# Patient Record
Sex: FEMALE | Race: WHITE | NOT HISPANIC OR LATINO | Employment: OTHER | URBAN - METROPOLITAN AREA
[De-identification: names, ages, dates, MRNs, and addresses within clinical notes are randomized per-mention and may not be internally consistent; named-entity substitution may affect disease eponyms.]

---

## 2023-03-15 ENCOUNTER — TELEPHONE (OUTPATIENT)
Dept: PRIMARY CARE | Facility: CLINIC | Age: 68
End: 2023-03-15
Payer: MEDICARE

## 2023-03-15 DIAGNOSIS — E11.9 DIABETES MELLITUS TYPE 2 IN NONOBESE (MULTI): Primary | ICD-10-CM

## 2023-03-15 DIAGNOSIS — E11.65 CONTROLLED TYPE 2 DIABETES MELLITUS WITH HYPERGLYCEMIA, WITH LONG-TERM CURRENT USE OF INSULIN (MULTI): Primary | ICD-10-CM

## 2023-03-15 DIAGNOSIS — Z79.4 CONTROLLED TYPE 2 DIABETES MELLITUS WITH HYPERGLYCEMIA, WITH LONG-TERM CURRENT USE OF INSULIN (MULTI): Primary | ICD-10-CM

## 2023-03-15 RX ORDER — DULAGLUTIDE 4.5 MG/.5ML
4.5 INJECTION, SOLUTION SUBCUTANEOUS
COMMUNITY
Start: 2021-10-14 | End: 2023-03-15 | Stop reason: SDUPTHER

## 2023-03-15 RX ORDER — DULAGLUTIDE 4.5 MG/.5ML
4.5 INJECTION, SOLUTION SUBCUTANEOUS
Qty: 4 PEN | Refills: 10 | Status: SHIPPED | OUTPATIENT
Start: 2023-03-15 | End: 2023-09-05 | Stop reason: SDUPTHER

## 2023-03-15 RX ORDER — DAPAGLIFLOZIN 10 MG/1
10 TABLET, FILM COATED ORAL
Qty: 90 TABLET | Refills: 0 | Status: SHIPPED | OUTPATIENT
Start: 2023-03-15 | End: 2023-06-21

## 2023-03-15 RX ORDER — DAPAGLIFLOZIN 10 MG/1
10 TABLET, FILM COATED ORAL
COMMUNITY
End: 2023-03-15 | Stop reason: SDUPTHER

## 2023-03-15 NOTE — TELEPHONE ENCOUNTER
Pt returned Dawns call she found Trulicity 4.5 at [facility] and asked if you could send the rx there.

## 2023-05-02 ENCOUNTER — TELEPHONE (OUTPATIENT)
Dept: PRIMARY CARE | Facility: CLINIC | Age: 68
End: 2023-05-02
Payer: MEDICARE

## 2023-05-02 NOTE — TELEPHONE ENCOUNTER
I called the pt to remind her of her upcoming 5/9/23 Medicare wellness appoint and to request she bring her living will and DPOAH to have scanned into her chart. Reached her vm. MELISAOM.

## 2023-05-09 ENCOUNTER — OFFICE VISIT (OUTPATIENT)
Dept: PRIMARY CARE | Facility: CLINIC | Age: 68
End: 2023-05-09
Payer: MEDICARE

## 2023-05-09 ENCOUNTER — TELEPHONE (OUTPATIENT)
Dept: PRIMARY CARE | Facility: CLINIC | Age: 68
End: 2023-05-09

## 2023-05-09 VITALS
RESPIRATION RATE: 18 BRPM | HEIGHT: 66 IN | BODY MASS INDEX: 27.32 KG/M2 | WEIGHT: 170 LBS | DIASTOLIC BLOOD PRESSURE: 70 MMHG | HEART RATE: 64 BPM | SYSTOLIC BLOOD PRESSURE: 110 MMHG

## 2023-05-09 DIAGNOSIS — Z00.00 ENCOUNTER FOR SUBSEQUENT ANNUAL WELLNESS VISIT (AWV) IN MEDICARE PATIENT: ICD-10-CM

## 2023-05-09 DIAGNOSIS — N95.2 ATROPHIC VAGINITIS: ICD-10-CM

## 2023-05-09 DIAGNOSIS — E78.5 HYPERLIPIDEMIA DUE TO TYPE 2 DIABETES MELLITUS (MULTI): ICD-10-CM

## 2023-05-09 DIAGNOSIS — Z79.2 PROPHYLACTIC ANTIBIOTIC: ICD-10-CM

## 2023-05-09 DIAGNOSIS — Z00.01 ANNUAL VISIT FOR GENERAL ADULT MEDICAL EXAMINATION WITH ABNORMAL FINDINGS: ICD-10-CM

## 2023-05-09 DIAGNOSIS — M25.512 CHRONIC LEFT SHOULDER PAIN: ICD-10-CM

## 2023-05-09 DIAGNOSIS — N95.2 POSTMENOPAUSAL ATROPHIC VAGINITIS: ICD-10-CM

## 2023-05-09 DIAGNOSIS — E11.9 TYPE 2 DIABETES MELLITUS TREATED WITH INSULIN (MULTI): Primary | ICD-10-CM

## 2023-05-09 DIAGNOSIS — N85.2 ENLARGED UTERUS: ICD-10-CM

## 2023-05-09 DIAGNOSIS — Z87.42 HISTORY OF ABNORMAL CERVICAL PAP SMEAR: ICD-10-CM

## 2023-05-09 DIAGNOSIS — G89.29 CHRONIC LEFT SHOULDER PAIN: ICD-10-CM

## 2023-05-09 DIAGNOSIS — E11.69 HYPERLIPIDEMIA DUE TO TYPE 2 DIABETES MELLITUS (MULTI): ICD-10-CM

## 2023-05-09 DIAGNOSIS — Z12.4 PAP SMEAR FOR CERVICAL CANCER SCREENING: ICD-10-CM

## 2023-05-09 DIAGNOSIS — I10 HYPERTENSION, UNSPECIFIED TYPE: ICD-10-CM

## 2023-05-09 DIAGNOSIS — E04.9 GOITER: ICD-10-CM

## 2023-05-09 DIAGNOSIS — Z79.4 TYPE 2 DIABETES MELLITUS TREATED WITH INSULIN (MULTI): Primary | ICD-10-CM

## 2023-05-09 DIAGNOSIS — D25.0 SUBMUCOUS LEIOMYOMA OF UTERUS: ICD-10-CM

## 2023-05-09 DIAGNOSIS — Z96.653 HISTORY OF BILATERAL KNEE REPLACEMENT: ICD-10-CM

## 2023-05-09 DIAGNOSIS — Z78.9 FULL CODE STATUS: ICD-10-CM

## 2023-05-09 DIAGNOSIS — E11.9 ENCOUNTER FOR DIABETIC FOOT EXAM (MULTI): ICD-10-CM

## 2023-05-09 DIAGNOSIS — Z13.89 ENCOUNTER FOR SCREENING FOR OTHER DISORDER: ICD-10-CM

## 2023-05-09 PROBLEM — H02.59 LAXITY OF EYELID: Status: RESOLVED | Noted: 2023-05-09 | Resolved: 2023-05-09

## 2023-05-09 PROBLEM — H02.535 EYELID RETRACTION LEFT LOWER EYELID: Status: RESOLVED | Noted: 2023-05-09 | Resolved: 2023-05-09

## 2023-05-09 PROBLEM — J30.9 ALLERGIC RHINITIS: Status: ACTIVE | Noted: 2023-05-09

## 2023-05-09 PROBLEM — E04.1 SOLITARY THYROID NODULE: Status: ACTIVE | Noted: 2023-05-09

## 2023-05-09 PROBLEM — E55.9 VITAMIN D DEFICIENCY: Status: ACTIVE | Noted: 2023-05-09

## 2023-05-09 PROBLEM — H26.9 CATARACT: Status: ACTIVE | Noted: 2023-05-09

## 2023-05-09 PROBLEM — H05.20 PROPTOSIS: Status: ACTIVE | Noted: 2023-05-09

## 2023-05-09 PROBLEM — R31.29 OTHER MICROSCOPIC HEMATURIA: Status: ACTIVE | Noted: 2023-05-09

## 2023-05-09 PROBLEM — R45.89 SELF-ESTEEM DISTURBANCE: Status: ACTIVE | Noted: 2023-05-09

## 2023-05-09 PROBLEM — D25.9 FIBROID UTERUS: Status: ACTIVE | Noted: 2023-05-09

## 2023-05-09 PROBLEM — T38.3X5A METFORMIN ADVERSE REACTION: Status: RESOLVED | Noted: 2023-05-09 | Resolved: 2023-05-09

## 2023-05-09 PROBLEM — R31.29 OTHER MICROSCOPIC HEMATURIA: Status: RESOLVED | Noted: 2023-05-09 | Resolved: 2023-05-09

## 2023-05-09 PROBLEM — M17.11 ARTHRITIS OF KNEE, RIGHT: Status: RESOLVED | Noted: 2023-05-09 | Resolved: 2023-05-09

## 2023-05-09 PROBLEM — H52.13 AXIAL MYOPIA OF BOTH EYES: Status: ACTIVE | Noted: 2023-05-09

## 2023-05-09 PROBLEM — R30.0 DYSURIA: Status: RESOLVED | Noted: 2023-05-09 | Resolved: 2023-05-09

## 2023-05-09 PROBLEM — N36.2 URETHRAL CARUNCLE: Status: ACTIVE | Noted: 2023-05-09

## 2023-05-09 PROBLEM — R63.4 ABNORMAL LOSS OF WEIGHT: Status: RESOLVED | Noted: 2023-05-09 | Resolved: 2023-05-09

## 2023-05-09 PROBLEM — F41.9 ANXIETY: Status: ACTIVE | Noted: 2023-05-09

## 2023-05-09 PROBLEM — K44.9 HIATAL HERNIA: Status: ACTIVE | Noted: 2023-05-09

## 2023-05-09 PROBLEM — F43.29 STRESS AND ADJUSTMENT REACTION: Status: RESOLVED | Noted: 2023-05-09 | Resolved: 2023-05-09

## 2023-05-09 PROBLEM — I49.49 ECTOPIC BEAT: Status: RESOLVED | Noted: 2023-05-09 | Resolved: 2023-05-09

## 2023-05-09 PROBLEM — I27.20 PULMONARY HYPERTENSION (MULTI): Status: RESOLVED | Noted: 2023-05-09 | Resolved: 2023-05-09

## 2023-05-09 PROBLEM — R31.9 HEMATURIA: Status: RESOLVED | Noted: 2023-05-09 | Resolved: 2023-05-09

## 2023-05-09 PROBLEM — E53.8 VITAMIN B12 DEFICIENCY (NON ANEMIC): Status: ACTIVE | Noted: 2023-05-09

## 2023-05-09 PROBLEM — E87.6 HYPOKALEMIA: Status: ACTIVE | Noted: 2023-05-09

## 2023-05-09 PROBLEM — N36.8 MASS OF URETHRA: Status: RESOLVED | Noted: 2023-05-09 | Resolved: 2023-05-09

## 2023-05-09 PROBLEM — Z96.659 S/P KNEE REPLACEMENT: Status: ACTIVE | Noted: 2017-06-29

## 2023-05-09 PROBLEM — H02.532 EYELID RETRACTION RIGHT LOWER EYELID: Status: RESOLVED | Noted: 2023-05-09 | Resolved: 2023-05-09

## 2023-05-09 PROBLEM — N36.2 URETHRAL CARUNCLE: Status: RESOLVED | Noted: 2023-05-09 | Resolved: 2023-05-09

## 2023-05-09 PROBLEM — F42.8 OBSESSIVE THINKING: Status: ACTIVE | Noted: 2023-05-09

## 2023-05-09 PROBLEM — R01.1 HEART MURMUR: Status: ACTIVE | Noted: 2023-05-09

## 2023-05-09 PROBLEM — R80.9 PROTEINURIA: Status: RESOLVED | Noted: 2023-05-09 | Resolved: 2023-05-09

## 2023-05-09 PROBLEM — I35.8 AORTIC VALVE SCLEROSIS: Status: ACTIVE | Noted: 2023-05-09

## 2023-05-09 LAB — POC HEMOGLOBIN A1C: 7.8 % (ref 4.2–6.5)

## 2023-05-09 PROCEDURE — 1159F MED LIST DOCD IN RCRD: CPT | Performed by: INTERNAL MEDICINE

## 2023-05-09 PROCEDURE — 3074F SYST BP LT 130 MM HG: CPT | Performed by: INTERNAL MEDICINE

## 2023-05-09 PROCEDURE — 3078F DIAST BP <80 MM HG: CPT | Performed by: INTERNAL MEDICINE

## 2023-05-09 PROCEDURE — 99497 ADVNCD CARE PLAN 30 MIN: CPT | Performed by: INTERNAL MEDICINE

## 2023-05-09 PROCEDURE — 1157F ADVNC CARE PLAN IN RCRD: CPT | Performed by: INTERNAL MEDICINE

## 2023-05-09 PROCEDURE — 1036F TOBACCO NON-USER: CPT | Performed by: INTERNAL MEDICINE

## 2023-05-09 PROCEDURE — 1170F FXNL STATUS ASSESSED: CPT | Performed by: INTERNAL MEDICINE

## 2023-05-09 PROCEDURE — 99397 PER PM REEVAL EST PAT 65+ YR: CPT | Performed by: INTERNAL MEDICINE

## 2023-05-09 PROCEDURE — 99213 OFFICE O/P EST LOW 20 MIN: CPT | Performed by: INTERNAL MEDICINE

## 2023-05-09 PROCEDURE — 83036 HEMOGLOBIN GLYCOSYLATED A1C: CPT | Performed by: INTERNAL MEDICINE

## 2023-05-09 PROCEDURE — 4010F ACE/ARB THERAPY RXD/TAKEN: CPT | Performed by: INTERNAL MEDICINE

## 2023-05-09 PROCEDURE — G0439 PPPS, SUBSEQ VISIT: HCPCS | Performed by: INTERNAL MEDICINE

## 2023-05-09 PROCEDURE — G0442 ANNUAL ALCOHOL SCREEN 15 MIN: HCPCS | Performed by: INTERNAL MEDICINE

## 2023-05-09 PROCEDURE — 3051F HG A1C>EQUAL 7.0%<8.0%: CPT | Performed by: INTERNAL MEDICINE

## 2023-05-09 PROCEDURE — 1160F RVW MEDS BY RX/DR IN RCRD: CPT | Performed by: INTERNAL MEDICINE

## 2023-05-09 RX ORDER — ESTRADIOL 0.1 MG/G
CREAM VAGINAL
Qty: 42.5 G | Refills: 2 | Status: SHIPPED | OUTPATIENT
Start: 2023-05-09 | End: 2024-04-13

## 2023-05-09 RX ORDER — RAMIPRIL 10 MG/1
10 CAPSULE ORAL EVERY 12 HOURS
Qty: 180 CAPSULE | Refills: 3 | Status: SHIPPED | OUTPATIENT
Start: 2023-05-09 | End: 2024-05-15 | Stop reason: SDUPTHER

## 2023-05-09 RX ORDER — INSULIN GLARGINE 100 [IU]/ML
18 INJECTION, SOLUTION SUBCUTANEOUS NIGHTLY
Qty: 10 ML | Refills: 2 | Status: SHIPPED | OUTPATIENT
Start: 2023-05-09 | End: 2023-09-05 | Stop reason: SDUPTHER

## 2023-05-09 RX ORDER — AMOXICILLIN 500 MG/1
2000 CAPSULE ORAL AS NEEDED
Qty: 8 CAPSULE | Refills: 1 | Status: SHIPPED | OUTPATIENT
Start: 2023-05-09 | End: 2023-05-09 | Stop reason: SDUPTHER

## 2023-05-09 RX ORDER — AMOXICILLIN 500 MG/1
2000 CAPSULE ORAL AS NEEDED
Qty: 8 CAPSULE | Refills: 1 | Status: SHIPPED | OUTPATIENT
Start: 2023-05-09 | End: 2024-05-15 | Stop reason: SDUPTHER

## 2023-05-09 RX ORDER — DICLOFENAC SODIUM 10 MG/G
GEL TOPICAL
Qty: 100 G | Refills: 3 | Status: SHIPPED | OUTPATIENT
Start: 2023-05-09 | End: 2023-09-05 | Stop reason: ALTCHOICE

## 2023-05-09 RX ORDER — AMOXICILLIN 500 MG/1
500 CAPSULE ORAL AS NEEDED
Qty: 8 CAPSULE | Refills: 1 | Status: SHIPPED | OUTPATIENT
Start: 2023-05-09 | End: 2023-05-09 | Stop reason: DRUGHIGH

## 2023-05-09 SDOH — ECONOMIC STABILITY: HOUSING INSECURITY
IN THE LAST 12 MONTHS, WAS THERE A TIME WHEN YOU DID NOT HAVE A STEADY PLACE TO SLEEP OR SLEPT IN A SHELTER (INCLUDING NOW)?: NO

## 2023-05-09 SDOH — ECONOMIC STABILITY: INCOME INSECURITY: IN THE LAST 12 MONTHS, WAS THERE A TIME WHEN YOU WERE NOT ABLE TO PAY THE MORTGAGE OR RENT ON TIME?: NO

## 2023-05-09 SDOH — ECONOMIC STABILITY: FOOD INSECURITY: WITHIN THE PAST 12 MONTHS, THE FOOD YOU BOUGHT JUST DIDN'T LAST AND YOU DIDN'T HAVE MONEY TO GET MORE.: NEVER TRUE

## 2023-05-09 SDOH — ECONOMIC STABILITY: FOOD INSECURITY: WITHIN THE PAST 12 MONTHS, YOU WORRIED THAT YOUR FOOD WOULD RUN OUT BEFORE YOU GOT MONEY TO BUY MORE.: NEVER TRUE

## 2023-05-09 SDOH — HEALTH STABILITY: PHYSICAL HEALTH: ON AVERAGE, HOW MANY DAYS PER WEEK DO YOU ENGAGE IN MODERATE TO STRENUOUS EXERCISE (LIKE A BRISK WALK)?: 3 DAYS

## 2023-05-09 SDOH — ECONOMIC STABILITY: TRANSPORTATION INSECURITY
IN THE PAST 12 MONTHS, HAS THE LACK OF TRANSPORTATION KEPT YOU FROM MEDICAL APPOINTMENTS OR FROM GETTING MEDICATIONS?: NO

## 2023-05-09 SDOH — ECONOMIC STABILITY: TRANSPORTATION INSECURITY
IN THE PAST 12 MONTHS, HAS LACK OF TRANSPORTATION KEPT YOU FROM MEETINGS, WORK, OR FROM GETTING THINGS NEEDED FOR DAILY LIVING?: NO

## 2023-05-09 SDOH — HEALTH STABILITY: PHYSICAL HEALTH: ON AVERAGE, HOW MANY MINUTES DO YOU ENGAGE IN EXERCISE AT THIS LEVEL?: 30 MIN

## 2023-05-09 SDOH — ECONOMIC STABILITY: HOUSING INSECURITY: IN THE LAST 12 MONTHS, HOW MANY PLACES HAVE YOU LIVED?: 1

## 2023-05-09 ASSESSMENT — LIFESTYLE VARIABLES
HOW OFTEN DO YOU HAVE A DRINK CONTAINING ALCOHOL: MONTHLY OR LESS
HOW OFTEN DO YOU HAVE SIX OR MORE DRINKS ON ONE OCCASION: NEVER
AUDIT-C TOTAL SCORE: 1
SKIP TO QUESTIONS 9-10: 1
HOW MANY STANDARD DRINKS CONTAINING ALCOHOL DO YOU HAVE ON A TYPICAL DAY: 1 OR 2

## 2023-05-09 ASSESSMENT — SOCIAL DETERMINANTS OF HEALTH (SDOH)
DO YOU BELONG TO ANY CLUBS OR ORGANIZATIONS SUCH AS CHURCH GROUPS UNIONS, FRATERNAL OR ATHLETIC GROUPS, OR SCHOOL GROUPS?: NO
HOW OFTEN DO YOU ATTENT MEETINGS OF THE CLUB OR ORGANIZATION YOU BELONG TO?: NEVER
WITHIN THE LAST YEAR, HAVE YOU BEEN AFRAID OF YOUR PARTNER OR EX-PARTNER?: NO
WITHIN THE LAST YEAR, HAVE YOU BEEN HUMILIATED OR EMOTIONALLY ABUSED IN OTHER WAYS BY YOUR PARTNER OR EX-PARTNER?: NO
HOW OFTEN DO YOU GET TOGETHER WITH FRIENDS OR RELATIVES?: TWICE A WEEK
WITHIN THE LAST YEAR, HAVE YOU BEEN KICKED, HIT, SLAPPED, OR OTHERWISE PHYSICALLY HURT BY YOUR PARTNER OR EX-PARTNER?: NO
IN A TYPICAL WEEK, HOW MANY TIMES DO YOU TALK ON THE PHONE WITH FAMILY, FRIENDS, OR NEIGHBORS?: TWICE A WEEK
WITHIN THE LAST YEAR, HAVE TO BEEN RAPED OR FORCED TO HAVE ANY KIND OF SEXUAL ACTIVITY BY YOUR PARTNER OR EX-PARTNER?: NO
HOW OFTEN DO YOU ATTEND CHURCH OR RELIGIOUS SERVICES?: MORE THAN 4 TIMES PER YEAR
HOW HARD IS IT FOR YOU TO PAY FOR THE VERY BASICS LIKE FOOD, HOUSING, MEDICAL CARE, AND HEATING?: NOT HARD AT ALL

## 2023-05-09 ASSESSMENT — PAIN SCALES - GENERAL: PAINLEVEL: 0-NO PAIN

## 2023-05-09 ASSESSMENT — ACTIVITIES OF DAILY LIVING (ADL)
PATIENT'S MEMORY ADEQUATE TO SAFELY COMPLETE DAILY ACTIVITIES?: YES
DRESSING YOURSELF: INDEPENDENT
JUDGMENT_ADEQUATE_SAFELY_COMPLETE_DAILY_ACTIVITIES: YES
HEARING - RIGHT EAR: FUNCTIONAL
TAKING_MEDICATION: INDEPENDENT
DOING_HOUSEWORK: INDEPENDENT
BATHING: INDEPENDENT
MANAGING_FINANCES: INDEPENDENT
GROCERY_SHOPPING: INDEPENDENT
GROOMING: INDEPENDENT
FEEDING YOURSELF: INDEPENDENT
WALKS IN HOME: INDEPENDENT
TOILETING: INDEPENDENT
ADEQUATE_TO_COMPLETE_ADL: YES
HEARING - LEFT EAR: FUNCTIONAL

## 2023-05-09 ASSESSMENT — COLUMBIA-SUICIDE SEVERITY RATING SCALE - C-SSRS
6. HAVE YOU EVER DONE ANYTHING, STARTED TO DO ANYTHING, OR PREPARED TO DO ANYTHING TO END YOUR LIFE?: NO
1. IN THE PAST MONTH, HAVE YOU WISHED YOU WERE DEAD OR WISHED YOU COULD GO TO SLEEP AND NOT WAKE UP?: NO
2. HAVE YOU ACTUALLY HAD ANY THOUGHTS OF KILLING YOURSELF?: NO

## 2023-05-09 ASSESSMENT — ANXIETY QUESTIONNAIRES
6. BECOMING EASILY ANNOYED OR IRRITABLE: NOT AT ALL
7. FEELING AFRAID AS IF SOMETHING AWFUL MIGHT HAPPEN: NOT AT ALL
2. NOT BEING ABLE TO STOP OR CONTROL WORRYING: NOT AT ALL
5. BEING SO RESTLESS THAT IT IS HARD TO SIT STILL: NOT AT ALL
3. WORRYING TOO MUCH ABOUT DIFFERENT THINGS: NOT AT ALL
GAD7 TOTAL SCORE: 0
1. FEELING NERVOUS, ANXIOUS, OR ON EDGE: NOT AT ALL
4. TROUBLE RELAXING: NOT AT ALL
IF YOU CHECKED OFF ANY PROBLEMS ON THIS QUESTIONNAIRE, HOW DIFFICULT HAVE THESE PROBLEMS MADE IT FOR YOU TO DO YOUR WORK, TAKE CARE OF THINGS AT HOME, OR GET ALONG WITH OTHER PEOPLE: NOT DIFFICULT AT ALL

## 2023-05-09 ASSESSMENT — PATIENT HEALTH QUESTIONNAIRE - PHQ9
2. FEELING DOWN, DEPRESSED OR HOPELESS: NOT AT ALL
1. LITTLE INTEREST OR PLEASURE IN DOING THINGS: NOT AT ALL
SUM OF ALL RESPONSES TO PHQ9 QUESTIONS 1 AND 2: 0

## 2023-05-09 ASSESSMENT — ENCOUNTER SYMPTOMS
OCCASIONAL FEELINGS OF UNSTEADINESS: 0
DEPRESSION: 0
LOSS OF SENSATION IN FEET: 0

## 2023-05-09 NOTE — PATIENT INSTRUCTIONS
Thank you for coming in for your annual and wellness exam.  Increase lantus to 18 units at night. Keep other medications the same.  Watch your diet to get that a1c and sugars down, they are improving over this past week.    Follow up 3-4 months on hypertension and diabetes, a1c at that visit.    Colonoscopy may be due in 8350-0344, as you had 2 tiny polyps on the 2020 scope, but we can discuss then.  A Pap smear was obtained today, will let you know results. Call if you have not heard in 3 weeks.    Staying hydrated is important for many bodily functions. Generally consuming 48-64 ounces of water per day is recommended.  Exercise 30 minutes daily: goal of at least 150 minutes per week is important to your health.

## 2023-05-09 NOTE — PROGRESS NOTES
Subjective   Patient ID: Alexia Cho is a 68 y.o. female who presents for Medicare Annual Wellness Visit Subsequent  and annual pe.(Pt here for subsequent annual Medicare Wellness Visit. Pt denies any new problems or concerns at this time. ).  LAST VISIT PLAN 1/31/23  Patient Discussion/Summary    MEDICATIONS:  same  INSTRUCTIONS:  You are encouraged to drink 64 oz of water per day. 1 or 2 cups of herbal tea could be counted toward the water intake.    A diet that is low in sugars, refined grains and processed foods is recommended.  Exercise at least 30 minutes per day is also recommended.at least walk around for 15 minutes in the evening before getting on couch under blanket.    FOLLOW UP WITH DR. MULTANI:  Next visit:wellness visit april or may  Provider Impressions    DM2 with inc glu an dhld. no change in med. discussed diet exercise, follow up-watch weight which has creeped up.    Pulmonary HTN noted on a test, asymp    Depression with anxiety followed by psychiatry and doing ok on meds, has follow up  goiter stable exam  Vitamin B12, D deficiencies and hx hypokalemia, no meds changes follow labs, asymp  lab results reviewed.  END INFO FROM PRIOR VISIT  HPI  Health maintenance items last completed:  Colonoscopy: 2/2020- polyps 2 sessile hyperplastic in the rectum. She thought told 10 years? Seems it should be 5-7 years.  Mammogram: 11/17/22- cat 1 neg  Bone Density: 4/2021- neg  Urine albumin if HTN or DM2: normal 2023 and last year ua normla other than approp glucose  Pap: 1/2019- neg  Pelvic: saw urogyn in sept<2020 for caruncle and atrophic and topical estrogen recommended -was to followup in 2 months but di dnot, no c/o. No bleeding. She is using vag estrogen twice per week with applicator.  Breast exam in office: today.  Vaccines due or not completed: Shringrix  Last Health Maintenance exam: 4/7/22     Scribe Transcription:  She has a hx of bilateral TKR. She still has a cataract.    She reports using  her vaginal estrogen twice weekly.     Diabetes:Type II:Is taking medications regularly, denies side effects.  Denies hypoglycemia, polyuria, polydipsia.  No new numbness or paresthesias of extremities.No syncope or dizziness.No blurred vision.  Lab Results   Component Value Date    HGBA1C 7.8 (A) 05/09/2023    HGBA1C 7.3 (A) 01/20/2023    HGBA1C 6.9 (A) 10/04/2022     Lab Results   Component Value Date    CREATININE 0.65 01/20/2023         Lab Results   Component Value Date    GLUCOSE 147 (H) 01/20/2023    CALCIUM 9.4 01/20/2023     01/20/2023    K 4.4 01/20/2023    CO2 31 01/20/2023     01/20/2023    BUN 18 01/20/2023    CREATININE 0.65 01/20/2023      No results found for this or any previous visit (from the past 4464 hour(s)).      Freestyle Darren data:  Current glucose: 112  Avg glucose: 7 days 144, 30 days 158, 90 days 160.  Time in target: 7 days 56%, 30 days 33%, 90 days 32%  Below target: none  Above target: 7 days 44%, 30 days 67%, 90 days 68%  Set range:   Meds: Farxiga, trulicity, lantus  GLP-1 agonists (Trulicity, Victoza, Ozempic, Wegovy, Rybelsus, Bydureon): No nausea, vomiting, abdominal pain and anterior neck pain/swelling.    SGLT-2 inhibitors (Farxiga, Jardiance, Invokana): No hematuria, dysuria, or yeast infections.       Review of Systems  All systems reviewed and are negative unless otherwise stated in HPI.   Review of systems, written document, scanned in to office visit.  I reviewed 7 page history and review of systems form.  Negative review of systen\ms   using cpapanxiety is good.  Objective   Lab Results   Component Value Date    WBC 9.1 01/20/2023    HGB 15.1 01/20/2023    HCT 46.0 01/20/2023     01/20/2023    CHOL 150 01/20/2023    TRIG 150 (H) 01/20/2023    HDL 38.4 (A) 01/20/2023    ALT 26 01/20/2023    AST 16 01/20/2023     01/20/2023    K 4.4 01/20/2023     01/20/2023    CREATININE 0.65 01/20/2023    BUN 18 01/20/2023    CO2 31 01/20/2023    TSH  "1.52 01/20/2023    HGBA1C 7.3 (A) 01/20/2023       Physical ExamBP 110/70 (BP Location: Left arm, Patient Position: Sitting, BP Cuff Size: Adult)   Pulse 64   Resp 18   Ht 1.676 m (5' 6\")   Wt 77.1 kg (170 lb)   BMI 27.44 kg/m²   Patient looks well and is in no obvious distress.  HEENT:   Normocephalic, no facial asymmetry  Eyes: Sclera and conjunctiva are clear.  Neck: No adenopathy cervical (Ant/post/lat), no Supraclavicular nodes.   Thyroid normal.   Carotid pulses normal, no carotid bruits.  Lungs : RR normal. Clear to auscultation anterior, posterior and lateral. No rales, wheezes rhonchi or rubs. Good air exchange.  Heart: RRR. No Murmur, gallop, click or rub.  Abdomen: Bowel sounds normal, No bruits. No pulsatile mass. No hepatosplenomegaly, masses or tenderness. Soft, no guarding.  Vascular:  Posterior tibialis and dorsalis pedis pulses within normal limits bilaterally.   Extremities: no upper extremity edema. No lower extremity edema.   Musculoskeletal: no synovitis of joints seen. No new deformity.  Neuro: CN 2-12 intact. Alert, appropriate.   Ambulates independently.  No gross motor deficit.   No tremors.  Psych: normal mood and affect.  Skin: No rash, bruising petechiae or jaundice.    Breast Exam : Symmetric appearance. No masses, nipple discharge, skin retraction, axillary or supraclavicular adenopathy.  Gyn:  External Genitalia without lesions. No lichen sclerosis Urethra minor mass less than prior and no caruncle. Speculum exam: no lesions or vaginal discharge, cervix without lesions. Pap done Bimanual exam: bulky enlarged uterus unchanged. Cervix does move easily. Canont palpate adnexae-the ovaries were not seenon 2021 ultrasound either. This is a stable exam. No tenderness or mass. Just big uterus, known fibroids.No anal/perineal lesions. Recto vaginal exam normal. (Pt declined chaperone)        Assessment/Plan   Problem List Items Addressed This Visit       Enlarged uterus    Fibroid uterus  " decline ultrasound for surveillance, exam stable and asymptomatic.    Goiter    Hyperlipidemia due to type 2 diabetes mellitus (CMS/[facility])    Hypertension    Postmenopausal atrophic vaginitis   Hx abnl pap remote, pap done today with hpv.  Other Visit Diagnoses       Type 2 diabetes mellitus treated with insulin (CMS/[facility])    -  Primary    Relevant Medications    ramipril (Altace) 10 mg capsule    Lantus U-100 Insulin 100 unit/mL injection  Continue farxiga and trulicity, no c/o with these    Other Relevant Orders    Referral to Ophthalmology over due for eye exam    Chronic left shoulder pain        Relevant Medications    diclofenac sodium (Voltaren) 1 % gel refill    Atrophic vaginitis        Relevant Medications    estradiol (Estrace) 0.01 % (0.1 mg/gram) vaginal cream-this is working well, said ist helps the dryness, no bleeding, or issues. Urethral caruncle is nearly gone.    History of bilateral knee replacement        Relevant Medications    amoxicillin (Amoxil) 500 mg capsule    Prophylactic antibiotic        Relevant Medications    amoxicillin (Amoxil) 500 mg capsule    ramipril (Altace) 10 mg capsule    Encounter for subsequent annual wellness visit (AWV) in Medicare patient        Encounter for screening for other disorder        Full code status            She prefers no pelvic ultrasound and is not having any gyn issues.  Annual  history and physical completed including  ROS, PE.   Medicare wellness visit  completed including:  Immunizations,   Health Maintenance items,  Discussion of advanced directives and code status, which is: full code  Fall risk and prevention addressed.  Functionality and pain were assessed.   Cancer screening testing was addressed as appropriate-see patient discussion/orders.   Medications were discussed and reviewed/reconciled and refill need assessed/handled.   Patient states taking their medication regularly and appropriately.  Also:   Depression screening PhQ-2 completed: neg,  stable follows with psychiatry.  Alcohol misuse screen: neg.    In addition to the wellness visit and screening, the above medical issues were addressed:  As well as results of testing completed since last visit.

## 2023-05-09 NOTE — TELEPHONE ENCOUNTER
Pharmacy calling in regards to amoxicillin script. Has to do with how it is dosed. Can you call them back.

## 2023-05-17 ENCOUNTER — TELEPHONE (OUTPATIENT)
Dept: PRIMARY CARE | Facility: CLINIC | Age: 68
End: 2023-05-17
Payer: MEDICARE

## 2023-05-17 NOTE — TELEPHONE ENCOUNTER
----- Message from Nat Wasserman MD sent at 5/16/2023  8:52 PM EDT -----  Please call the patient regarding her result. Pap test normal and hpv test negative, good news.    Pt notified. LMOM

## 2023-05-17 NOTE — TELEPHONE ENCOUNTER
----- Message from Nat Wasserman MD sent at 5/16/2023  8:52 PM EDT -----  Please call the patient regarding her result. Pap test normal and hpv test negative, good news.

## 2023-06-17 DIAGNOSIS — E11.9 DIABETES MELLITUS TYPE 2 IN NONOBESE (MULTI): ICD-10-CM

## 2023-06-21 RX ORDER — DAPAGLIFLOZIN 10 MG/1
TABLET, FILM COATED ORAL
Qty: 90 TABLET | Refills: 3 | Status: SHIPPED | OUTPATIENT
Start: 2023-06-21 | End: 2024-03-18 | Stop reason: SDUPTHER

## 2023-09-04 ASSESSMENT — ENCOUNTER SYMPTOMS
FATIGUE: 0
SWEATS: 0
POLYPHAGIA: 0
CONFUSION: 0
SPEECH DIFFICULTY: 0
BLURRED VISION: 0
NERVOUS/ANXIOUS: 0
HEADACHES: 0
TREMORS: 0
WEAKNESS: 0
WEIGHT LOSS: 0
DIZZINESS: 0
VISUAL CHANGE: 0
SEIZURES: 0
BLACKOUTS: 0
POLYDIPSIA: 0
HUNGER: 0

## 2023-09-04 NOTE — PROGRESS NOTES
Subjective   Patient ID: Mary Cho is a 68 y.o. female who presents for Follow-up (Pt here for follow up visit. Pt denies any new problems or concerns at this time. A1C done).  LAST VISIT PLAN 5/09/2023  PATIENT'S DISCUSSION/SUMMARY:  Thank you for coming in for your annual and wellness exam.  Increase lantus to 18 units at night. Keep other medications the same.  Watch your diet to get that a1c and sugars down, they are improving over this past week.  Follow up 3-4 months on hypertension and diabetes, a1c at that visit.  Colonoscopy may be due in 3074-6237, as you had 2 tiny polyps on the 2020 scope, but we can discuss then.  A Pap smear was obtained today, will let you know results. Call if you have not heard in 3 weeks.  Staying hydrated is important for many bodily functions. Generally consuming 48-64 ounces of water per day is recommended.  Exercise 30 minutes daily: goal of at least 150 minutes per week is important to your health.   PROVIDER'S IMPRESSIONS:  Type 2 diabetes mellitus treated with insulin (CMS/[facility])  Chronic left shoulder pain  Atrophic vaginitis  History of bilateral knee replacement  Prophylactic antibiotic  Hypertension, unspecified type  Hyperlipidemia due to type 2 diabetes mellitus (CMS/[facility])  Encounter for subsequent annual wellness visit (AWV) in Medicare patient  Encounter for screening for other disorder  Full code status  Enlarged uterus  Submucous leiomyoma of uterus  Postmenopausal atrophic vaginitis  Goiter  History of abnormal cervical Pap smear  Pap smear for cervical cancer screening  Annual visit for general adult medical examination with abnormal findings  Encounter for diabetic foot exam (CMS/[facility])  Orders Placed  POCT glycosylated hemoglobin (Hb A1C) manually resulted  Referral to Ophthalmology Authorized  Medication Changes  amoxicillin 2,000 mg oral as needed   diclofenac sodium 1 % APPLY 2 GRAMS TO LEFT SHOULDER THREE TIMES EACH DAY as needed  insulin  glargine,hum.rec.anlog 18 Units subcutaneous Nightly, Take as directed per insulin instructions.    ramipril 10 mg oral Every 12 hours  END INFO FROM PRIOR VISIT    Hpi:Pt with dm2 htn severe anxiety (managed by psychiatry), hld, here for follow up.  A1c is 8.1% today.  Diabetes  She has type 2 diabetes mellitus. No MedicAlert identification noted. The initial diagnosis of diabetes was made 14 years ago. There are no hypoglycemic associated symptoms. Pertinent negatives for hypoglycemia include no confusion, dizziness, headaches, hunger, mood changes, nervousness/anxiousness, pallor, seizures, sleepiness, speech difficulty, sweats or tremors. There are no diabetic associated symptoms. Pertinent negatives for diabetes include no blurred vision, no chest pain, no fatigue, no foot paresthesias, no foot ulcerations, no polydipsia, no polyphagia, no polyuria, no visual change, no weakness and no weight loss. There are no hypoglycemic complications. Pertinent negatives for hypoglycemia complications include no blackouts, no hospitalization, no nocturnal hypoglycemia, no required assistance and no required glucagon injection. Symptoms are stable. Pertinent negatives for diabetic complications include no CVA, heart disease, impotence, nephropathy, peripheral neuropathy, PVD or retinopathy. Risk factors for coronary artery disease include dyslipidemia, family history, hypertension, obesity and post-menopausal. Current diabetic treatment includes insulin injections and oral agent (dual therapy). She is compliant with treatment most of the time. She is currently taking insulin at bedtime. Insulin injections are given by patient. Rotation sites for injection include the abdominal wall. Her weight is stable. She is following a generally healthy diet. Meal planning includes avoidance of concentrated sweets. She has had a previous visit with a dietitian. She participates in exercise three times a week. She monitors blood glucose  at home 1-2 x per day. She monitors urine at home <1 x per month. Blood glucose monitoring compliance is fair. There is no change in her home blood glucose trend. Her breakfast blood glucose is taken between 5-6 am. Her breakfast blood glucose range is generally 130-140 mg/dl. Her lunch blood glucose is taken between 12-1 pm. Her lunch blood glucose range is generally 140-180 mg/dl. Her dinner blood glucose is taken between 6-7 pm. Her dinner blood glucose range is generally 140-180 mg/dl. Her overall blood glucose range is 140-180 mg/dl. She does not see a podiatrist.Eye exam is not current.   Reminded to see eye doctor.  Cardiac: No CP , palpitations, increased luna  Resp: No sob, wheezing, cough  Extremities: No leg or ankle swelling, no claudication  Neuro: No dizziness, syncope, blurred vision. No new headaches.  Also see patient answers in ros and hpi    Diabetes:Type II:Is taking medications regularly, denies side effects.  Denies hypoglycemia, polyuria, polydipsia.  No new numbness or paresthesias of extremities.No syncope or dizziness.No blurred vision.  Also see patient answers in ros and hpi above.    A1c is 8.1 today. She thinks it is higher due to eating ice cream.  She has an optometry appt. Could not get in with ophthalmologist until January. She has had 3 and the last one retired.  Sees someone in [facility]. Never has had retinopathy. Has appt Friday.  Lab Results   Component Value Date    HGBA1C 7.8 (A) 05/09/2023    HGBA1C 7.3 (A) 01/20/2023    HGBA1C 6.9 (A) 10/04/2022     Lab Results   Component Value Date    CREATININE 0.65 01/20/2023         Lab Results   Component Value Date    GLUCOSE 147 (H) 01/20/2023    CALCIUM 9.4 01/20/2023     01/20/2023    K 4.4 01/20/2023    CO2 31 01/20/2023     01/20/2023    BUN 18 01/20/2023    CREATININE 0.65 01/20/2023           Review of Systems   Constitutional:  Negative for fatigue and weight loss.   Eyes:  Negative for blurred vision.  "  Cardiovascular:  Negative for chest pain.   Endocrine: Negative for polydipsia, polyphagia and polyuria.   Genitourinary:  Negative for impotence.   Skin:  Negative for pallor.   Neurological:  Negative for dizziness, tremors, seizures, speech difficulty, weakness and headaches.   Psychiatric/Behavioral:  Negative for confusion. The patient is not nervous/anxious.    Psychiatry note states she is doing well. She agrees.  GLP-1 agonists (Trulicity,No nausea, vomiting, abdominal pain and anterior neck pain/swelling.   Current Outpatient Medications   Medication Instructions    amLODIPine (Norvasc) 10 mg tablet TAKE 1 TABLET DAILY    amoxicillin (AMOXIL) 2,000 mg, oral, As needed    atorvastatin (Lipitor) 20 mg tablet 1 tablet, oral, Nightly    BD Insulin Syringe Ultra-Fine 0.3 mL 31 gauge x 5/16\" syringe use as directed once daily    cholecalciferol (Vitamin D-3) 50 mcg (2,000 unit) capsule oral    cyanocobalamin (Vitamin B-12) 1,000 mcg tablet 1 tablet, oral, Daily    DULoxetine (Cymbalta) 60 mg DR capsule oral    estradiol (Estrace) 0.01 % (0.1 mg/gram) vaginal cream INSERT 1/4TH GRAM IN VAGINA DAILY OR AS DIRECTED AND CAN RUB SMALL AMOUNT OF CREAM EXTERNALLY    Farxiga 10 mg take 1 tablet by mouth every morning BEFORE A MEAL    FreeStyle Lancets 28 gauge use 1 LANCET to TEST BLOOD SUGAR twice a day    FreeStyle Darren 14 Day Sensor kit APPLY DEVICE TO POSTERIOR ARM AND LEAVE FOR 14 DAYS. USE REMOTE TO SCAN AND REVIEW SUGARS    FreeStyle Lite Strips strip use 1 TEST STRIP to TEST BLOOD SUGAR once daily    hydroCHLOROthiazide (HYDRODiuril) 25 mg tablet TAKE ONE-HALF (1/2) TABLET DAILY (NEW DOSE)    Lactobacillus rhamnosus GG (CULTURELLE) 15 billion cell capsule, sprinkle capsule oral    Lantus U-100 Insulin 18 Units, subcutaneous, Nightly, Take as directed per insulin instructions.    metoprolol succinate XL (Toprol-XL) 200 mg 24 hr tablet 1 tablet, oral, Daily    mirtazapine (Remeron) 30 mg tablet 1 tablet, oral, " "Nightly    psyllium (Metamucil, sugar,) powder oral, Daily RT    ramipril (ALTACE) 10 mg, oral, Every 12 hours    spironolactone (Aldactone) 25 mg tablet 1 tablet, oral, Daily    Trulicity 4.5 mg, subcutaneous, Weekly     Objective   Lab Results   Component Value Date    WBC 9.1 01/20/2023    HGB 15.1 01/20/2023    HCT 46.0 01/20/2023     01/20/2023    CHOL 150 01/20/2023    TRIG 150 (H) 01/20/2023    HDL 38.4 (A) 01/20/2023    ALT 26 01/20/2023    AST 16 01/20/2023     01/20/2023    K 4.4 01/20/2023     01/20/2023    CREATININE 0.65 01/20/2023    BUN 18 01/20/2023    CO2 31 01/20/2023    TSH 1.52 01/20/2023    HGBA1C 7.8 (A) 05/09/2023     Pap may 2023 and hpv were negative.    Physical ExamBP 98/54 (BP Location: Left arm, Patient Position: Sitting, BP Cuff Size: Adult)   Pulse 72   Resp 18   Ht 1.689 m (5' 6.5\")   Wt 77.1 kg (170 lb)   BMI 27.03 kg/m²       6/27/2022     2:52 PM 10/5/2022     8:44 AM 10/5/2022     9:17 AM 1/31/2023     9:43 AM 5/9/2023    11:27 AM 5/9/2023    12:41 PM 9/5/2023     9:08 AM   Vitals   Systolic 122 102 100 106 102 110 98   Diastolic 68 58 60 62 54 70 54   Heart Rate 82 68  64 64  72   Resp 22 18  18 18  18   Height (in) 1.702 m (5' 7\")    1.676 m (5' 6\")  1.689 m (5' 6.5\")   Weight (lb) 157.4 166  170 170  170   BMI 24.65 kg/m2 26 kg/m2  26.63 kg/m2 27.44 kg/m2  27.03 kg/m2   BSA (m2) 1.84 m2 1.89 m2  1.91 m2 1.89 m2  1.9 m2   Visit Report     Report Report Report   She has not checked bp at home, has not been dizzy.    Patient looks her usual self, well appearing and is in no obvious distress.  Bp is running lower trhan needed although she is asymptomatic. Will decrease metoprolol as prefer she not be on as much beta blockade   HEENT:   Normocephalic, no facial asymmetry  Eyes: Sclera and conjunctiva are clear.  Neck: No adenopathy cervical (Ant/post/lat), no Supraclavicular nodes.   Thyroid hisotry of moderate goiter, but normal size on exam today and no " "nodules.  Carotid pulses normal, no carotid bruits.  Lungs : RR normal. Clear to auscultation anterior, posterior and lateral. No rales, wheezes rhonchi or rubs. Good air exchange.  Heart: RRR. No  gallop, click or rub. Cannot hear her usual 1-2/6 systolic murmur today upright or supine.  Abdomen: Bowel sounds normal, No bruits. No pulsatile mass. No hepatosplenomegaly, masses or tenderness. Soft, no guarding.  Vascular:  Posterior tibialis pulses within normal limits bilaterally.   Extremities: no upper extremity edema. No lower extremity edema.   Musculoskeletal: no synovitis of joints seen. No new deformity.  Neuro: CN 2-12 intact. Alert, appropriate.  Ambulates independently.  No gross motor deficit.   No tremors.  Psych: normal mood and affect.  Skin: No rash, bruising petechiae or jaundice.    Alexia was seen today for follow-up.  Diagnoses and all orders for this visit:  Need for pneumococcal vaccination (Primary)  -     Discontinue: pneumoc 20-jan conj-dip cr,PF, (Prevnar) 0.5 mL vaccine; Inject 0.5 mL into the shoulder, thigh, or buttocks 1 time for 1 dose.  -     pneumococcal conjugate (Prevnar 13) 0.5 mL vaccine; Inject 0.5 mL into the shoulder, thigh, or buttocks 1 time for 1 dose.  Type 2 diabetes mellitus treated with insulin (CMS/[facility])  -     Lantus U-100 Insulin 100 unit/mL injection; Inject 22 Units under the skin once daily at bedtime. Take as directed per insulin instructions.  -     BD Insulin Syringe Ultra-Fine 0.3 mL 31 gauge x 5/16\" syringe; use as directed once daily  -     POCT glycosylated hemoglobin (Hb A1C) manually resulted  -     Discontinue: pneumoc 20-jan conj-dip cr,PF, (Prevnar) 0.5 mL vaccine; Inject 0.5 mL into the shoulder, thigh, or buttocks 1 time for 1 dose.  -     pneumococcal conjugate (Prevnar 13) 0.5 mL vaccine; Inject 0.5 mL into the shoulder, thigh, or buttocks 1 time for 1 dose.  Hypertension, unspecified type  -     spironolactone (Aldactone) 25 mg tablet; Take 1 " tablet (25 mg) by mouth once daily.  -     Basic metabolic panel; Future  -     metoprolol succinate XL (Toprol XL) 100 mg 24 hr tablet; Take 1 tablet (100 mg) by mouth once daily. Take with a 50 mg tab for total of 150 mg daily.Do not crush or chew.  -     metoprolol succinate XL (Toprol XL) 50 mg 24 hr tablet; Take 1 tablet (50 mg) by mouth once daily. Take with a 100 mg tab for total of 150 mg daily. Do not crush or chew.  Controlled type 2 diabetes mellitus with hyperglycemia, with long-term current use of insulin (CMS/[facility])  -     Trulicity 4.5 mg/0.5 mL pen injector; Inject 4.5 mg under the skin 1 (one) time per week.  Encounter for monitoring diuretic therapy  -     Magnesium; Future  Anxiety is stable.      Will decrease metoprolol as prefer she be on less for a variety of reasons: hx depression, is on insulin.  BP is lower than it needs to be. She is asymptomatic from this.

## 2023-09-05 ENCOUNTER — OFFICE VISIT (OUTPATIENT)
Dept: PRIMARY CARE | Facility: CLINIC | Age: 68
End: 2023-09-05
Payer: MEDICARE

## 2023-09-05 ENCOUNTER — LAB (OUTPATIENT)
Dept: LAB | Facility: LAB | Age: 68
End: 2023-09-05
Payer: MEDICARE

## 2023-09-05 VITALS
WEIGHT: 170 LBS | HEIGHT: 67 IN | BODY MASS INDEX: 26.68 KG/M2 | RESPIRATION RATE: 18 BRPM | DIASTOLIC BLOOD PRESSURE: 60 MMHG | SYSTOLIC BLOOD PRESSURE: 104 MMHG | HEART RATE: 72 BPM

## 2023-09-05 DIAGNOSIS — I10 HYPERTENSION, UNSPECIFIED TYPE: ICD-10-CM

## 2023-09-05 DIAGNOSIS — Z79.899 ENCOUNTER FOR MONITORING DIURETIC THERAPY: ICD-10-CM

## 2023-09-05 DIAGNOSIS — E11.9 TYPE 2 DIABETES MELLITUS TREATED WITH INSULIN (MULTI): ICD-10-CM

## 2023-09-05 DIAGNOSIS — I35.8 AORTIC VALVE SCLEROSIS: ICD-10-CM

## 2023-09-05 DIAGNOSIS — Z79.4 TYPE 2 DIABETES MELLITUS TREATED WITH INSULIN (MULTI): ICD-10-CM

## 2023-09-05 DIAGNOSIS — Z51.81 ENCOUNTER FOR MONITORING DIURETIC THERAPY: ICD-10-CM

## 2023-09-05 DIAGNOSIS — E11.65 CONTROLLED TYPE 2 DIABETES MELLITUS WITH HYPERGLYCEMIA, WITH LONG-TERM CURRENT USE OF INSULIN (MULTI): ICD-10-CM

## 2023-09-05 DIAGNOSIS — Z79.899 MEDICATION DOSE CHANGED: ICD-10-CM

## 2023-09-05 DIAGNOSIS — Z79.4 CONTROLLED TYPE 2 DIABETES MELLITUS WITH HYPERGLYCEMIA, WITH LONG-TERM CURRENT USE OF INSULIN (MULTI): ICD-10-CM

## 2023-09-05 DIAGNOSIS — E87.6 HYPOKALEMIA: ICD-10-CM

## 2023-09-05 DIAGNOSIS — Z23 NEED FOR PNEUMOCOCCAL VACCINATION: Primary | ICD-10-CM

## 2023-09-05 PROBLEM — M25.512 SHOULDER PAIN, LEFT: Status: ACTIVE | Noted: 2023-09-05

## 2023-09-05 PROBLEM — R03.1 LOW BLOOD PRESSURE READING: Status: ACTIVE | Noted: 2023-09-05

## 2023-09-05 LAB
ANION GAP IN SER/PLAS: 15 MMOL/L (ref 10–20)
CALCIUM (MG/DL) IN SER/PLAS: 10 MG/DL (ref 8.6–10.6)
CARBON DIOXIDE, TOTAL (MMOL/L) IN SER/PLAS: 29 MMOL/L (ref 21–32)
CHLORIDE (MMOL/L) IN SER/PLAS: 100 MMOL/L (ref 98–107)
CREATININE (MG/DL) IN SER/PLAS: 0.78 MG/DL (ref 0.5–1.05)
GFR FEMALE: 82 ML/MIN/1.73M2
GLUCOSE (MG/DL) IN SER/PLAS: 178 MG/DL (ref 74–99)
MAGNESIUM (MG/DL) IN SER/PLAS: 2.28 MG/DL (ref 1.6–2.4)
POC HEMOGLOBIN A1C: 8.1 % (ref 4.2–6.5)
POTASSIUM (MMOL/L) IN SER/PLAS: 4.7 MMOL/L (ref 3.5–5.3)
SODIUM (MMOL/L) IN SER/PLAS: 139 MMOL/L (ref 136–145)
UREA NITROGEN (MG/DL) IN SER/PLAS: 19 MG/DL (ref 6–23)

## 2023-09-05 PROCEDURE — 4010F ACE/ARB THERAPY RXD/TAKEN: CPT | Performed by: INTERNAL MEDICINE

## 2023-09-05 PROCEDURE — 36415 COLL VENOUS BLD VENIPUNCTURE: CPT

## 2023-09-05 PROCEDURE — 3051F HG A1C>EQUAL 7.0%<8.0%: CPT | Performed by: INTERNAL MEDICINE

## 2023-09-05 PROCEDURE — 83036 HEMOGLOBIN GLYCOSYLATED A1C: CPT | Performed by: INTERNAL MEDICINE

## 2023-09-05 PROCEDURE — 1126F AMNT PAIN NOTED NONE PRSNT: CPT | Performed by: INTERNAL MEDICINE

## 2023-09-05 PROCEDURE — 99215 OFFICE O/P EST HI 40 MIN: CPT | Performed by: INTERNAL MEDICINE

## 2023-09-05 PROCEDURE — 3078F DIAST BP <80 MM HG: CPT | Performed by: INTERNAL MEDICINE

## 2023-09-05 PROCEDURE — 80048 BASIC METABOLIC PNL TOTAL CA: CPT

## 2023-09-05 PROCEDURE — 1157F ADVNC CARE PLAN IN RCRD: CPT | Performed by: INTERNAL MEDICINE

## 2023-09-05 PROCEDURE — 83735 ASSAY OF MAGNESIUM: CPT

## 2023-09-05 PROCEDURE — 1160F RVW MEDS BY RX/DR IN RCRD: CPT | Performed by: INTERNAL MEDICINE

## 2023-09-05 PROCEDURE — 3074F SYST BP LT 130 MM HG: CPT | Performed by: INTERNAL MEDICINE

## 2023-09-05 PROCEDURE — 1036F TOBACCO NON-USER: CPT | Performed by: INTERNAL MEDICINE

## 2023-09-05 PROCEDURE — 1159F MED LIST DOCD IN RCRD: CPT | Performed by: INTERNAL MEDICINE

## 2023-09-05 RX ORDER — METOPROLOL SUCCINATE 50 MG/1
50 TABLET, EXTENDED RELEASE ORAL DAILY
Qty: 90 TABLET | Refills: 3 | Status: SHIPPED | OUTPATIENT
Start: 2023-09-05 | End: 2023-12-12 | Stop reason: DRUGHIGH

## 2023-09-05 RX ORDER — METOPROLOL SUCCINATE 200 MG/1
200 TABLET, EXTENDED RELEASE ORAL DAILY
Qty: 90 TABLET | Refills: 2 | Status: CANCELLED | OUTPATIENT
Start: 2023-09-05

## 2023-09-05 RX ORDER — DULAGLUTIDE 4.5 MG/.5ML
4.5 INJECTION, SOLUTION SUBCUTANEOUS
Qty: 4 PEN | Refills: 10 | Status: SHIPPED | OUTPATIENT
Start: 2023-09-05 | End: 2023-12-12 | Stop reason: SDUPTHER

## 2023-09-05 RX ORDER — METOPROLOL SUCCINATE 100 MG/1
100 TABLET, EXTENDED RELEASE ORAL DAILY
Qty: 90 TABLET | Refills: 3 | Status: SHIPPED | OUTPATIENT
Start: 2023-09-05 | End: 2024-03-18 | Stop reason: SDUPTHER

## 2023-09-05 RX ORDER — INSULIN GLARGINE 100 [IU]/ML
22 INJECTION, SOLUTION SUBCUTANEOUS NIGHTLY
Qty: 30 ML | Refills: 3 | Status: SHIPPED | OUTPATIENT
Start: 2023-09-05 | End: 2023-12-12 | Stop reason: SDUPTHER

## 2023-09-05 RX ORDER — PEN NEEDLE, DIABETIC 29 G X1/2"
NEEDLE, DISPOSABLE MISCELLANEOUS
Qty: 100 EACH | Refills: 3 | Status: SHIPPED | OUTPATIENT
Start: 2023-09-05 | End: 2023-12-12 | Stop reason: SDUPTHER

## 2023-09-05 RX ORDER — SPIRONOLACTONE 25 MG/1
25 TABLET ORAL DAILY
Qty: 90 TABLET | Refills: 2 | Status: SHIPPED | OUTPATIENT
Start: 2023-09-05 | End: 2024-03-18 | Stop reason: SDUPTHER

## 2023-09-05 ASSESSMENT — ENCOUNTER SYMPTOMS
CONFUSION: 0
BLACKOUTS: 0
DIZZINESS: 0
WEAKNESS: 0
FATIGUE: 0
SPEECH DIFFICULTY: 0
WEIGHT LOSS: 0
NERVOUS/ANXIOUS: 0
BLURRED VISION: 0
HUNGER: 0
POLYPHAGIA: 0
TREMORS: 0
DIABETIC ASSOCIATED SYMPTOMS: 0
SWEATS: 0
SEIZURES: 0
VISUAL CHANGE: 0
HEADACHES: 0
POLYDIPSIA: 0

## 2023-09-05 ASSESSMENT — PAIN SCALES - GENERAL: PAINLEVEL: 0-NO PAIN

## 2023-09-05 NOTE — PATIENT INSTRUCTIONS
Medication changes:  When the new metoprolol doses arrive in the mail:  stop the 200 mg metoprolol succinate ER and   start taking one of the 100 mg AND one of the 50 mg metoprolol succinate ER tabs.   We are decreasing your total metoprolol succinate dose to 150 mg daily.  We are doing this because your blood pressure is running lower, and prefer you be on less of a beta blocker for other reasons as well.    Increase Lantus to 22 units at night.  Keep other medications as they are.  Blood test soon or today to check on electrolytes with diuretic medications.    Cut back or cut out the ice cream.    Flu shot October.  Pneumonia shot: Prevnar 13 or 15 or 20 anytime between now and when you get your flu shot.    Updated covid vaccine this fall when it comes out.    Follow up in 3 months with A1c at that visit. Call if any problems.

## 2023-09-10 PROBLEM — Z79.899 ENCOUNTER FOR MONITORING DIURETIC THERAPY: Status: ACTIVE | Noted: 2023-05-09

## 2023-09-10 PROBLEM — Z51.81 ENCOUNTER FOR MONITORING DIURETIC THERAPY: Status: ACTIVE | Noted: 2023-05-09

## 2023-09-10 PROBLEM — Z79.899 MEDICATION DOSE CHANGED: Status: ACTIVE | Noted: 2023-09-10

## 2023-09-10 PROBLEM — Z79.4 CONTROLLED TYPE 2 DIABETES MELLITUS WITH HYPERGLYCEMIA, WITH LONG-TERM CURRENT USE OF INSULIN (MULTI): Status: ACTIVE | Noted: 2023-09-10

## 2023-09-10 PROBLEM — E11.65 CONTROLLED TYPE 2 DIABETES MELLITUS WITH HYPERGLYCEMIA, WITH LONG-TERM CURRENT USE OF INSULIN (MULTI): Status: ACTIVE | Noted: 2023-09-10

## 2023-12-11 ASSESSMENT — ENCOUNTER SYMPTOMS
BLACKOUTS: 0
TREMORS: 0
VISUAL CHANGE: 0
SWEATS: 0
CONFUSION: 0
WEAKNESS: 0
POLYPHAGIA: 0
NERVOUS/ANXIOUS: 0
SEIZURES: 0
HUNGER: 0
FATIGUE: 0
SPEECH DIFFICULTY: 0
DIZZINESS: 0
WEIGHT LOSS: 0
HEADACHES: 0
BLURRED VISION: 0
POLYDIPSIA: 0

## 2023-12-11 NOTE — PROGRESS NOTES
"Subjective   Patient ID: Alexia Cho \"Corina" is a 68 y.o. female who presents for Follow-up (3 MO FUV with A1C. Pt denies any new problems. ).  LAST VISIT PLAN 9/5/23  Instructions    Medication changes:  When the new metoprolol doses arrive in the mail:  stop the 200 mg metoprolol succinate ER and   start taking one of the 100 mg AND one of the 50 mg metoprolol succinate ER tabs.   We are decreasing your total metoprolol succinate dose to 150 mg daily.  We are doing this because your blood pressure is running lower, and prefer you be on less of a beta blocker for other reasons as well.     Increase Lantus to 22 units at night.  Keep other medications as they are.  Blood test soon or today to check on electrolytes with diuretic medications.     Cut back or cut out the ice cream.     Flu shot October.  Pneumonia shot: Prevnar 13 or 15 or 20 anytime between now and when you get your flu shot.     Updated covid vaccine this fall when it comes out.     Follow up in 3 months with A1c at that visit. Call if any problems.        END INFO FROM PRIOR VISIT  Diabetes  She has type 2 diabetes mellitus. No MedicAlert identification noted. The initial diagnosis of diabetes was made 10 years ago. Pertinent negatives for hypoglycemia include no confusion, dizziness, headaches, hunger, mood changes, nervousness/anxiousness, pallor, seizures, sleepiness, speech difficulty, sweats or tremors. Pertinent negatives for diabetes include no blurred vision, no chest pain, no fatigue, no foot paresthesias, no foot ulcerations, no polydipsia, no polyphagia, no polyuria, no visual change, no weakness and no weight loss. Pertinent negatives for hypoglycemia complications include no blackouts, no hospitalization, no nocturnal hypoglycemia, no required assistance and no required glucagon injection. Pertinent negatives for diabetic complications include no CVA, heart disease, impotence, nephropathy, peripheral neuropathy, PVD or " retinopathy. Risk factors for coronary artery disease include dyslipidemia, family history, hypertension and obesity. Current diabetic treatment includes oral agent (dual therapy). She is compliant with treatment some of the time. She is currently taking insulin at bedtime. Insulin injections are given by patient. Rotation sites for injection include the abdominal wall. She is following a generally healthy and low salt diet. Meal planning includes avoidance of concentrated sweets and carbohydrate counting. She has not had a previous visit with a dietitian. She participates in exercise three times a week. She monitors blood glucose at home 1-2 x per day. She monitors urine at home <1 x per month. Blood glucose monitoring compliance is adequate. Her home blood glucose trend is increasing steadily. Her breakfast blood glucose is taken between 7-8 am. Her breakfast blood glucose range is generally 140-180 mg/dl. Her lunch blood glucose is taken between 12-1 pm. Her lunch blood glucose range is generally 140-180 mg/dl. Her dinner blood glucose is taken between 5-6 pm. Her dinner blood glucose range is generally 140-180 mg/dl. Her overall blood glucose range is 140-180 mg/dl. She does not see a podiatrist.Eye exam is current.       Scribe Transcription:  Cardiac: No CP , palpitations, increased luna  Resp: No sob, wheezing, cough  Extremities: No leg or ankle swelling, no claudication  Neuro: No dizziness, syncope, blurred vision. No new headaches.     Diabetes:Type II:Is taking medications regularly, denies side effects.  Denies hypoglycemia, polyuria, polydipsia.  No new numbness or paresthesias of extremities.No syncope or dizziness.No blurred vision.  Lab Results   Component Value Date    HGBA1C 8.8 (A) 12/12/2023    HGBA1C 8.1 (A) 09/05/2023    HGBA1C 7.8 (A) 05/09/2023     Lab Results   Component Value Date    CREATININE 0.78 09/05/2023         Lab Results   Component Value Date    GLUCOSE 178 (H) 09/05/2023     "CALCIUM 10.0 09/05/2023     09/05/2023    K 4.7 09/05/2023    CO2 29 09/05/2023     09/05/2023    BUN 19 09/05/2023    CREATININE 0.78 09/05/2023      No results found for this or any previous visit (from the past 4464 hour(s)).      GLP-1 agonists (Trulicity, Victoza, Ozempic, Wegovy, Rybelsus, Bydureon): No nausea, vomiting, abdominal pain and anterior neck pain/swelling.   SGLT-2 inhibitors (Farxiga, Jardiance, Invokana): No hematuria, dysuria, or yeast infections.      She states she has been taking her diabetes medications.       Review of Systems   Constitutional:  Negative for fatigue and weight loss.   Eyes:  Negative for blurred vision.   Cardiovascular:  Negative for chest pain.   Endocrine: Negative for polydipsia, polyphagia and polyuria.   Genitourinary:  Negative for impotence.   Skin:  Negative for pallor.   Neurological:  Negative for dizziness, tremors, seizures, speech difficulty, weakness and headaches.   Psychiatric/Behavioral:  Negative for confusion. The patient is not nervous/anxious.        Current Outpatient Medications   Medication Instructions    amLODIPine (Norvasc) 10 mg tablet TAKE 1 TABLET DAILY    amoxicillin (AMOXIL) 2,000 mg, oral, As needed    atorvastatin (Lipitor) 20 mg tablet 1 tablet, oral, Nightly    BD Insulin Syringe Ultra-Fine 0.3 mL 31 gauge x 5/16\" syringe use as directed once daily    cholecalciferol (Vitamin D-3) 50 mcg (2,000 unit) capsule oral    cyanocobalamin (Vitamin B-12) 1,000 mcg tablet 1 tablet, oral, Daily    DULoxetine (Cymbalta) 60 mg DR capsule oral    estradiol (Estrace) 0.01 % (0.1 mg/gram) vaginal cream INSERT 1/4TH GRAM IN VAGINA DAILY OR AS DIRECTED AND CAN RUB SMALL AMOUNT OF CREAM EXTERNALLY    Farxiga 10 mg take 1 tablet by mouth every morning BEFORE A MEAL    FreeStyle Lancets 28 gauge use 1 LANCET to TEST BLOOD SUGAR twice a day    FreeStyle Darren 14 Day Sensor kit APPLY DEVICE TO POSTERIOR ARM AND LEAVE FOR 14 DAYS. USE REMOTE TO SCAN " "AND REVIEW SUGARS    FreeStyle Lite Strips strip use 1 TEST STRIP to TEST BLOOD SUGAR once daily    hydroCHLOROthiazide (HYDRODiuril) 25 mg tablet TAKE ONE-HALF (1/2) TABLET DAILY (NEW DOSE)    Lactobacillus rhamnosus GG (CULTURELLE) 15 billion cell capsule, sprinkle capsule oral    Lantus U-100 Insulin 22 Units, subcutaneous, Nightly, Take as directed per insulin instructions.    metoprolol succinate XL (TOPROL XL) 100 mg, oral, Daily, Take with a 50 mg tab for total of 150 mg daily.Do not crush or chew.    metoprolol succinate XL (TOPROL XL) 50 mg, oral, Daily, Take with a 100 mg tab for total of 150 mg daily. Do not crush or chew.    mirtazapine (Remeron) 30 mg tablet 1 tablet, oral, Nightly    psyllium (Metamucil, sugar,) powder oral, Daily RT    ramipril (ALTACE) 10 mg, oral, Every 12 hours    spironolactone (ALDACTONE) 25 mg, oral, Daily    Trulicity 4.5 mg, subcutaneous, Weekly     Allergies   Allergen Reactions    Ciprofloxacin Hives    Diclofenac Sodium Unknown    Empagliflozin Other    Erythromycin Base Diarrhea    Naproxen Diarrhea     Objective   Lab Results   Component Value Date    WBC 9.1 01/20/2023    HGB 15.1 01/20/2023    HCT 46.0 01/20/2023     01/20/2023    CHOL 150 01/20/2023    TRIG 150 (H) 01/20/2023    HDL 38.4 (A) 01/20/2023    ALT 26 01/20/2023    AST 16 01/20/2023     09/05/2023    K 4.7 09/05/2023     09/05/2023    CREATININE 0.78 09/05/2023    BUN 19 09/05/2023    CO2 29 09/05/2023    TSH 1.52 01/20/2023    HGBA1C 8.8 (A) 12/12/2023       Physical ExamBP 96/52 (BP Location: Right arm, Patient Position: Sitting, BP Cuff Size: Adult)   Pulse 64   Resp 18   Ht 1.702 m (5' 7\")   Wt 78.9 kg (174 lb)   BMI 27.25 kg/m²       10/5/2022     9:17 AM 1/31/2023     9:43 AM 5/9/2023    11:27 AM 5/9/2023    12:41 PM 9/5/2023     9:08 AM 9/5/2023     9:57 AM 12/12/2023     9:49 AM   Vitals   Systolic 100 106 102 110 98 104 96   Diastolic 60 62 54 70 54 60 52   Heart Rate  64 64  " "72 72 64   Resp  18 18  18  18   Height (in)   1.676 m (5' 6\")  1.689 m (5' 6.5\")  1.702 m (5' 7\")   Weight (lb)  170 170  170  174   BMI  26.63 kg/m2 27.44 kg/m2  27.03 kg/m2  27.25 kg/m2   BSA (m2)  1.91 m2 1.89 m2  1.9 m2  1.93 m2   Visit Report   Report Report Report Report Report     She has not checked her bp at home.  Patient looks well and is in no obvious distress.  HEENT:   Normocephalic, no facial asymmetry  Eyes: Sclera and conjunctiva are clear.  Neck: No adenopathy cervical (Ant/post/lat), no Supraclavicular nodes.   Thyroid normal.   Carotid pulses normal, no carotid bruits.  Lungs : RR normal. Clear to auscultation anterior, posterior and lateral. No rales, wheezes rhonchi or rubs. Good air exchange.  Heart: RRR. No Murmur, gallop, click or rub.  Abdomen: Bowel sounds normal, No bruits. No pulsatile mass. No hepatosplenomegaly, masses or tenderness. Soft, no guarding.  Vascular:  Posterior tibialis and dorsalis pedis pulses within normal limits bilaterally.   Extremities: no upper extremity edema. No lower extremity edema.   Musculoskeletal: no synovitis of joints seen. No new deformity.  Neuro: CN 2-12 intact. Alert, appropriate.   Ambulates independently.  No gross motor deficit.   No tremors.  Psych: normal mood and affect.  Skin: No rash, bruising petechiae or jaundice.  Eye ck sept was ok, no retinopathy.  Diabetic foot exam:  Skin normal no lesions.  DP and PT pulses within normal limits.  Callus: none  Deformities: none.  Monofilament testing: Normal.     Alexia was seen today for follow-up.  Diagnoses and all orders for this visit:  Primary hypertension (Primary)  -     Comprehensive Metabolic Panel; Future  -     Albumin , Urine Random; Future  Hyperlipidemia due to type 2 diabetes mellitus (CMS/HCC)  -     POCT glycosylated hemoglobin (Hb A1C) manually resulted  -     Lipid Panel; Future  -     Comprehensive Metabolic Panel; Future  -     TSH with reflex to Free T4 if abnormal; Future  -   " "  Cholesterol, LDL Direct; Future  Need for hepatitis C screening test  -     Hepatitis C antibody; Future  Visit for screening mammogram  -     BI mammo bilateral screening tomosynthesis; Future  Medication dose changed  Low blood pressure reading  Type 2 diabetes mellitus with hyperglycemia, with long-term current use of insulin (CMS/[facility])  -     CBC and Auto Differential; Future  -     Comprehensive Metabolic Panel; Future  -     Albumin , Urine Random; Future  Vitamin B12 deficiency (non anemic)  -     CBC and Auto Differential; Future  -     Vitamin B12; Future  Encounter for monitoring diuretic therapy  -     Comprehensive Metabolic Panel; Future  Vitamin D deficiency  -     Vitamin D 25-Hydroxy,Total (for eval of Vitamin D levels); Future  Type 2 diabetes mellitus treated with insulin (CMS/[facility])  -     Trulicity 4.5 mg/0.5 mL pen injector; Inject 4.5 mg under the skin 1 (one) time per week.  -     Lantus U-100 Insulin 100 unit/mL injection; Inject 22 Units under the skin once daily at bedtime. Take as directed per insulin instructions.  -     FreeStyle Darren 14 Day Sensor kit; APPLY DEVICE TO POSTERIOR ARM AND LEAVE FOR 14 DAYS. USE REMOTE TO SCAN AND REVIEW SUGARS  -     BD Insulin Syringe Ultra-Fine 0.3 mL 31 gauge x 5/16\" syringe; use as directed once daily  -     Hemoglobin A1c; Future  Encounter for diabetic foot exam (CMS/[facility])  Normal diab foot exam  Bp continues to run lower so will continue to cut back on metoprolol-  Counseled on diet  12 orders placed.change location of trulicity consider changing to other glp 1 if A1c not down  She was back to eating cinnamon life cereal and was taking farxiga after breakfast, modifying this behavior should help      See wrap up section for patient instructions and  additional treatment plan.     Take the Farxiga BEFORE breakfast.    Use the upper abdomen and mid abdomen  for Trulicity.    Decrease metoprolol from 150 mg to 100 mg once per day. Blood pressure is " mildly low  and heart rate is not too fast. Check blood pressure In a week or 2 after doing this to be sure not elevated.    Stop eating cereal in the am. Only homemade steel cut oats with no sugar would be a cereal that is ok.  Stop the fast food. If culvers, no damaris slaw , no sauce and no rolls. Less of the fries. Cut fast food to once per week at the most.    Check sugars 2 hours after a fast food meal to see how it affect you.    Fasting blood test soon.  Instructions for obtaining lab tests:   You do not need a paper requisition when obtaining tests at a  facility. No appointment is needed for lab tests.  For fasting blood tests:  Please do not consume calories for 10-12 hours prior to this blood test. It is ok to drink water, you should be hydrated.  Please do not take vitamins, supplements or thyroid medication before your blood is drawn this day.   Most lab results should be available for your review on the  My Chart portal within 48 hours. Please contact the office if you have not received results within 2 weeks of obtaining the test.      If sugars are not coming down with the changes above, call or message as would consider increasing your insulin.  Follow up with  Dr Wasserman  in march. Do A1c at the lab March 12 or  a few days after  and see me later in the month.    Covid booster recommended.    Also recommend RSV vaccine.

## 2023-12-12 ENCOUNTER — OFFICE VISIT (OUTPATIENT)
Dept: PRIMARY CARE | Facility: CLINIC | Age: 68
End: 2023-12-12
Payer: MEDICARE

## 2023-12-12 VITALS
WEIGHT: 174 LBS | BODY MASS INDEX: 27.31 KG/M2 | SYSTOLIC BLOOD PRESSURE: 96 MMHG | DIASTOLIC BLOOD PRESSURE: 52 MMHG | HEIGHT: 67 IN | HEART RATE: 64 BPM | RESPIRATION RATE: 18 BRPM

## 2023-12-12 DIAGNOSIS — Z79.899 ENCOUNTER FOR MONITORING DIURETIC THERAPY: ICD-10-CM

## 2023-12-12 DIAGNOSIS — E78.5 HYPERLIPIDEMIA DUE TO TYPE 2 DIABETES MELLITUS (MULTI): ICD-10-CM

## 2023-12-12 DIAGNOSIS — E55.9 VITAMIN D DEFICIENCY: ICD-10-CM

## 2023-12-12 DIAGNOSIS — Z12.31 VISIT FOR SCREENING MAMMOGRAM: ICD-10-CM

## 2023-12-12 DIAGNOSIS — Z51.81 ENCOUNTER FOR MONITORING DIURETIC THERAPY: ICD-10-CM

## 2023-12-12 DIAGNOSIS — E11.9 TYPE 2 DIABETES MELLITUS TREATED WITH INSULIN (MULTI): ICD-10-CM

## 2023-12-12 DIAGNOSIS — Z79.4 TYPE 2 DIABETES MELLITUS TREATED WITH INSULIN (MULTI): ICD-10-CM

## 2023-12-12 DIAGNOSIS — E11.9 ENCOUNTER FOR DIABETIC FOOT EXAM (MULTI): ICD-10-CM

## 2023-12-12 DIAGNOSIS — I10 PRIMARY HYPERTENSION: Primary | ICD-10-CM

## 2023-12-12 DIAGNOSIS — Z79.4 TYPE 2 DIABETES MELLITUS WITH HYPERGLYCEMIA, WITH LONG-TERM CURRENT USE OF INSULIN (MULTI): ICD-10-CM

## 2023-12-12 DIAGNOSIS — E11.65 TYPE 2 DIABETES MELLITUS WITH HYPERGLYCEMIA, WITH LONG-TERM CURRENT USE OF INSULIN (MULTI): ICD-10-CM

## 2023-12-12 DIAGNOSIS — E53.8 VITAMIN B12 DEFICIENCY (NON ANEMIC): ICD-10-CM

## 2023-12-12 DIAGNOSIS — Z11.59 NEED FOR HEPATITIS C SCREENING TEST: ICD-10-CM

## 2023-12-12 DIAGNOSIS — E11.69 HYPERLIPIDEMIA DUE TO TYPE 2 DIABETES MELLITUS (MULTI): ICD-10-CM

## 2023-12-12 DIAGNOSIS — R03.1 LOW BLOOD PRESSURE READING: ICD-10-CM

## 2023-12-12 DIAGNOSIS — Z79.899 MEDICATION DOSE CHANGED: ICD-10-CM

## 2023-12-12 LAB — POC HEMOGLOBIN A1C: 8.8 % (ref 4.2–6.5)

## 2023-12-12 PROCEDURE — 3048F LDL-C <100 MG/DL: CPT | Performed by: INTERNAL MEDICINE

## 2023-12-12 PROCEDURE — 99215 OFFICE O/P EST HI 40 MIN: CPT | Performed by: INTERNAL MEDICINE

## 2023-12-12 PROCEDURE — 3062F POS MACROALBUMINURIA REV: CPT | Performed by: INTERNAL MEDICINE

## 2023-12-12 PROCEDURE — 1159F MED LIST DOCD IN RCRD: CPT | Performed by: INTERNAL MEDICINE

## 2023-12-12 PROCEDURE — 4010F ACE/ARB THERAPY RXD/TAKEN: CPT | Performed by: INTERNAL MEDICINE

## 2023-12-12 PROCEDURE — 83036 HEMOGLOBIN GLYCOSYLATED A1C: CPT | Performed by: INTERNAL MEDICINE

## 2023-12-12 PROCEDURE — 1126F AMNT PAIN NOTED NONE PRSNT: CPT | Performed by: INTERNAL MEDICINE

## 2023-12-12 PROCEDURE — 1036F TOBACCO NON-USER: CPT | Performed by: INTERNAL MEDICINE

## 2023-12-12 PROCEDURE — 3051F HG A1C>EQUAL 7.0%<8.0%: CPT | Performed by: INTERNAL MEDICINE

## 2023-12-12 PROCEDURE — 3078F DIAST BP <80 MM HG: CPT | Performed by: INTERNAL MEDICINE

## 2023-12-12 PROCEDURE — 1160F RVW MEDS BY RX/DR IN RCRD: CPT | Performed by: INTERNAL MEDICINE

## 2023-12-12 PROCEDURE — 3074F SYST BP LT 130 MM HG: CPT | Performed by: INTERNAL MEDICINE

## 2023-12-12 RX ORDER — FLASH GLUCOSE SENSOR
KIT MISCELLANEOUS
Qty: 2 EACH | Refills: 11 | Status: SHIPPED | OUTPATIENT
Start: 2023-12-12

## 2023-12-12 RX ORDER — PEN NEEDLE, DIABETIC 29 G X1/2"
NEEDLE, DISPOSABLE MISCELLANEOUS
Qty: 100 EACH | Refills: 3 | Status: SHIPPED | OUTPATIENT
Start: 2023-12-12 | End: 2024-05-15 | Stop reason: SDUPTHER

## 2023-12-12 RX ORDER — INSULIN GLARGINE 100 [IU]/ML
22 INJECTION, SOLUTION SUBCUTANEOUS NIGHTLY
Qty: 30 ML | Refills: 3 | Status: SHIPPED | OUTPATIENT
Start: 2023-12-12 | End: 2024-03-18 | Stop reason: SDUPTHER

## 2023-12-12 RX ORDER — DULAGLUTIDE 4.5 MG/.5ML
4.5 INJECTION, SOLUTION SUBCUTANEOUS
Qty: 4 PEN | Refills: 10 | Status: SHIPPED | OUTPATIENT
Start: 2023-12-12 | End: 2024-04-24 | Stop reason: SDUPTHER

## 2023-12-12 ASSESSMENT — ENCOUNTER SYMPTOMS
NERVOUS/ANXIOUS: 0
SEIZURES: 0
SWEATS: 0
HUNGER: 0
HEADACHES: 0
SPEECH DIFFICULTY: 0
BLACKOUTS: 0
WEAKNESS: 0
TREMORS: 0
VISUAL CHANGE: 0
WEIGHT LOSS: 0
POLYDIPSIA: 0
BLURRED VISION: 0
FATIGUE: 0
CONFUSION: 0
POLYPHAGIA: 0
DIZZINESS: 0

## 2023-12-12 ASSESSMENT — PAIN SCALES - GENERAL: PAINLEVEL: 0-NO PAIN

## 2023-12-12 NOTE — PATIENT INSTRUCTIONS
Take the Farxiga BEFORE breakfast.    Use the upper abdomen and mid abdomen  for Trulicity.    Decrease metoprolol from 150 mg to 100 mg once per day. Blood pressure is mildly low  and heart rate is not too fast. Check blood pressure In a week or 2 after doing this to be sure not elevated.    Stop eating cereal in the am. Only homemade steel cut oats with no sugar would be a cereal that is ok.  Stop the fast food. If culvers, no damaris slaw , no sauce and no rolls. Less of the fries. Cut fast food to once per week at the most.    Check sugars 2 hours after a fast food meal to see how it affect you.    Fasting blood test soon.  Instructions for obtaining lab tests:   You do not need a paper requisition when obtaining tests at a  facility. No appointment is needed for lab tests.  For fasting blood tests:  Please do not consume calories for 10-12 hours prior to this blood test. It is ok to drink water, you should be hydrated.  Please do not take vitamins, supplements or thyroid medication before your blood is drawn this day.   Most lab results should be available for your review on the  My Chart portal within 48 hours. Please contact the office if you have not received results within 2 weeks of obtaining the test.      If sugars are not coming down with the changes above, call or message as would consider increasing your insulin.  Follow up with  Dr Wasserman  in march. Do A1c at the lab March 12 or  a few days after  and see me later in the month.    Covid booster recommended.    Also recommend RSV vaccine.

## 2023-12-15 ENCOUNTER — LAB (OUTPATIENT)
Dept: LAB | Facility: LAB | Age: 68
End: 2023-12-15
Payer: MEDICARE

## 2023-12-15 DIAGNOSIS — Z51.81 ENCOUNTER FOR MONITORING DIURETIC THERAPY: ICD-10-CM

## 2023-12-15 DIAGNOSIS — Z11.59 NEED FOR HEPATITIS C SCREENING TEST: ICD-10-CM

## 2023-12-15 DIAGNOSIS — I10 PRIMARY HYPERTENSION: ICD-10-CM

## 2023-12-15 DIAGNOSIS — E53.8 VITAMIN B12 DEFICIENCY (NON ANEMIC): ICD-10-CM

## 2023-12-15 DIAGNOSIS — E55.9 VITAMIN D DEFICIENCY: ICD-10-CM

## 2023-12-15 DIAGNOSIS — E78.5 HYPERLIPIDEMIA DUE TO TYPE 2 DIABETES MELLITUS (MULTI): ICD-10-CM

## 2023-12-15 DIAGNOSIS — E11.69 HYPERLIPIDEMIA DUE TO TYPE 2 DIABETES MELLITUS (MULTI): ICD-10-CM

## 2023-12-15 DIAGNOSIS — E11.65 TYPE 2 DIABETES MELLITUS WITH HYPERGLYCEMIA, WITH LONG-TERM CURRENT USE OF INSULIN (MULTI): ICD-10-CM

## 2023-12-15 DIAGNOSIS — Z79.899 ENCOUNTER FOR MONITORING DIURETIC THERAPY: ICD-10-CM

## 2023-12-15 DIAGNOSIS — Z79.4 TYPE 2 DIABETES MELLITUS WITH HYPERGLYCEMIA, WITH LONG-TERM CURRENT USE OF INSULIN (MULTI): ICD-10-CM

## 2023-12-15 LAB
25(OH)D3 SERPL-MCNC: 60 NG/ML (ref 30–100)
ALBUMIN SERPL BCP-MCNC: 4.4 G/DL (ref 3.4–5)
ALP SERPL-CCNC: 68 U/L (ref 33–136)
ALT SERPL W P-5'-P-CCNC: 27 U/L (ref 7–45)
ANION GAP SERPL CALC-SCNC: 15 MMOL/L (ref 10–20)
AST SERPL W P-5'-P-CCNC: 23 U/L (ref 9–39)
BASOPHILS # BLD AUTO: 0.07 X10*3/UL (ref 0–0.1)
BASOPHILS NFR BLD AUTO: 1 %
BILIRUB SERPL-MCNC: 0.7 MG/DL (ref 0–1.2)
BUN SERPL-MCNC: 20 MG/DL (ref 6–23)
CALCIUM SERPL-MCNC: 10 MG/DL (ref 8.6–10.6)
CHLORIDE SERPL-SCNC: 100 MMOL/L (ref 98–107)
CHOLEST SERPL-MCNC: 133 MG/DL (ref 0–199)
CHOLESTEROL/HDL RATIO: 3.9
CO2 SERPL-SCNC: 28 MMOL/L (ref 21–32)
CREAT SERPL-MCNC: 0.84 MG/DL (ref 0.5–1.05)
CREAT UR-MCNC: 78.1 MG/DL (ref 20–320)
EOSINOPHIL # BLD AUTO: 0.24 X10*3/UL (ref 0–0.7)
EOSINOPHIL NFR BLD AUTO: 3.3 %
ERYTHROCYTE [DISTWIDTH] IN BLOOD BY AUTOMATED COUNT: 13 % (ref 11.5–14.5)
GFR SERPL CREATININE-BSD FRML MDRD: 75 ML/MIN/1.73M*2
GLUCOSE SERPL-MCNC: 171 MG/DL (ref 74–99)
HCT VFR BLD AUTO: 46.3 % (ref 36–46)
HCV AB SER QL: NONREACTIVE
HDLC SERPL-MCNC: 34.1 MG/DL
HGB BLD-MCNC: 15.8 G/DL (ref 12–16)
IMM GRANULOCYTES # BLD AUTO: 0.03 X10*3/UL (ref 0–0.7)
IMM GRANULOCYTES NFR BLD AUTO: 0.4 % (ref 0–0.9)
LDLC SERPL CALC-MCNC: 78 MG/DL
LDLC SERPL DIRECT ASSAY-MCNC: 88 MG/DL (ref 0–129)
LYMPHOCYTES # BLD AUTO: 1.93 X10*3/UL (ref 1.2–4.8)
LYMPHOCYTES NFR BLD AUTO: 26.7 %
MCH RBC QN AUTO: 31.9 PG (ref 26–34)
MCHC RBC AUTO-ENTMCNC: 34.1 G/DL (ref 32–36)
MCV RBC AUTO: 94 FL (ref 80–100)
MICROALBUMIN UR-MCNC: <7 MG/L
MICROALBUMIN/CREAT UR: NORMAL MG/G{CREAT}
MONOCYTES # BLD AUTO: 0.81 X10*3/UL (ref 0.1–1)
MONOCYTES NFR BLD AUTO: 11.2 %
NEUTROPHILS # BLD AUTO: 4.14 X10*3/UL (ref 1.2–7.7)
NEUTROPHILS NFR BLD AUTO: 57.4 %
NON HDL CHOLESTEROL: 99 MG/DL (ref 0–149)
NRBC BLD-RTO: 0 /100 WBCS (ref 0–0)
PLATELET # BLD AUTO: 278 X10*3/UL (ref 150–450)
POTASSIUM SERPL-SCNC: 4.6 MMOL/L (ref 3.5–5.3)
PROT SERPL-MCNC: 7.4 G/DL (ref 6.4–8.2)
RBC # BLD AUTO: 4.95 X10*6/UL (ref 4–5.2)
SODIUM SERPL-SCNC: 138 MMOL/L (ref 136–145)
TRIGL SERPL-MCNC: 107 MG/DL (ref 0–149)
TSH SERPL-ACNC: 1.59 MIU/L (ref 0.44–3.98)
VIT B12 SERPL-MCNC: 1050 PG/ML (ref 211–911)
VLDL: 21 MG/DL (ref 0–40)
WBC # BLD AUTO: 7.2 X10*3/UL (ref 4.4–11.3)

## 2023-12-15 PROCEDURE — 82043 UR ALBUMIN QUANTITATIVE: CPT

## 2023-12-15 PROCEDURE — 85025 COMPLETE CBC W/AUTO DIFF WBC: CPT

## 2023-12-15 PROCEDURE — 82570 ASSAY OF URINE CREATININE: CPT

## 2023-12-15 PROCEDURE — 80061 LIPID PANEL: CPT

## 2023-12-15 PROCEDURE — 36415 COLL VENOUS BLD VENIPUNCTURE: CPT

## 2023-12-15 PROCEDURE — 84443 ASSAY THYROID STIM HORMONE: CPT

## 2023-12-15 PROCEDURE — 83721 ASSAY OF BLOOD LIPOPROTEIN: CPT

## 2023-12-15 PROCEDURE — 82306 VITAMIN D 25 HYDROXY: CPT

## 2023-12-15 PROCEDURE — 82607 VITAMIN B-12: CPT

## 2023-12-15 PROCEDURE — 86803 HEPATITIS C AB TEST: CPT

## 2023-12-15 PROCEDURE — 80053 COMPREHEN METABOLIC PANEL: CPT

## 2023-12-29 NOTE — RESULT ENCOUNTER NOTE
Please let Muriel know her labs of 12-15-23 were stable and acceptable. Negative hepatitis C. Chemistries ok other than glucose of 171. Cholesterol panel and cbc , thyroid vitamin d all ok. Continue medications as discussed at her 12-12 appt.

## 2024-02-01 ENCOUNTER — HOSPITAL ENCOUNTER (OUTPATIENT)
Dept: RADIOLOGY | Facility: CLINIC | Age: 69
Discharge: HOME | End: 2024-02-01
Payer: MEDICARE

## 2024-02-01 DIAGNOSIS — Z12.31 VISIT FOR SCREENING MAMMOGRAM: ICD-10-CM

## 2024-02-01 PROCEDURE — 77067 SCR MAMMO BI INCL CAD: CPT

## 2024-02-01 PROCEDURE — 77067 SCR MAMMO BI INCL CAD: CPT | Performed by: STUDENT IN AN ORGANIZED HEALTH CARE EDUCATION/TRAINING PROGRAM

## 2024-02-01 PROCEDURE — 77063 BREAST TOMOSYNTHESIS BI: CPT | Performed by: STUDENT IN AN ORGANIZED HEALTH CARE EDUCATION/TRAINING PROGRAM

## 2024-02-05 ENCOUNTER — TELEPHONE (OUTPATIENT)
Dept: PRIMARY CARE | Facility: CLINIC | Age: 69
End: 2024-02-05
Payer: MEDICARE

## 2024-02-05 DIAGNOSIS — U07.1 COVID-19 VIRUS INFECTION: Primary | ICD-10-CM

## 2024-02-05 NOTE — TELEPHONE ENCOUNTER
Pt has called in and has tested positive for Covid 2/5/24. This was a home test. Symptoms started 2/4/24.   First time having covid? yes  Employed in Health Care? no  Traveled recently? no  Fever/chills/shaking? no  Sore throat/hoarseness? yes  Cough/sputum/color? Dry cough-non-productive  SOB/wheezing? no  Nasal congestion? yes  Headache? no  Loss of sense of smell/taste? no  Body aches/malaise? yes  D/N/V? No    Pt would like [facility] for Paxlovid and would like this if possible.

## 2024-03-14 ENCOUNTER — LAB (OUTPATIENT)
Dept: LAB | Facility: LAB | Age: 69
End: 2024-03-14
Payer: MEDICARE

## 2024-03-14 DIAGNOSIS — Z79.4 TYPE 2 DIABETES MELLITUS TREATED WITH INSULIN (MULTI): ICD-10-CM

## 2024-03-14 DIAGNOSIS — E11.9 TYPE 2 DIABETES MELLITUS TREATED WITH INSULIN (MULTI): ICD-10-CM

## 2024-03-14 LAB
EST. AVERAGE GLUCOSE BLD GHB EST-MCNC: 169 MG/DL
HBA1C MFR BLD: 7.5 %

## 2024-03-14 PROCEDURE — 83036 HEMOGLOBIN GLYCOSYLATED A1C: CPT

## 2024-03-14 PROCEDURE — 36415 COLL VENOUS BLD VENIPUNCTURE: CPT

## 2024-03-15 ASSESSMENT — ENCOUNTER SYMPTOMS
WEAKNESS: 0
WEIGHT LOSS: 0
SWEATS: 0
HUNGER: 0
SPEECH DIFFICULTY: 0
SEIZURES: 0
BLURRED VISION: 0
TREMORS: 0
NERVOUS/ANXIOUS: 0
FATIGUE: 0
HEADACHES: 0
CONFUSION: 0
DIZZINESS: 0
POLYDIPSIA: 0
BLACKOUTS: 0
VISUAL CHANGE: 0
POLYPHAGIA: 0

## 2024-03-17 NOTE — PROGRESS NOTES
"Subjective   Patient ID: Alexia Cho \"Corina" is a 68 y.o. female who presents for Follow-up (Patient is here for follow up visit regarding her lab work, she would also like some medication refills.).  LAST VISIT PLAN 12/12/23  Alexia was seen today for follow-up.  Diagnoses and all orders for this visit:  Primary hypertension (Primary)  -     Comprehensive Metabolic Panel; Future  -     Albumin , Urine Random; Future  Hyperlipidemia due to type 2 diabetes mellitus (CMS/HCC)  -     POCT glycosylated hemoglobin (Hb A1C) manually resulted  -     Lipid Panel; Future  -     Comprehensive Metabolic Panel; Future  -     TSH with reflex to Free T4 if abnormal; Future  -     Cholesterol, LDL Direct; Future  Need for hepatitis C screening test  -     Hepatitis C antibody; Future  Visit for screening mammogram  -     BI mammo bilateral screening tomosynthesis; Future  Medication dose changed  Low blood pressure reading  Type 2 diabetes mellitus with hyperglycemia, with long-term current use of insulin (CMS/[facility])  -     CBC and Auto Differential; Future  -     Comprehensive Metabolic Panel; Future  -     Albumin , Urine Random; Future  Vitamin B12 deficiency (non anemic)  -     CBC and Auto Differential; Future  -     Vitamin B12; Future  Encounter for monitoring diuretic therapy  -     Comprehensive Metabolic Panel; Future  Vitamin D deficiency  -     Vitamin D 25-Hydroxy,Total (for eval of Vitamin D levels); Future  Type 2 diabetes mellitus treated with insulin (CMS/[facility])  -     Trulicity 4.5 mg/0.5 mL pen injector; Inject 4.5 mg under the skin 1 (one) time per week.  -     Lantus U-100 Insulin 100 unit/mL injection; Inject 22 Units under the skin once daily at bedtime. Take as directed per insulin instructions.  -     FreeStyle Darren 14 Day Sensor kit; APPLY DEVICE TO POSTERIOR ARM AND LEAVE FOR 14 DAYS. USE REMOTE TO SCAN AND REVIEW SUGARS  -     BD Insulin Syringe Ultra-Fine 0.3 mL 31 gauge x 5/16\" syringe; use as " directed once daily  -     Hemoglobin A1c; Future  Encounter for diabetic foot exam (CMS/[facility])  Normal diab foot exam  Bp continues to run lower so will continue to cut back on metoprolol-  Counseled on diet  12 orders placed.change location of trulicity consider changing to other glp 1 if A1c not down  She was back to eating cinnamon life cereal and was taking farxiga after breakfast, modifying this behavior should help      See wrap up section for patient instructions and  additional treatment plan.     Take the Farxiga BEFORE breakfast.    Use the upper abdomen and mid abdomen  for Trulicity.    Decrease metoprolol from 150 mg to 100 mg once per day. Blood pressure is mildly low  and heart rate is not too fast. Check blood pressure In a week or 2 after doing this to be sure not elevated.    Stop eating cereal in the am. Only homemade steel cut oats with no sugar would be a cereal that is ok.  Stop the fast food. If culvers, no damaris slaw , no sauce and no rolls. Less of the fries. Cut fast food to once per week at the most.    Check sugars 2 hours after a fast food meal to see how it affect you.    Fasting blood test soon.  Instructions for obtaining lab tests:   You do not need a paper requisition when obtaining tests at a  facility. No appointment is needed for lab tests.  For fasting blood tests:  Please do not consume calories for 10-12 hours prior to this blood test. It is ok to drink water, you should be hydrated.  Please do not take vitamins, supplements or thyroid medication before your blood is drawn this day.   Most lab results should be available for your review on the  My Chart portal within 48 hours. Please contact the office if you have not received results within 2 weeks of obtaining the test.      If sugars are not coming down with the changes above, call or message as would consider increasing your insulin.  Follow up with  Dr Wasserman  in march. Do A1c at the lab March 12 or  a few days after   and see me later in the month.    Covid booster recommended.    Also recommend RSV vaccine.  END INFO FROM PRIOR VISIT    Scribe Transcription:  Cardiac: No CP , palpitations, increased luna  Resp: No sob, wheezing, cough  Extremities: No leg or ankle swelling, no claudication  Neuro: No dizziness, syncope, blurred vision. No new headaches.     Her avg glucose is 152 for 30 and 90 days but 148 for 7 days. Her time in target is 57%. Her time above target is 43%. 0% below target which was for the last 7 days. For the last 90 days she was above target 56% and within range 43%. Her target range is . Overall her diabetes is improving.   GLP-1 agonists (Trulicity, Victoza, Ozempic, Wegovy, Rybelsus, Bydureon): No nausea, vomiting, abdominal pain and anterior neck pain/swelling.   SGLT-2 inhibitors (Farxiga, Jardiance, Invokana): No hematuria, dysuria, or yeast infections.   Last visit we lowered her metoprolol dose from 200 to 150 mg to 100 mg. She also has a hx of systolic heart murmur but I didn’t hear it the past two visits. Her bp is still relatively okay.     We are doing blood work to monitor her hyperkalemia and sglt2 inhibitor use.    She was 7.8 in May, 8.1 in September, and 8.8 in December.    She is taking 22 units of insulin at bedtime.    Scribe Attestation  By signing my name below, I, Mu Arellano   attest that this documentation has been prepared under the direction and in the presence of Nat Wasserman MD.     Diabetes  She has type 2 diabetes mellitus. No MedicAlert identification noted. The initial diagnosis of diabetes was made 12 years ago. Pertinent negatives for hypoglycemia include no confusion, dizziness, headaches, hunger, mood changes, nervousness/anxiousness, pallor, seizures, sleepiness, speech difficulty, sweats or tremors. Pertinent negatives for diabetes include no blurred vision, no chest pain, no fatigue, no foot paresthesias, no foot ulcerations, no polydipsia, no  polyphagia, no polyuria, no visual change, no weakness and no weight loss. Pertinent negatives for hypoglycemia complications include no blackouts, no hospitalization, no nocturnal hypoglycemia, no required assistance and no required glucagon injection. Pertinent negatives for diabetic complications include no CVA, heart disease, impotence, nephropathy, peripheral neuropathy, PVD or retinopathy. Risk factors for coronary artery disease include dyslipidemia, hypertension and obesity. Current diabetic treatment includes insulin injections and oral agent (dual therapy). She is compliant with treatment most of the time. She is currently taking insulin at bedtime. Insulin injections are given by patient. Rotation sites for injection include the abdominal wall. Her weight is stable. She is following a generally healthy diet. Meal planning includes avoidance of concentrated sweets and carbohydrate counting. She has not had a previous visit with a dietitian. She participates in exercise three times a week. She monitors blood glucose at home 1-2 x per day. She monitors urine at home <1 x per month. Blood glucose monitoring compliance is fair. Her home blood glucose trend is fluctuating minimally. Her breakfast blood glucose is taken between 9-10 am. Her breakfast blood glucose range is generally  mg/dl. Her lunch blood glucose is taken between 12-1 pm. Her lunch blood glucose range is generally  mg/dl. Her dinner blood glucose is taken between 5-6 pm. Her dinner blood glucose range is generally 110-130 mg/dl. Her overall blood glucose range is 140-180 mg/dl. She does not see a podiatrist.Eye exam is current.       Here to follow up on htn, dm2, meds, hld.  Hx anxiety and depression managed by psychiatry.this is doing well per pt.  No falls, and phq2 zero today.  Review of Systems   Constitutional:  Negative for fatigue and weight loss.   Eyes:  Negative for blurred vision.   Cardiovascular:  Negative for chest  "pain.   Endocrine: Negative for polydipsia, polyphagia and polyuria.   Genitourinary:  Negative for impotence.   Skin:  Negative for pallor.   Neurological:  Negative for dizziness, tremors, seizures, speech difficulty, weakness and headaches.   Psychiatric/Behavioral:  Negative for confusion. The patient is not nervous/anxious.      See ROS in HPI    All questions about glp 1 and sglt2i nega and no hypoglycemia  Current Outpatient Medications   Medication Instructions    amLODIPine (NORVASC) 10 mg, oral, Daily    amoxicillin (AMOXIL) 2,000 mg, oral, As needed    atorvastatin (Lipitor) 20 mg tablet 1 tablet, oral, Nightly    BD Insulin Syringe Ultra-Fine 0.3 mL 31 gauge x 5/16\" syringe use as directed once daily    cholecalciferol (Vitamin D-3) 50 mcg (2,000 unit) capsule oral    cyanocobalamin (Vitamin B-12) 1,000 mcg tablet 1 tablet, oral, Daily    DULoxetine (Cymbalta) 60 mg DR capsule oral    estradiol (Estrace) 0.01 % (0.1 mg/gram) vaginal cream INSERT 1/4TH GRAM IN VAGINA DAILY OR AS DIRECTED AND CAN RUB SMALL AMOUNT OF CREAM EXTERNALLY    Farxiga 10 mg TAKE 1 TABLET EVERY MORNINGBEFORE A MEAL.    FreeStyle Lancets 28 gauge use 1 LANCET to TEST BLOOD SUGAR twice a day    FreeStyle Darren 14 Day Sensor kit APPLY DEVICE TO POSTERIOR ARM AND LEAVE FOR 14 DAYS. USE REMOTE TO SCAN AND REVIEW SUGARS    FreeStyle Lite Strips strip use 1 TEST STRIP to TEST BLOOD SUGAR once daily    hydroCHLOROthiazide (HYDRODiuril) 25 mg tablet TAKE ONE-HALF (1/2) TABLET DAILY (NEW DOSE)    Lactobacillus rhamnosus GG (CULTURELLE) 15 billion cell capsule, sprinkle capsule oral    Lantus U-100 Insulin 22 Units, subcutaneous, Nightly, Take as directed per insulin instructions.    metoprolol succinate XL (TOPROL XL) 100 mg, oral, Daily, Do not crush or chew.    mirtazapine (Remeron) 30 mg tablet 1 tablet, oral, Nightly    psyllium (Metamucil, sugar,) powder oral, Daily RT    ramipril (ALTACE) 10 mg, oral, Every 12 hours    spironolactone " "(ALDACTONE) 25 mg, oral, Daily    Trulicity 4.5 mg, subcutaneous, Weekly     Allergies   Allergen Reactions    Ciprofloxacin Hives    Diclofenac Sodium Unknown    Erythromycin Base Diarrhea    Naproxen Diarrhea     Objective   Lab Results   Component Value Date    WBC 7.2 12/15/2023    HGB 15.8 12/15/2023    HCT 46.3 (H) 12/15/2023     12/15/2023    CHOL 133 12/15/2023    TRIG 107 12/15/2023    HDL 34.1 12/15/2023    LDLDIRECT 88 12/15/2023    ALT 27 12/15/2023    AST 23 12/15/2023     12/15/2023    K 4.6 12/15/2023     12/15/2023    CREATININE 0.84 12/15/2023    BUN 20 12/15/2023    CO2 28 12/15/2023    TSH 1.59 12/15/2023    HGBA1C 7.5 (H) 03/14/2024     Diabetes:Type II:Is taking medications regularly, denies side effects.  Denies hypoglycemia, polyuria, polydipsia.  No new numbness or paresthesias of extremities.No syncope or dizziness.No blurred vision.  Lab Results   Component Value Date    HGBA1C 7.5 (H) 03/14/2024    HGBA1C 8.8 (A) 12/12/2023    HGBA1C 8.1 (A) 09/05/2023     Lab Results   Component Value Date    LDLCALC 78 12/15/2023    CREATININE 0.84 12/15/2023         Lab Results   Component Value Date    GLUCOSE 171 (H) 12/15/2023    CALCIUM 10.0 12/15/2023     12/15/2023    K 4.6 12/15/2023    CO2 28 12/15/2023     12/15/2023    BUN 20 12/15/2023    CREATININE 0.84 12/15/2023            Diabetic Medications:   Insulin long acting 22 units at bedtime.    Farxiga 10 and trulicity 4.5    Physical ExamBP 109/70   Pulse 80   Ht 1.702 m (5' 7\")   Wt 78 kg (172 lb)   BMI 26.94 kg/m²       1/31/2023     9:43 AM 5/9/2023    11:27 AM 5/9/2023    12:41 PM 9/5/2023     9:08 AM 9/5/2023     9:57 AM 12/12/2023     9:49 AM 3/18/2024    11:56 AM   Vitals   Systolic 106 102 110 98 104 96 109   Diastolic 62 54 70 54 60 52 70   Heart Rate 64 64  72 72 64 80   Resp 18 18  18  18    Height (in)  1.676 m (5' 6\")  1.689 m (5' 6.5\")  1.702 m (5' 7\") 1.702 m (5' 7\")   Weight (lb) 170 170  170  " 174 172   BMI 26.63 kg/m2 27.44 kg/m2  27.03 kg/m2  27.25 kg/m2 26.94 kg/m2   BSA (m2) 1.91 m2 1.89 m2  1.9 m2  1.93 m2 1.92 m2   Visit Report  Report Report Report Report Report Report       Patient looks well and is in no obvious distress.  HEENT:   Normocephalic, no facial asymmetry  Eyes: Sclera and conjunctiva are clear.  Neck: No adenopathy cervical (Ant/post/lat), no Supraclavicular nodes.   Thyroid goiter same and no palpable defined nodules.   Carotid pulses normal, no carotid bruits.  Lungs : RR normal. Clear to auscultation anterior, posterior and lateral. No rales, wheezes rhonchi or rubs. Good air exchange.  Heart: RRR. 1/6 systolic Murmur ursb ulsb and no gallop, click or rub.  Abdomen: Bowel sounds normal, No bruits. No pulsatile mass. No hepatosplenomegaly, masses or tenderness. Soft, no guarding.  Vascular:  Posterior tibialis  pulses within normal limits bilaterally.   Extremities: no upper extremity edema. No lower extremity edema.   Musculoskeletal: no synovitis of joints seen. No new deformity.  Neuro: CN 2-12 intact. Alert, appropriate.   Ambulates independently.  No gross motor deficit.   No tremors.  Psych: normal mood and affect.  Skin: No rash, bruising petechiae or jaundice.      Assessment/Plan   Problem List Items Addressed This Visit       Aortic valve sclerosis    Relevant Medications    metoprolol succinate XL (Toprol XL) 100 mg 24 hr tablet    amLODIPine (Norvasc) 10 mg tablet    Hyperlipidemia due to type 2 diabetes mellitus (CMS/HCC)    Hypertension    Relevant Medications    metoprolol succinate XL (Toprol XL) 100 mg 24 hr tablet    spironolactone (Aldactone) 25 mg tablet    amLODIPine (Norvasc) 10 mg tablet    Other Relevant Orders    Comprehensive metabolic panel    Hypokalemia    Relevant Orders    Comprehensive metabolic panel     Other Visit Diagnoses       Type 2 diabetes mellitus treated with insulin (CMS/[facility])    -  Primary    Relevant Medications    Lantus U-100 Insulin  100 unit/mL injection    Other Relevant Orders    Comprehensive metabolic panel    Diabetes mellitus type 2 in nonobese (CMS/[facility])        Medication monitoring encounter        Relevant Orders    Comprehensive metabolic panel    Encounter for monitoring statin therapy        Relevant Orders    CK    Systolic murmur        stable, due to av sclerosis          See wrap up section for patient instructions and  additional treatment plan.   Patient Instructions:  Blood test the week before your annual in may, does not have to be fasting.    Keep meds the same.  Start exercising, and if sugars drop with this cut insulin back by 2 units, then reassess and if still an issue cut back by 2 units more.  Call if problems.  Follow up at annual, sooner if needed.  ---------------  I am the primary care physician for this patient's ongoing medical care, which is managed during and in between office visits.

## 2024-03-18 ENCOUNTER — OFFICE VISIT (OUTPATIENT)
Dept: PRIMARY CARE | Facility: CLINIC | Age: 69
End: 2024-03-18
Payer: MEDICARE

## 2024-03-18 VITALS
SYSTOLIC BLOOD PRESSURE: 109 MMHG | DIASTOLIC BLOOD PRESSURE: 70 MMHG | HEART RATE: 80 BPM | BODY MASS INDEX: 27 KG/M2 | HEIGHT: 67 IN | WEIGHT: 172 LBS

## 2024-03-18 DIAGNOSIS — Z79.899 ENCOUNTER FOR MONITORING STATIN THERAPY: ICD-10-CM

## 2024-03-18 DIAGNOSIS — R01.1 SYSTOLIC MURMUR: ICD-10-CM

## 2024-03-18 DIAGNOSIS — E78.5 HYPERLIPIDEMIA DUE TO TYPE 2 DIABETES MELLITUS (MULTI): ICD-10-CM

## 2024-03-18 DIAGNOSIS — E11.9 DIABETES MELLITUS TYPE 2 IN NONOBESE (MULTI): ICD-10-CM

## 2024-03-18 DIAGNOSIS — E11.69 HYPERLIPIDEMIA DUE TO TYPE 2 DIABETES MELLITUS (MULTI): ICD-10-CM

## 2024-03-18 DIAGNOSIS — Z51.81 MEDICATION MONITORING ENCOUNTER: ICD-10-CM

## 2024-03-18 DIAGNOSIS — Z79.4 TYPE 2 DIABETES MELLITUS TREATED WITH INSULIN (MULTI): Primary | ICD-10-CM

## 2024-03-18 DIAGNOSIS — E11.9 TYPE 2 DIABETES MELLITUS TREATED WITH INSULIN (MULTI): Primary | ICD-10-CM

## 2024-03-18 DIAGNOSIS — I35.8 AORTIC VALVE SCLEROSIS: ICD-10-CM

## 2024-03-18 DIAGNOSIS — I10 HYPERTENSION, UNSPECIFIED TYPE: ICD-10-CM

## 2024-03-18 DIAGNOSIS — E87.6 HYPOKALEMIA: ICD-10-CM

## 2024-03-18 DIAGNOSIS — Z51.81 ENCOUNTER FOR MONITORING STATIN THERAPY: ICD-10-CM

## 2024-03-18 PROCEDURE — 1126F AMNT PAIN NOTED NONE PRSNT: CPT | Performed by: INTERNAL MEDICINE

## 2024-03-18 PROCEDURE — 1159F MED LIST DOCD IN RCRD: CPT | Performed by: INTERNAL MEDICINE

## 2024-03-18 PROCEDURE — 3074F SYST BP LT 130 MM HG: CPT | Performed by: INTERNAL MEDICINE

## 2024-03-18 PROCEDURE — 99215 OFFICE O/P EST HI 40 MIN: CPT | Performed by: INTERNAL MEDICINE

## 2024-03-18 PROCEDURE — 1160F RVW MEDS BY RX/DR IN RCRD: CPT | Performed by: INTERNAL MEDICINE

## 2024-03-18 PROCEDURE — G2211 COMPLEX E/M VISIT ADD ON: HCPCS | Performed by: INTERNAL MEDICINE

## 2024-03-18 PROCEDURE — 1157F ADVNC CARE PLAN IN RCRD: CPT | Performed by: INTERNAL MEDICINE

## 2024-03-18 PROCEDURE — 4010F ACE/ARB THERAPY RXD/TAKEN: CPT | Performed by: INTERNAL MEDICINE

## 2024-03-18 PROCEDURE — 3078F DIAST BP <80 MM HG: CPT | Performed by: INTERNAL MEDICINE

## 2024-03-18 PROCEDURE — 3051F HG A1C>EQUAL 7.0%<8.0%: CPT | Performed by: INTERNAL MEDICINE

## 2024-03-18 RX ORDER — METOPROLOL SUCCINATE 100 MG/1
100 TABLET, EXTENDED RELEASE ORAL DAILY
Qty: 100 TABLET | Refills: 3 | Status: SHIPPED | OUTPATIENT
Start: 2024-03-18

## 2024-03-18 RX ORDER — INSULIN GLARGINE 100 [IU]/ML
22 INJECTION, SOLUTION SUBCUTANEOUS NIGHTLY
Qty: 30 ML | Refills: 3 | Status: SHIPPED | OUTPATIENT
Start: 2024-03-18 | End: 2024-05-15 | Stop reason: SDUPTHER

## 2024-03-18 RX ORDER — AMLODIPINE BESYLATE 10 MG/1
10 TABLET ORAL DAILY
Qty: 100 TABLET | Refills: 3 | Status: SHIPPED | OUTPATIENT
Start: 2024-03-18 | End: 2024-05-15 | Stop reason: SDUPTHER

## 2024-03-18 RX ORDER — SPIRONOLACTONE 25 MG/1
25 TABLET ORAL DAILY
Qty: 100 TABLET | Refills: 3 | Status: SHIPPED | OUTPATIENT
Start: 2024-03-18

## 2024-03-18 RX ORDER — DAPAGLIFLOZIN 10 MG/1
TABLET, FILM COATED ORAL
Qty: 100 TABLET | Refills: 3 | Status: SHIPPED | OUTPATIENT
Start: 2024-03-18 | End: 2024-03-20

## 2024-03-18 ASSESSMENT — PATIENT HEALTH QUESTIONNAIRE - PHQ9
1. LITTLE INTEREST OR PLEASURE IN DOING THINGS: NOT AT ALL
SUM OF ALL RESPONSES TO PHQ9 QUESTIONS 1 AND 2: 0
2. FEELING DOWN, DEPRESSED OR HOPELESS: NOT AT ALL

## 2024-03-18 ASSESSMENT — ENCOUNTER SYMPTOMS
SEIZURES: 0
BLURRED VISION: 0
HUNGER: 0
HEADACHES: 0
POLYPHAGIA: 0
POLYDIPSIA: 0
DIZZINESS: 0
CONFUSION: 0
VISUAL CHANGE: 0
BLACKOUTS: 0
TREMORS: 0
WEIGHT LOSS: 0
FATIGUE: 0
SWEATS: 0
WEAKNESS: 0
NERVOUS/ANXIOUS: 0
SPEECH DIFFICULTY: 0

## 2024-03-18 ASSESSMENT — PAIN SCALES - GENERAL: PAINLEVEL: 0-NO PAIN

## 2024-03-18 NOTE — PATIENT INSTRUCTIONS
Blood test the week before your annual in may, does not have to be fasting.    Keep meds the same.  Start exercising, and if sugars drop with this cut insulin back by 2 units, then reassess and if still an issue cut back by 2 units more.  Call if problems.  Follow up at annual, sooner if needed.

## 2024-04-24 DIAGNOSIS — Z79.4 TYPE 2 DIABETES MELLITUS TREATED WITH INSULIN (MULTI): ICD-10-CM

## 2024-04-24 DIAGNOSIS — E11.9 TYPE 2 DIABETES MELLITUS TREATED WITH INSULIN (MULTI): ICD-10-CM

## 2024-04-24 RX ORDER — DULAGLUTIDE 4.5 MG/.5ML
4.5 INJECTION, SOLUTION SUBCUTANEOUS
Qty: 4 PEN | Refills: 10 | Status: SHIPPED | OUTPATIENT
Start: 2024-04-28

## 2024-04-25 PROCEDURE — RXMED WILLOW AMBULATORY MEDICATION CHARGE

## 2024-04-26 ENCOUNTER — TELEMEDICINE (OUTPATIENT)
Dept: BEHAVIORAL HEALTH | Facility: CLINIC | Age: 69
End: 2024-04-26
Payer: MEDICARE

## 2024-04-26 ENCOUNTER — PHARMACY VISIT (OUTPATIENT)
Dept: PHARMACY | Facility: CLINIC | Age: 69
End: 2024-04-26
Payer: MEDICARE

## 2024-04-26 DIAGNOSIS — F41.9 ANXIETY AND DEPRESSION: ICD-10-CM

## 2024-04-26 DIAGNOSIS — F32.A ANXIETY AND DEPRESSION: ICD-10-CM

## 2024-04-26 PROCEDURE — 3051F HG A1C>EQUAL 7.0%<8.0%: CPT | Performed by: PSYCHIATRY & NEUROLOGY

## 2024-04-26 PROCEDURE — 4010F ACE/ARB THERAPY RXD/TAKEN: CPT | Performed by: PSYCHIATRY & NEUROLOGY

## 2024-04-26 PROCEDURE — 1159F MED LIST DOCD IN RCRD: CPT | Performed by: PSYCHIATRY & NEUROLOGY

## 2024-04-26 PROCEDURE — 1157F ADVNC CARE PLAN IN RCRD: CPT | Performed by: PSYCHIATRY & NEUROLOGY

## 2024-04-26 PROCEDURE — 99214 OFFICE O/P EST MOD 30 MIN: CPT | Performed by: PSYCHIATRY & NEUROLOGY

## 2024-04-26 PROCEDURE — 1160F RVW MEDS BY RX/DR IN RCRD: CPT | Performed by: PSYCHIATRY & NEUROLOGY

## 2024-04-26 RX ORDER — MIRTAZAPINE 30 MG/1
30 TABLET, FILM COATED ORAL NIGHTLY
Qty: 90 TABLET | Refills: 1 | Status: SHIPPED | OUTPATIENT
Start: 2024-04-26 | End: 2024-10-23

## 2024-04-26 RX ORDER — DULOXETIN HYDROCHLORIDE 60 MG/1
60 CAPSULE, DELAYED RELEASE ORAL 2 TIMES DAILY
Qty: 180 CAPSULE | Refills: 1 | Status: SHIPPED | OUTPATIENT
Start: 2024-04-26 | End: 2024-10-23

## 2024-04-26 SDOH — ECONOMIC STABILITY: GENERAL
WHICH OF THE FOLLOWING WOULD YOU LIKE TO GET CONNECTED TO IN ORDER TO RECEIVE A DISCOUNT OR FOR FREE? (CHOOSE ALL THAT APPLY): NONE OF THESE

## 2024-04-26 SDOH — ECONOMIC STABILITY: GENERAL
WHICH OF THE FOLLOWING DO YOU KNOW HOW TO USE AND HAVE ACCESS TO EVERY DAY? (CHOOSE ALL THAT APPLY): DESKTOP COMPUTER, LAPTOP COMPUTER, OR TABLET WITH BROADBAND INTERNET CONNECTION;SMARTPHONE WITH CELLULAR DATA PLAN

## 2024-04-26 ASSESSMENT — SOCIAL DETERMINANTS OF HEALTH (SDOH): IN THE PAST 12 MONTHS, HAS THE ELECTRIC, GAS, OIL, OR WATER COMPANY THREATENED TO SHUT OFF SERVICE IN YOUR HOME?: NO

## 2024-04-26 NOTE — PROGRESS NOTES
"Subjective   Patient ID: Alexia Cho \"Corina" is a 68 y.o. female who presents for No chief complaint on file..    The patient engaged in a telehealth session via Epic audio visual or phone with this provider practicing within the [facility]. The identity of the patient was verified by their date of birth and last four digits of their social security number. The provider demonstrated that confidentially was preserved at their location. The patient was informed that they were responsible for ensuring confidentially was secured at their location. The patient's location was documented for emergency purposes. The patient was informed of the necessary steps that would occur if an emergency was to occur or technology failed during session.     HPI: I am doing much better. The patient reports that her family has been doing better.     MSE: The patient is alert, fully oriented, language is intact, and recent and remote memory intact. The patient denies any suicidal or homicidal ideation or plans. The patient presents with no depressive, manic or psychotic symptoms. Thought is logical and clear. No disturbances of judgment or insight are exhibited. No behavioral disturbances are present on examination.        Review of Systems   Neurological:         MSE: The patient is alert, fully oriented, language is intact, and recent and remote memory intact. The patient denies any suicidal or homicidal ideation or plans. The patient presents with no depressive, manic or psychotic symptoms. Thought is logical and clear. No disturbances of judgment or insight are exhibited. No behavioral disturbances are present on examination.     Psychiatric/Behavioral:          MSE: The patient is alert, fully oriented, language is intact, and recent and remote memory intact. The patient denies any suicidal or homicidal ideation or plans. The patient presents with no depressive, manic or psychotic symptoms. Thought is logical and clear. No " disturbances of judgment or insight are exhibited. No behavioral disturbances are present on examination.     All other systems reviewed and are negative.    Psych Review of Symptoms:    ADHD: Patient denied any symptoms.         Anxiety: Patient denied any symptoms.         Developmental and Sensory Concerns: Patient denied any symptoms.         Depressive Symptoms: Patient denied any symptoms.         Disruptive and Conduct Symptoms: Patient denied any symptoms.         Eating / Feeding Concerns: Patient denied any symptoms.         Elimination Symptoms: Patient denied any symptoms.         Manic Symptoms: Patient denied any symptoms.         Obsessive-Compulsive Symptoms: Patient denied any symptoms.         Psychotic Symptoms: Patient denied any symptoms.           Trauma Related Symptoms: Patient denied any symptoms.           Sleep Concerns: Patient denied any symptoms.             Objective   Physical Exam  Constitutional:       Appearance: Normal appearance.   Neurological:      General: No focal deficit present.      Mental Status: She is alert and oriented to person, place, and time. Mental status is at baseline.      Comments: MSE: The patient is alert, fully oriented, language is intact, and recent and remote memory intact. The patient denies any suicidal or homicidal ideation or plans. The patient presents with no depressive, manic or psychotic symptoms. Thought is logical and clear. No disturbances of judgment or insight are exhibited. No behavioral disturbances are present on examination.     Psychiatric:         Mood and Affect: Mood normal.         Behavior: Behavior normal.         Thought Content: Thought content normal.         Judgment: Judgment normal.      Comments: MSE: The patient is alert, fully oriented, language is intact, and recent and remote memory intact. The patient denies any suicidal or homicidal ideation or plans. The patient presents with no depressive, manic or psychotic  symptoms. Thought is logical and clear. No disturbances of judgment or insight are exhibited. No behavioral disturbances are present on examination.           Lab Review:       Assessment/Plan   The FDA risks, benefits & alternatives to the medications prescribed were explained to you today. You were able to understand & repeat these risks, benefits & alternatives to these prescribed medications. You have agreed to proceed with treatment with the medications discussed based on the conclusion that the benefit outweighs the risks of this treatment regimen: continue duloxetine 60 mg twice daily and mirtazapine 30 mg nightly.     Follow up in October of 2024.

## 2024-05-10 ENCOUNTER — LAB (OUTPATIENT)
Dept: LAB | Facility: LAB | Age: 69
End: 2024-05-10
Payer: MEDICARE

## 2024-05-10 DIAGNOSIS — Z79.4 TYPE 2 DIABETES MELLITUS TREATED WITH INSULIN (MULTI): ICD-10-CM

## 2024-05-10 DIAGNOSIS — E87.6 HYPOKALEMIA: ICD-10-CM

## 2024-05-10 DIAGNOSIS — E11.9 TYPE 2 DIABETES MELLITUS TREATED WITH INSULIN (MULTI): ICD-10-CM

## 2024-05-10 DIAGNOSIS — Z51.81 MEDICATION MONITORING ENCOUNTER: ICD-10-CM

## 2024-05-10 DIAGNOSIS — Z51.81 ENCOUNTER FOR MONITORING STATIN THERAPY: ICD-10-CM

## 2024-05-10 DIAGNOSIS — Z79.899 ENCOUNTER FOR MONITORING STATIN THERAPY: ICD-10-CM

## 2024-05-10 DIAGNOSIS — I10 HYPERTENSION, UNSPECIFIED TYPE: ICD-10-CM

## 2024-05-10 LAB
ALBUMIN SERPL BCP-MCNC: 4.2 G/DL (ref 3.4–5)
ALP SERPL-CCNC: 65 U/L (ref 33–136)
ALT SERPL W P-5'-P-CCNC: 29 U/L (ref 7–45)
ANION GAP SERPL CALC-SCNC: 14 MMOL/L (ref 10–20)
AST SERPL W P-5'-P-CCNC: 20 U/L (ref 9–39)
BILIRUB SERPL-MCNC: 0.6 MG/DL (ref 0–1.2)
BUN SERPL-MCNC: 20 MG/DL (ref 6–23)
CALCIUM SERPL-MCNC: 9.8 MG/DL (ref 8.6–10.6)
CHLORIDE SERPL-SCNC: 101 MMOL/L (ref 98–107)
CK SERPL-CCNC: 178 U/L (ref 0–215)
CO2 SERPL-SCNC: 29 MMOL/L (ref 21–32)
CREAT SERPL-MCNC: 0.82 MG/DL (ref 0.5–1.05)
EGFRCR SERPLBLD CKD-EPI 2021: 77 ML/MIN/1.73M*2
GLUCOSE SERPL-MCNC: 150 MG/DL (ref 74–99)
POTASSIUM SERPL-SCNC: 4.5 MMOL/L (ref 3.5–5.3)
PROT SERPL-MCNC: 7.1 G/DL (ref 6.4–8.2)
SODIUM SERPL-SCNC: 139 MMOL/L (ref 136–145)

## 2024-05-10 PROCEDURE — 82550 ASSAY OF CK (CPK): CPT

## 2024-05-10 PROCEDURE — 80053 COMPREHEN METABOLIC PANEL: CPT

## 2024-05-10 PROCEDURE — 36415 COLL VENOUS BLD VENIPUNCTURE: CPT

## 2024-05-14 NOTE — PROGRESS NOTES
Subjective   Reason for Visit: Alexia Cho is an 69 y.o. female here for a Medicare Wellness visit. And annual physical    LAST VISIT PLAN 3/18/24  Assessment/Plan   Problem List Items Addressed This Visit       Aortic valve sclerosis    Relevant Medications    metoprolol succinate XL (Toprol XL) 100 mg 24 hr tablet    amLODIPine (Norvasc) 10 mg tablet    Hyperlipidemia due to type 2 diabetes mellitus (CMS/[facility])    Hypertension    Relevant Medications    metoprolol succinate XL (Toprol XL) 100 mg 24 hr tablet    spironolactone (Aldactone) 25 mg tablet    amLODIPine (Norvasc) 10 mg tablet    Other Relevant Orders    Comprehensive metabolic panel    Hypokalemia    Relevant Orders    Comprehensive metabolic panel     Other Visit Diagnoses       Type 2 diabetes mellitus treated with insulin (CMS/[facility])    -  Primary    Relevant Medications    Lantus U-100 Insulin 100 unit/mL injection    Other Relevant Orders    Comprehensive metabolic panel    Diabetes mellitus type 2 in nonobese (CMS/[facility])        Medication monitoring encounter        Relevant Orders    Comprehensive metabolic panel    Encounter for monitoring statin therapy        Relevant Orders    CK    Systolic murmur        stable, due to av sclerosis          See wrap up section for patient instructions and  additional treatment plan.   Patient Instructions:  Blood test the week before your annual in may, does not have to be fasting.    Keep meds the same.  Start exercising, and if sugars drop with this cut insulin back by 2 units, then reassess and if still an issue cut back by 2 units more.  Call if problems.  Follow up at annual, sooner if needed.  ---------------  I am the primary care physician for this patient's ongoing medical care, which is managed during and in between office visits.  END INFO FROM PRIOR VISIT   Past Medical, Surgical, and Family History reviewed and updated in chart.HPI    Patient is here for a subsequent annual wellness visit, annual  physical with pelvic  as well as follow up on/management of  medical problems.    Current and Past Medical History, Surgical and Family History reviewed and updated in chart.  Medicare wellness visit questions reviewed, as well as advanced directives, depression, alcohol screening, fall risk.     Reviewed medications from this prescribing practitioner, outside practitioners, clinical pharmacist and patient directed including  OTCs, herbal therapies and supplements, as well as prescriptions. These are documented in the medical record.    New issue: she Cannot bend left finger.  Since December no other complaints.    She has dm2, htn hld, anxiety and depression.   The patient has been using topical external genitalia estrogen and has not experienced any bleeding.     DM2:The patient's A1C level is 8.4, which is higher than the 7.5 it was 2 months ago. The patient's fasting sugar was 150, which is high. The patient was off Trulicity for a month, which could explain the high A1C level. The patient's average glucose levels have been high, but have been decreasing since restarting Trulicity. The patient also takes Lantus for diabetes. She is intolerant of metformin.The patient has been advised to exercise more to help with blood sugar levels, as well as follow appropriate diet. Average Blood Sugar Level is 150.  Was without trulicity for a month and now is back on schedule-she restarted the 4.5 after a joel sonia and no ill effects  GLP-1 agonists (Trulicity, Victoza, Ozempic, Wegovy, Rybelsus, Bydureon): No nausea, vomiting, abdominal pain and anterior neck pain/swelling.    Lantus is 22 units at night.--will increase to 24 units.    CGM data free rené;e    Time In target much better since back on trulicity. Had 3rd dose yesterday since off for 4 weeks. Cgm c/w a1  8.4    Anx/depression:The patient has been seeing Doctor Dines for anxiety and depression, which are currently under control on medication, she is compliant  with meds and her appts.    Skin ca screen: The patient had a skin check with Doctor Margot in December, which was normal. Health maintenance items last completed:  Colonoscopy: The patient had a colonoscopy 4 years ago and is due for another one next year.2/2020- polyps 2 sessile hyperplastic in the rectum. She thought told 10 years? Seems it should be 5-7 years.  Mammogram: 2/1/24 cat 1 neg  Bone Density: 4/2021- neg  Urine albumin if HTN or DM2: normal 2023 and last year ua normla other than approp glucose  Pap: 2023- neg   Pelvic: saw urogyn in sept<2020 for caruncle and atrophic and topical estrogen recommended -was to followup in 2 months but di dnot, no c/o. No bleeding. She is using vag estrogen twice per week with applicator.  Breast exam in office: today.  Vaccines due or not completed: Shringrix, covid, rsv  Last Health Maintenance exam: 5/9/23  Discussed vaccine recommendations-do recommend she get rsv early fall this year. Recommend shingrix. Reviewed covid booster recommendations, her last was dec 2022.    Family History: update  Mother had bladder cancer  Son has type 2 diabetes      Patient Care Team:  Nat Wasserman MD as PCP - General  Nat Wasserman MD as PCP - [employer]age PCP  Jeramy Avelar MD PhD as Psychiatrist (Geriatric Psychiatry)  MANOHAR Murrieta-CNP as Nurse Practitioner (Sleep Medicine)  Didier Terrell MD PhD as Physician Assistant (Obstetrics and Gynecology)  Elva VALDOVINOS MD as Surgeon (Gastroenterology)     Review of Systems  All systems reviewed and are negative unless otherwise stated in HPI or noted in writing on the written   3  page history and review of systems document reviewed by me and  scanned in with this visit. This is scanned either to notes or to media, with today's date.all are negative.    Depression and anxiety are good and dr avelar recent visit did not change anything. Sees him again in 6 months  Skin check dr vidal dec  "2023  Objective   Lab Results   Component Value Date    WBC 7.2 12/15/2023    HGB 15.8 12/15/2023    HCT 46.3 (H) 12/15/2023     12/15/2023    CHOL 133 12/15/2023    TRIG 107 12/15/2023    HDL 34.1 12/15/2023    LDLDIRECT 88 12/15/2023    ALT 29 05/10/2024    AST 20 05/10/2024     05/10/2024    K 4.5 05/10/2024     05/10/2024    CREATININE 0.82 05/10/2024    BUN 20 05/10/2024    CO2 29 05/10/2024    TSH 1.59 12/15/2023    HGBA1C 8.4 (A) 05/15/2024       Vitals:  /52 (BP Location: Right arm, Patient Position: Sitting, BP Cuff Size: Adult)   Pulse 60   Resp 18   Ht 1.676 m (5' 6\")   Wt 80.7 kg (178 lb)   BMI 28.73 kg/m²       Physical Exam  Vitals and nursing note reviewed.   Constitutional:       General: She is not in acute distress.     Appearance: Normal appearance. She is not ill-appearing.   HENT:      Head: Normocephalic.      Right Ear: Tympanic membrane, ear canal and external ear normal.      Left Ear: Tympanic membrane, ear canal and external ear normal.      Nose: No congestion.      Mouth/Throat:      Mouth: Mucous membranes are moist.      Pharynx: Oropharynx is clear. No oropharyngeal exudate or posterior oropharyngeal erythema.   Eyes:      General: No scleral icterus.     Extraocular Movements: Extraocular movements intact.      Conjunctiva/sclera: Conjunctivae normal.      Pupils: Pupils are equal, round, and reactive to light.   Neck:      Thyroid: Thyromegaly present. No thyroid mass or thyroid tenderness.      Vascular: No carotid bruit.      Trachea: Phonation normal.   Cardiovascular:      Rate and Rhythm: Normal rate and regular rhythm.      Pulses:           Carotid pulses are 2+ on the right side and 2+ on the left side.       Radial pulses are 2+ on the right side and 2+ on the left side.        Femoral pulses are 2+ on the right side and 2+ on the left side.       Dorsalis pedis pulses are 1+ on the right side and 1+ on the left side.        Posterior tibial " pulses are 1+ on the right side and 1+ on the left side.      Heart sounds: Normal heart sounds. No murmur heard.     No friction rub. No gallop.      Comments: RRR. Murmurs: None.-have heard in past but not today or last visit. Bp is excellent today.  No Gallops, Clicks or Rubs.  Pulmonary:      Effort: Pulmonary effort is normal. No respiratory distress.      Breath sounds: Normal breath sounds. No wheezing, rhonchi or rales.      Comments: No rales wheezes rhonchi or rubs. Good air exchange. Examined A/P/Lateral.  Abdominal:      General: Abdomen is flat. Bowel sounds are normal.      Palpations: Abdomen is soft.      Comments: No bruits. No masses. No pulsatile mass. No hepatosplenomegaly. No tenderness.    Musculoskeletal:      Cervical back: Neck supple.      Comments: Extremities:  No upper extremity edema.  No lower extremity edema.  No gross deformities.  Faded TKR scars noted bilateral knees.   Lymphadenopathy:      Cervical: No cervical adenopathy.   Skin:     General: Skin is warm and dry.      Coloration: Skin is not jaundiced.      Findings: No bruising, lesion or rash.   Neurological:      General: No focal deficit present.      Mental Status: She is alert and oriented to person, place, and time. Mental status is at baseline.      Cranial Nerves: No cranial nerve deficit.      Gait: Gait normal.      Comments: Alert, appropriate. Cranial Nerves 2-12 are normal and intact.  Ambulation normal. No assistance devices.  Able to get up and down off the exam table without assistance.   Psychiatric:         Mood and Affect: Mood normal.         Behavior: Behavior normal.         Thought Content: Thought content normal.         Judgment: Judgment normal.     Breast Exam : Symmetric appearance. No masses, nipple discharge, skin retraction, axillary or supraclavicular adenopathy.  Diabetic foot exam:  Skin normal no lesions.slightly dry at heels  DP and PT pulses decreased but no ischemia 1/2-1+  "bilaterally  Feet look good  Callus: none  Deformities: none.  Monofilament testing: Normal.  Gyn: : External Genitalia without lesions. Urethra shows mucosal tag but no red caruncle and mucosa better hydrated. Speculum exam: no lesions or vaginal discharge, cervix without lesions. Bimanual exam: no uterine masses. No ovarian masses.no ovaries palpable. uterus  not obviously enlarged.done to eval use of estrogen.(Pt declined chaperone)    Alexia \"Mary\" was seen today for medicare annual wellness visit subsequent.  Diagnoses and all orders for this visit:  Routine general medical examination at health care facility (Primary)  Type 2 diabetes mellitus with hyperglycemia, with long-term current use of insulin (Multi)  -     POCT glycosylated hemoglobin (Hb A1C) manually resulted  Type 2 diabetes mellitus treated with insulin (Multi)  -     Lantus U-100 Insulin 100 unit/mL injection; Inject 24 Units under the skin once daily at bedtime. Take as directed per insulin instructions.  -     BD Insulin Syringe Ultra-Fine 0.3 mL 31 gauge x 5/16\" syringe; use as directed once daily  -     Discontinue: ramipril (Altace) 10 mg capsule; Take 1 capsule (10 mg) by mouth every 12 hours.  History of bilateral knee replacement  -     amoxicillin (Amoxil) 500 mg capsule; Take 4 capsules (2,000 mg) by mouth if needed (one hour before dental visit).  Prophylactic antibiotic  -     amoxicillin (Amoxil) 500 mg capsule; Take 4 capsules (2,000 mg) by mouth if needed (one hour before dental visit).  -     Discontinue: ramipril (Altace) 10 mg capsule; Take 1 capsule (10 mg) by mouth every 12 hours.  Hypertension, unspecified type  -     Discontinue: amLODIPine (Norvasc) 10 mg tablet; Take 1 tablet (10 mg) by mouth once daily.  -     ECG 12 lead (Clinic Performed)  Primary hypertension  -     Discontinue: hydroCHLOROthiazide (Microzide) 12.5 mg tablet; Take 1 tablet (12.5 mg) by mouth once daily.  High cholesterol  -     Discontinue: " atorvastatin (Lipitor) 20 mg tablet; Take 1 tablet (20 mg) by mouth once daily at bedtime.  Hyperlipidemia due to type 2 diabetes mellitus (Multi)  -     Discontinue: atorvastatin (Lipitor) 20 mg tablet; Take 1 tablet (20 mg) by mouth once daily at bedtime.  Other orders  -     Full code    Treatment Plan:  High a1c level - e11.65 - the patient should continue taking trulicity and lantus for diabetes. the patient should also exercise more, especially after meals, to help with blood sugar levels. the patient should monitor their glucose levels and time in target to see if their blood sugar levels are improving.  High fasting sugar - r73.09 -see above  Decreased peripheral pulses - r23.1 - the patient should have an arterial brachial index test to check their circulation. the patient's decreased peripheral pulses could be due to metoprolol.  Anxiety - f41.9 - the patient's anxiety is currently under control. the patient is seeing doctor dines for anxiety.  Depression - f32.9 - the patient's depression is currently under control. the patient is seeing doctor dines for depression.  The Patient is due for an EKG. The Patient's cholesterol was checked in December. The Patient's kidney blood test, potassium, sodium, and liver tests were normal. The Patient's blood pressure is good. The Patient's mother had bladder cancer. Recommend patient have urine tested annually for microscopic blood. Pt denies hematuria.   The Patient is due for a shingles vaccine and a COVID booster.and should have flu and rsv vaccines in the fall.  Labs:  A1C  Fasting sugar  Cholesterol  Kidney blood test  Potassium  Sodium  Liver tests    Diagnostic Imaging and Procedures:  Colonoscopy  Skin check done this year, recommend annually.  EKG    Recommended Immunizations:  Shingles vaccine  COVID booster    See patient instructions in wrap up plan, orders and comments for treatment plan.    Annual  history and physical completed including  ROS, PE.    Medicare wellness visit  completed including:  Immunizations,   Health Maintenance items,  Discussion of advanced directives and code status, which is: she is a full code, ok cpr  is first hcpoa. We have her acp documentation.  Fall risk and prevention addressed.  Functionality and pain were assessed.   Cancer screening testing was addressed as appropriate-see patient discussion/orders.   Medications were discussed and reviewed/reconciled and refill need assessed/handled.   Patient states taking their medication regularly and appropriately.  Also:   Depression screening PhQ-2 completed: stable on meds   Alcohol misuse screen: negative    In addition to the wellness visit and screening, the above medical issues were addressed:  As well as results of testing completed since last visit.  ECG today normal, done to monitor medications.

## 2024-05-15 ENCOUNTER — OFFICE VISIT (OUTPATIENT)
Dept: PRIMARY CARE | Facility: CLINIC | Age: 69
End: 2024-05-15
Payer: MEDICARE

## 2024-05-15 VITALS
DIASTOLIC BLOOD PRESSURE: 52 MMHG | BODY MASS INDEX: 28.61 KG/M2 | RESPIRATION RATE: 18 BRPM | HEIGHT: 66 IN | HEART RATE: 60 BPM | WEIGHT: 178 LBS | SYSTOLIC BLOOD PRESSURE: 112 MMHG

## 2024-05-15 DIAGNOSIS — Z96.653 HISTORY OF BILATERAL KNEE REPLACEMENT: ICD-10-CM

## 2024-05-15 DIAGNOSIS — E11.9 TYPE 2 DIABETES MELLITUS TREATED WITH INSULIN (MULTI): ICD-10-CM

## 2024-05-15 DIAGNOSIS — Z00.00 ENCOUNTER FOR SUBSEQUENT ANNUAL WELLNESS VISIT (AWV) IN MEDICARE PATIENT: Primary | ICD-10-CM

## 2024-05-15 DIAGNOSIS — I10 PRIMARY HYPERTENSION: ICD-10-CM

## 2024-05-15 DIAGNOSIS — E11.65 TYPE 2 DIABETES MELLITUS WITH HYPERGLYCEMIA, WITH LONG-TERM CURRENT USE OF INSULIN (MULTI): ICD-10-CM

## 2024-05-15 DIAGNOSIS — Z00.00 ROUTINE GENERAL MEDICAL EXAMINATION AT HEALTH CARE FACILITY: ICD-10-CM

## 2024-05-15 DIAGNOSIS — Z79.4 TYPE 2 DIABETES MELLITUS WITH HYPERGLYCEMIA, WITH LONG-TERM CURRENT USE OF INSULIN (MULTI): ICD-10-CM

## 2024-05-15 DIAGNOSIS — N95.2 POSTMENOPAUSAL ATROPHIC VAGINITIS: ICD-10-CM

## 2024-05-15 DIAGNOSIS — Z79.2 PROPHYLACTIC ANTIBIOTIC: ICD-10-CM

## 2024-05-15 DIAGNOSIS — E11.69 HYPERLIPIDEMIA DUE TO TYPE 2 DIABETES MELLITUS (MULTI): ICD-10-CM

## 2024-05-15 DIAGNOSIS — I10 HYPERTENSION, UNSPECIFIED TYPE: ICD-10-CM

## 2024-05-15 DIAGNOSIS — Z79.4 TYPE 2 DIABETES MELLITUS TREATED WITH INSULIN (MULTI): ICD-10-CM

## 2024-05-15 DIAGNOSIS — Z13.89 ENCOUNTER FOR SCREENING FOR OTHER DISORDER: ICD-10-CM

## 2024-05-15 DIAGNOSIS — Z78.9 FULL CODE STATUS: ICD-10-CM

## 2024-05-15 DIAGNOSIS — E78.00 HIGH CHOLESTEROL: ICD-10-CM

## 2024-05-15 DIAGNOSIS — E78.5 HYPERLIPIDEMIA DUE TO TYPE 2 DIABETES MELLITUS (MULTI): ICD-10-CM

## 2024-05-15 LAB — POC HEMOGLOBIN A1C: 8.4 % (ref 4.2–6.5)

## 2024-05-15 PROCEDURE — 1170F FXNL STATUS ASSESSED: CPT | Performed by: INTERNAL MEDICINE

## 2024-05-15 PROCEDURE — 93000 ELECTROCARDIOGRAM COMPLETE: CPT | Performed by: INTERNAL MEDICINE

## 2024-05-15 PROCEDURE — 3051F HG A1C>EQUAL 7.0%<8.0%: CPT | Performed by: INTERNAL MEDICINE

## 2024-05-15 PROCEDURE — 1159F MED LIST DOCD IN RCRD: CPT | Performed by: INTERNAL MEDICINE

## 2024-05-15 PROCEDURE — 1157F ADVNC CARE PLAN IN RCRD: CPT | Performed by: INTERNAL MEDICINE

## 2024-05-15 PROCEDURE — 1126F AMNT PAIN NOTED NONE PRSNT: CPT | Performed by: INTERNAL MEDICINE

## 2024-05-15 PROCEDURE — 1160F RVW MEDS BY RX/DR IN RCRD: CPT | Performed by: INTERNAL MEDICINE

## 2024-05-15 PROCEDURE — 83036 HEMOGLOBIN GLYCOSYLATED A1C: CPT | Performed by: INTERNAL MEDICINE

## 2024-05-15 PROCEDURE — 99497 ADVNCD CARE PLAN 30 MIN: CPT | Performed by: INTERNAL MEDICINE

## 2024-05-15 PROCEDURE — 3078F DIAST BP <80 MM HG: CPT | Performed by: INTERNAL MEDICINE

## 2024-05-15 PROCEDURE — G0442 ANNUAL ALCOHOL SCREEN 15 MIN: HCPCS | Performed by: INTERNAL MEDICINE

## 2024-05-15 PROCEDURE — 4010F ACE/ARB THERAPY RXD/TAKEN: CPT | Performed by: INTERNAL MEDICINE

## 2024-05-15 PROCEDURE — G0439 PPPS, SUBSEQ VISIT: HCPCS | Performed by: INTERNAL MEDICINE

## 2024-05-15 PROCEDURE — 3074F SYST BP LT 130 MM HG: CPT | Performed by: INTERNAL MEDICINE

## 2024-05-15 PROCEDURE — 99397 PER PM REEVAL EST PAT 65+ YR: CPT | Performed by: INTERNAL MEDICINE

## 2024-05-15 RX ORDER — AMLODIPINE BESYLATE 10 MG/1
10 TABLET ORAL DAILY
Qty: 100 TABLET | Refills: 3 | Status: SHIPPED | OUTPATIENT
Start: 2024-05-15 | End: 2024-05-23 | Stop reason: SDUPTHER

## 2024-05-15 RX ORDER — INSULIN GLARGINE 100 [IU]/ML
24 INJECTION, SOLUTION SUBCUTANEOUS NIGHTLY
Qty: 30 ML | Refills: 11 | Status: SHIPPED | OUTPATIENT
Start: 2024-05-15

## 2024-05-15 RX ORDER — ATORVASTATIN CALCIUM 20 MG/1
20 TABLET, FILM COATED ORAL NIGHTLY
Qty: 100 TABLET | Refills: 3 | Status: SHIPPED | OUTPATIENT
Start: 2024-05-15 | End: 2024-05-23 | Stop reason: SDUPTHER

## 2024-05-15 RX ORDER — AMOXICILLIN 500 MG/1
2000 CAPSULE ORAL AS NEEDED
Qty: 8 CAPSULE | Refills: 1 | Status: SHIPPED | OUTPATIENT
Start: 2024-05-15

## 2024-05-15 RX ORDER — HYDROCHLOROTHIAZIDE 12.5 MG/1
12.5 TABLET ORAL DAILY
Qty: 100 TABLET | Refills: 3 | Status: SHIPPED | OUTPATIENT
Start: 2024-05-15 | End: 2024-05-23 | Stop reason: SDUPTHER

## 2024-05-15 RX ORDER — PEN NEEDLE, DIABETIC 29 G X1/2"
NEEDLE, DISPOSABLE MISCELLANEOUS
Qty: 100 EACH | Refills: 3 | Status: SHIPPED | OUTPATIENT
Start: 2024-05-15

## 2024-05-15 RX ORDER — RAMIPRIL 10 MG/1
10 CAPSULE ORAL EVERY 12 HOURS
Qty: 200 CAPSULE | Refills: 3 | Status: SHIPPED | OUTPATIENT
Start: 2024-05-15 | End: 2024-05-23 | Stop reason: SDUPTHER

## 2024-05-15 SDOH — ECONOMIC STABILITY: INCOME INSECURITY: IN THE LAST 12 MONTHS, WAS THERE A TIME WHEN YOU WERE NOT ABLE TO PAY THE MORTGAGE OR RENT ON TIME?: NO

## 2024-05-15 SDOH — ECONOMIC STABILITY: HOUSING INSECURITY: IN THE LAST 12 MONTHS, HOW MANY PLACES HAVE YOU LIVED?: 1

## 2024-05-15 SDOH — ECONOMIC STABILITY: FOOD INSECURITY: WITHIN THE PAST 12 MONTHS, YOU WORRIED THAT YOUR FOOD WOULD RUN OUT BEFORE YOU GOT MONEY TO BUY MORE.: NEVER TRUE

## 2024-05-15 SDOH — HEALTH STABILITY: PHYSICAL HEALTH: ON AVERAGE, HOW MANY DAYS PER WEEK DO YOU ENGAGE IN MODERATE TO STRENUOUS EXERCISE (LIKE A BRISK WALK)?: 2 DAYS

## 2024-05-15 SDOH — HEALTH STABILITY: PHYSICAL HEALTH: ON AVERAGE, HOW MANY MINUTES DO YOU ENGAGE IN EXERCISE AT THIS LEVEL?: 20 MIN

## 2024-05-15 SDOH — ECONOMIC STABILITY: FOOD INSECURITY: WITHIN THE PAST 12 MONTHS, THE FOOD YOU BOUGHT JUST DIDN'T LAST AND YOU DIDN'T HAVE MONEY TO GET MORE.: NEVER TRUE

## 2024-05-15 ASSESSMENT — ANXIETY QUESTIONNAIRES
GAD7 TOTAL SCORE: 0
3. WORRYING TOO MUCH ABOUT DIFFERENT THINGS: NOT AT ALL
6. BECOMING EASILY ANNOYED OR IRRITABLE: NOT AT ALL
2. NOT BEING ABLE TO STOP OR CONTROL WORRYING: NOT AT ALL
5. BEING SO RESTLESS THAT IT IS HARD TO SIT STILL: NOT AT ALL
1. FEELING NERVOUS, ANXIOUS, OR ON EDGE: NOT AT ALL
4. TROUBLE RELAXING: NOT AT ALL
7. FEELING AFRAID AS IF SOMETHING AWFUL MIGHT HAPPEN: NOT AT ALL
IF YOU CHECKED OFF ANY PROBLEMS ON THIS QUESTIONNAIRE, HOW DIFFICULT HAVE THESE PROBLEMS MADE IT FOR YOU TO DO YOUR WORK, TAKE CARE OF THINGS AT HOME, OR GET ALONG WITH OTHER PEOPLE: NOT DIFFICULT AT ALL

## 2024-05-15 ASSESSMENT — PATIENT HEALTH QUESTIONNAIRE - PHQ9
2. FEELING DOWN, DEPRESSED OR HOPELESS: NOT AT ALL
SUM OF ALL RESPONSES TO PHQ9 QUESTIONS 1 & 2: 0
SUM OF ALL RESPONSES TO PHQ9 QUESTIONS 1 AND 2: 0
1. LITTLE INTEREST OR PLEASURE IN DOING THINGS: NOT AT ALL
2. FEELING DOWN, DEPRESSED OR HOPELESS: NOT AT ALL
1. LITTLE INTEREST OR PLEASURE IN DOING THINGS: NOT AT ALL
1. LITTLE INTEREST OR PLEASURE IN DOING THINGS: NOT AT ALL
2. FEELING DOWN, DEPRESSED OR HOPELESS: NOT AT ALL
SUM OF ALL RESPONSES TO PHQ9 QUESTIONS 1 AND 2: 0

## 2024-05-15 ASSESSMENT — LIFESTYLE VARIABLES
AUDIT-C TOTAL SCORE: 0
HOW MANY STANDARD DRINKS CONTAINING ALCOHOL DO YOU HAVE ON A TYPICAL DAY: PATIENT DOES NOT DRINK
HOW OFTEN DO YOU HAVE SIX OR MORE DRINKS ON ONE OCCASION: NEVER
SKIP TO QUESTIONS 9-10: 1
HOW OFTEN DO YOU HAVE A DRINK CONTAINING ALCOHOL: NEVER

## 2024-05-15 ASSESSMENT — SOCIAL DETERMINANTS OF HEALTH (SDOH)
HOW OFTEN DO YOU ATTENT MEETINGS OF THE CLUB OR ORGANIZATION YOU BELONG TO?: MORE THAN 4 TIMES PER YEAR
HOW HARD IS IT FOR YOU TO PAY FOR THE VERY BASICS LIKE FOOD, HOUSING, MEDICAL CARE, AND HEATING?: NOT HARD AT ALL
WITHIN THE LAST YEAR, HAVE TO BEEN RAPED OR FORCED TO HAVE ANY KIND OF SEXUAL ACTIVITY BY YOUR PARTNER OR EX-PARTNER?: NO
IN A TYPICAL WEEK, HOW MANY TIMES DO YOU TALK ON THE PHONE WITH FAMILY, FRIENDS, OR NEIGHBORS?: MORE THAN THREE TIMES A WEEK
WITHIN THE LAST YEAR, HAVE YOU BEEN AFRAID OF YOUR PARTNER OR EX-PARTNER?: NO
IN THE PAST 12 MONTHS, HAS THE ELECTRIC, GAS, OIL, OR WATER COMPANY THREATENED TO SHUT OFF SERVICE IN YOUR HOME?: NO
DO YOU BELONG TO ANY CLUBS OR ORGANIZATIONS SUCH AS CHURCH GROUPS UNIONS, FRATERNAL OR ATHLETIC GROUPS, OR SCHOOL GROUPS?: YES
HOW OFTEN DO YOU ATTEND CHURCH OR RELIGIOUS SERVICES?: MORE THAN 4 TIMES PER YEAR
WITHIN THE LAST YEAR, HAVE YOU BEEN HUMILIATED OR EMOTIONALLY ABUSED IN OTHER WAYS BY YOUR PARTNER OR EX-PARTNER?: NO
HOW OFTEN DO YOU GET TOGETHER WITH FRIENDS OR RELATIVES?: MORE THAN THREE TIMES A WEEK
WITHIN THE LAST YEAR, HAVE YOU BEEN KICKED, HIT, SLAPPED, OR OTHERWISE PHYSICALLY HURT BY YOUR PARTNER OR EX-PARTNER?: NO

## 2024-05-15 ASSESSMENT — COLUMBIA-SUICIDE SEVERITY RATING SCALE - C-SSRS
2. HAVE YOU ACTUALLY HAD ANY THOUGHTS OF KILLING YOURSELF?: NO
1. IN THE PAST MONTH, HAVE YOU WISHED YOU WERE DEAD OR WISHED YOU COULD GO TO SLEEP AND NOT WAKE UP?: NO
6. HAVE YOU EVER DONE ANYTHING, STARTED TO DO ANYTHING, OR PREPARED TO DO ANYTHING TO END YOUR LIFE?: NO

## 2024-05-15 ASSESSMENT — ACTIVITIES OF DAILY LIVING (ADL)
BATHING: INDEPENDENT
DRESSING: INDEPENDENT

## 2024-05-15 ASSESSMENT — PAIN SCALES - GENERAL: PAINLEVEL: 0-NO PAIN

## 2024-05-22 DIAGNOSIS — E11.9 TYPE 2 DIABETES MELLITUS TREATED WITH INSULIN (MULTI): ICD-10-CM

## 2024-05-22 DIAGNOSIS — I10 PRIMARY HYPERTENSION: ICD-10-CM

## 2024-05-22 DIAGNOSIS — E78.00 HIGH CHOLESTEROL: ICD-10-CM

## 2024-05-22 DIAGNOSIS — E78.5 HYPERLIPIDEMIA DUE TO TYPE 2 DIABETES MELLITUS (MULTI): ICD-10-CM

## 2024-05-22 DIAGNOSIS — I10 HYPERTENSION, UNSPECIFIED TYPE: ICD-10-CM

## 2024-05-22 DIAGNOSIS — E11.69 HYPERLIPIDEMIA DUE TO TYPE 2 DIABETES MELLITUS (MULTI): ICD-10-CM

## 2024-05-22 DIAGNOSIS — Z79.2 PROPHYLACTIC ANTIBIOTIC: ICD-10-CM

## 2024-05-22 DIAGNOSIS — Z79.4 TYPE 2 DIABETES MELLITUS TREATED WITH INSULIN (MULTI): ICD-10-CM

## 2024-05-22 NOTE — TELEPHONE ENCOUNTER
Patient called stating you sent for 100 days and she said it have to be for 90 days in order for the pharmacy to accept.    Please make changes for the following meds:    Amlodipine  Ramipril  Atorvastatin  Hydrochlorothiazide

## 2024-05-24 RX ORDER — AMLODIPINE BESYLATE 10 MG/1
10 TABLET ORAL DAILY
Qty: 90 TABLET | Refills: 3 | Status: SHIPPED | OUTPATIENT
Start: 2024-05-24

## 2024-05-24 RX ORDER — RAMIPRIL 10 MG/1
10 CAPSULE ORAL EVERY 12 HOURS
Qty: 180 CAPSULE | Refills: 3 | Status: SHIPPED | OUTPATIENT
Start: 2024-05-24

## 2024-05-24 RX ORDER — ATORVASTATIN CALCIUM 20 MG/1
20 TABLET, FILM COATED ORAL NIGHTLY
Qty: 90 TABLET | Refills: 3 | Status: SHIPPED | OUTPATIENT
Start: 2024-05-24

## 2024-05-24 RX ORDER — HYDROCHLOROTHIAZIDE 12.5 MG/1
12.5 TABLET ORAL DAILY
Qty: 90 TABLET | Refills: 3 | Status: SHIPPED | OUTPATIENT
Start: 2024-05-24

## 2024-06-14 ENCOUNTER — PHARMACY VISIT (OUTPATIENT)
Dept: PHARMACY | Facility: CLINIC | Age: 69
End: 2024-06-14
Payer: MEDICARE

## 2024-06-14 PROCEDURE — RXMED WILLOW AMBULATORY MEDICATION CHARGE

## 2024-08-07 NOTE — PROGRESS NOTES
"Patient ID: Alexia Cho \"Mary\" is a 69 y.o. female who presents for Follow-up (Pt here for follow up and review. Pt denies any new problems or concerns or new medications. Pt is unsure if she needs Lantus or not, I am unsure, it looks like it may have been discontinued in March? A1c over 8 3 months ago. Will ck today.)  LAST VISIT PLAN FROM: 05/15/2024  Treatment Plan:  High a1c level - e11.65 - the patient should continue taking trulicity and lantus for diabetes. the patient should also exercise more, especially after meals, to help with blood sugar levels. the patient should monitor their glucose levels and time in target to see if their blood sugar levels are improving.  High fasting sugar - r73.09 -see above  Decreased peripheral pulses - r23.1 - the patient should have an arterial brachial index test to check their circulation. the patient's decreased peripheral pulses could be due to metoprolol.  Anxiety - f41.9 - the patient's anxiety is currently under control. the patient is seeing doctor dines for anxiety.  Depression - f32.9 - the patient's depression is currently under control. the patient is seeing doctor dines for depression.  The Patient is due for an EKG. The Patient's cholesterol was checked in December. The Patient's kidney blood test, potassium, sodium, and liver tests were normal. The Patient's blood pressure is good. The Patient's mother had bladder cancer. Recommend patient have urine tested annually for microscopic blood. Pt denies hematuria.   The Patient is due for a shingles vaccine and a COVID booster.and should have flu and rsv vaccines in the fall.  Labs:  A1C  Fasting sugar  Cholesterol  Kidney blood test  Potassium  Sodium  Liver tests  Diagnostic Imaging and Procedures:  Colonoscopy  Skin check done this year, recommend annually.  EKG  Recommended Immunizations:  Shingles vaccine  COVID booster  See patient instructions in wrap up plan, orders and comments for treatment " plan.  Annual  history and physical completed including  ROS, PE.   Medicare wellness visit  completed including:  Immunizations,   Health Maintenance items,  Discussion of advanced directives and code status, which is: she is a full code, ok cpr  is first hcpoa. We have her acp documentation.  Fall risk and prevention addressed.  Functionality and pain were assessed.   Cancer screening testing was addressed as appropriate-see patient discussion/orders.   Medications were discussed and reviewed/reconciled and refill need assessed/handled.   Patient states taking their medication regularly and appropriately.  Also:   Depression screening PhQ-2 completed: stable on meds   Alcohol misuse screen: negative  In addition to the wellness visit and screening, the above medical issues were addressed:  As well as results of testing completed since last visit.  ECG today normal, done to monitor medications.  END LAST VISIT PLAN  -------------------------------------------  Diabetes  She has type 2 diabetes mellitus. No MedicAlert identification noted. The initial diagnosis of diabetes was made 10 years ago. Pertinent negatives for hypoglycemia include no confusion, dizziness, headaches, hunger, mood changes, nervousness/anxiousness, pallor, seizures, sleepiness, speech difficulty, sweats or tremors. Pertinent negatives for diabetes include no blurred vision, no chest pain, no fatigue, no foot paresthesias, no foot ulcerations, no polydipsia, no polyphagia, no polyuria, no visual change, no weakness and no weight loss. Pertinent negatives for hypoglycemia complications include no blackouts, no hospitalization, no nocturnal hypoglycemia, no required assistance and no required glucagon injection. Pertinent negatives for diabetic complications include no CVA, heart disease, impotence, nephropathy, peripheral neuropathy, PVD or retinopathy. Risk factors for coronary artery disease include dyslipidemia, hypertension, obesity  and post-menopausal. Current diabetic treatment includes insulin injections and oral agent (monotherapy). She is compliant with treatment some of the time. She is currently taking insulin at bedtime. Insulin injections are given by patient. Rotation sites for injection include the abdominal wall. Her weight is stable. She is following a generally healthy diet. Meal planning includes avoidance of concentrated sweets and carbohydrate counting. She has not had a previous visit with a dietitian. She participates in exercise three times a week. She monitors blood glucose at home 1-2 x per day. Blood glucose monitoring compliance is fair. There is no change in her home blood glucose trend. Her breakfast blood glucose is taken between 9-10 am. Her breakfast blood glucose range is generally 140-180 mg/dl. Her lunch blood glucose is taken between 12-1 pm. Her lunch blood glucose range is generally 140-180 mg/dl. Her dinner blood glucose is taken between 5-6 pm. Her dinner blood glucose range is generally >200 mg/dl. Her bedtime blood glucose is taken after 11 pm. Her bedtime blood glucose range is generally 140-180 mg/dl. Her overall blood glucose range is 140-180 mg/dl. She does not see a podiatrist.Eye exam is current.     Patient is a 69-year-old female who presents today for follow up. On htn dm2, hld, see notes above. Reviewed.    Patient denies significant concerns today.    Anxiety:  Stable.    Diabetes:  Type II:  Is taking medications regularly, denies side effects.  Denies hypoglycemia, polyuria, polydipsia.  No new numbness or paresthesias of extremities. No syncope or dizziness. No blurred vision.  Lab Results   Component Value Date    HGBA1C 8.1 (A) 08/12/2024    HGBA1C 8.4 (A) 05/15/2024    HGBA1C 7.5 (H) 03/14/2024     Lab Results   Component Value Date    LDLCALC 78 12/15/2023    CREATININE 0.82 05/10/2024      Lab Results   Component Value Date    GLUCOSE 150 (H) 05/10/2024    CALCIUM 9.8 05/10/2024    NA  139 05/10/2024    K 4.5 05/10/2024    CO2 29 05/10/2024     05/10/2024    BUN 20 05/10/2024    CREATININE 0.82 05/10/2024      Encounter Date: 05/15/24   ECG 12 lead (Clinic Performed)    Narrative    Normal Sinus rhythm.   Horizontal axis.  Normal ECG.         A1c today was 8.1% compared to 8.4% at last visit.  Continuous glucose monitor data    Duration (days) %Above Target % In Range %Low     Average glucose Usage rate/scans per day    Set Target Range (low-high)    7 days            14 days  68%    32%   30 days      64%   34%  90 days  64%   34%      Any lows symptomatic? No     We discussed her diet.  She states that she has eaten more fast food since the summer started, usually for dinner.  Her sugar increases to 200 mg/dL after lunch.  She states usually having a turkey sandwich.  Recommended trying charcuterie for lunch without bread. Recommended avoiding fast food as it can have hidden sugar.  We discussed reducing bread intake or finding bread with no sugar.   She states she has not seen a dietician for over a year.  Of note, she has not gained weight since last visit.  She is at the maximum dose of Trulicity 4.5 mg injection once per week.  She has not tried Mounjaro.  Recommended seeing Deshaun at Pharm D for possibility of switching to Mounjaro dependent upon her insurance coverage.  Recommended walking for 15 minutes within 45 minutes after eating, as well as following a Mediterranean diet.  I will not increase her insulin today, as we may be making the switch to Mounjaro in the near future.  Referral placed for Deshaun at [facility].    SGLT-2 inhibitors (Farxiga): No hematuria, dysuria, or yeast infections.     GLP-1 agonists (Trulicity): No nausea, vomiting, abdominal pain and anterior neck pain/swelling.     Cardiac: No CP , palpitations, increased TRINH.  Resp: No SOB, wheezing, cough.  Extremities: No leg or ankle swelling, no claudication.  Neuro: No dizziness, syncope, blurred  "vision. No new headaches.     She denies BP checks at home.  We discussed cutting her metoprolol in half.    We discussed getting the RSV, influenza and Covid vaccine in Fall 2024.  Advised not to get more than 1 vaccine at a time.    We discussed the Shingrix vaccine. This is a vaccine to prevent or lessen the severity of shingles. It  is much more effective than the prior Zostavax vaccine. It is a two shot series, with the second shot given 2-6 months after the first.  It can make your arm sore and can transiently cause fever chills body aches. It should preferably not be administered within 2 weeks of any other viral vaccine. It should not be given during a time of illness.This can be obtained at your pharmacy, the [facility], or via a nurse appointment at our office: recommend checking with your insurance for best coverage.     Follow up 11/2024.    Review of Systems   Constitutional:  Negative for fatigue and weight loss.   Eyes:  Negative for blurred vision.   Cardiovascular:  Negative for chest pain.   Endocrine: Negative for polydipsia, polyphagia and polyuria.   Genitourinary:  Negative for impotence.   Skin:  Negative for pallor.   Neurological:  Negative for dizziness, tremors, seizures, speech difficulty, weakness and headaches.   Psychiatric/Behavioral:  Negative for confusion. The patient is not nervous/anxious.      See ROS in HPI    Current Outpatient Medications   Medication Instructions    amLODIPine (NORVASC) 10 mg, oral, Daily    amoxicillin (AMOXIL) 2,000 mg, oral, As needed    atorvastatin (LIPITOR) 20 mg, oral, Nightly    BD Insulin Syringe Ultra-Fine 0.3 mL 31 gauge x 5/16\" syringe use as directed once daily    cholecalciferol (Vitamin D-3) 50 mcg (2,000 unit) capsule oral    cyanocobalamin (Vitamin B-12) 1,000 mcg tablet 1 tablet, oral, Daily    DULoxetine (CYMBALTA) 60 mg, oral, 2 times daily    estradiol (Estrace) 0.01 % (0.1 mg/gram) vaginal cream insert 1/4 gram " "vaginally daily or as directed and rub SMALL AMOUNT OF CREAM EXTERNALY    Farxiga 10 mg TAKE 1 TABLET EVERY MORNINGBEFORE A MEAL.    FreeStyle Lancets 28 gauge use 1 LANCET to TEST BLOOD SUGAR twice a day    FreeStyle Darren 14 Day Sensor kit APPLY DEVICE TO POSTERIOR ARM AND LEAVE FOR 14 DAYS. USE REMOTE TO SCAN AND REVIEW SUGARS    FreeStyle Lite Strips strip use 1 TEST STRIP to TEST BLOOD SUGAR once daily    hydroCHLOROthiazide (MICROZIDE) 12.5 mg, oral, Daily    Lactobacillus rhamnosus GG (CULTURELLE) 15 billion cell capsule, sprinkle capsule oral    Lantus U-100 Insulin 24 Units, subcutaneous, Nightly, Take as directed per insulin instructions.    metoprolol succinate XL (TOPROL XL) 100 mg, oral, Daily, Do not crush or chew.    mirtazapine (REMERON) 30 mg, oral, Nightly    psyllium (Metamucil, sugar,) powder oral, Daily RT    ramipril (ALTACE) 10 mg, oral, Every 12 hours    spironolactone (ALDACTONE) 25 mg, oral, Daily    Trulicity 4.5 mg, subcutaneous, Once Weekly     Allergies   Allergen Reactions    Ciprofloxacin Hives    Diclofenac Sodium Unknown    Erythromycin Base Diarrhea    Naproxen Diarrhea     Objective    Results  reviewed:   Latest Complete Lab Results:    Chemistry    Lab Results   Component Value Date/Time     05/10/2024 0912    K 4.5 05/10/2024 0912     05/10/2024 0912    CO2 29 05/10/2024 0912    BUN 20 05/10/2024 0912    CREATININE 0.82 05/10/2024 0912    Lab Results   Component Value Date/Time    CALCIUM 9.8 05/10/2024 0912    ALKPHOS 65 05/10/2024 0912    AST 20 05/10/2024 0912    ALT 29 05/10/2024 0912    BILITOT 0.6 05/10/2024 0912           Lab Results   Component Value Date    WBC 7.2 12/15/2023    HGB 15.8 12/15/2023    HCT 46.3 (H) 12/15/2023    MCV 94 12/15/2023     12/15/2023      Lab Results   Component Value Date    HGBA1C 8.1 (A) 08/12/2024      Lab Results   Component Value Date    LDLCALC 78 12/15/2023      No results found for: \"ALBUR\", \"[id number]\"   Lab " "Results   Component Value Date    JPDBZMUH27 1,050 (H) 12/15/2023      Lab Results   Component Value Date    TSH 1.59 12/15/2023    THYROIDPAB <10 04/29/2019      No results found for: \"PSA\"   Lab Results   Component Value Date    CHOL 133 12/15/2023    CHOL 150 01/20/2023    CHOL 145 07/07/2021     Lab Results   Component Value Date    HDL 34.1 12/15/2023    HDL 38.4 (A) 01/20/2023    HDL 33.3 (A) 07/07/2021     Lab Results   Component Value Date    LDLCALC 78 12/15/2023     Lab Results   Component Value Date    TRIG 107 12/15/2023    TRIG 150 (H) 01/20/2023    TRIG 155 (H) 07/07/2021     Physical Exam  Vitals:      5/9/2023    12:41 PM 9/5/2023     9:08 AM 9/5/2023     9:57 AM 12/12/2023     9:49 AM 3/18/2024    11:56 AM 5/15/2024    10:40 AM 8/12/2024     9:39 AM   Vitals   Systolic 110 98 104 96 109 112 100   Diastolic 70 54 60 52 70 52 52   Heart Rate  72 72 64 80 60 60   Resp  18  18  18 18   Height (in)  1.689 m (5' 6.5\")  1.702 m (5' 7\") 1.702 m (5' 7\") 1.676 m (5' 6\") 1.664 m (5' 5.5\")   Weight (lb)  170  174 172 178 170   BMI  27.03 kg/m2  27.25 kg/m2 26.94 kg/m2 28.73 kg/m2 27.86 kg/m2   BSA (m2)  1.9 m2  1.93 m2 1.92 m2 1.94 m2 1.89 m2   Visit Report Report Report Report Report Report Report Report     Patient looks well and is in no obvious distress.  HEENT:   Normocephalic, no facial asymmetry  Eyes: Sclera and conjunctiva are clear.  Neck: No adenopathy cervical (Ant/post/lat), no Supraclavicular nodes.   Thyroid mild to moderate goiter, bumpy chronic stable  Carotid pulses normal, no carotid bruits.  Lungs : RR normal. Clear to auscultation anterior, posterior and lateral. No rales, wheezes rhonchi or rubs. Good air exchange.  Heart: RRR. 1/6 systolic Murmur ursb, ulsb, no change, No gallop, click or rub.  Abdomen: Bowel sounds normal, No bruits. No pulsatile mass. No hepatosplenomegaly, masses or tenderness. Soft, no guarding.  Vascular:  Posterior tibialis and dorsalis pedis pulses within normal " "limits bilaterally.   Extremities: no upper extremity edema. No lower extremity edema.   Musculoskeletal: no synovitis of joints seen. No new deformity.  Neuro: CN 2-12 intact. Alert, appropriate.  Ambulates independently.  No gross motor deficit.   No tremors.  Psych: normal mood and affect.  Skin: No rash, bruising petechiae or jaundice.      Alexia \"Mary\" was seen today for follow-up.  Diagnoses and all orders for this visit:  Primary hypertension (Primary)  Elevated hemoglobin A1c  -     POCT glycosylated hemoglobin (Hb A1C) manually resulted  Type 2 diabetes mellitus with hyperglycemia, with long-term current use of insulin (Multi)  -     Follow Up In [facility]; Future  Type 2 diabetes mellitus treated with insulin (Multi)  -     Lantus U-100 Insulin 100 unit/mL injection; Inject 24 Units under the skin once daily at bedtime. Take as directed per insulin instructions.  Heart murmur  Medication dose changed  Low blood pressure reading  Hyperlipidemia due to type 2 diabetes mellitus (Multi)  Dietary counseling     I am the Internal Medicine physician providing ongoing chronic medical care for this patient, which is managed during and in between office visits.    Patient Instructions:  Avoid fast foods -especially any bread products or french fries. Salad dressings with no sugar are best, or just olive oil and vinegar. Lunch: Try lunch meat fresh veggies instead of bread, and your fruit.   Mediterranean diet is best.    Decrease metoprolol succinate to 1/2 tab daily which is 50 mg daily. Your blood pressure is good, your heart rate is ok. Will see how you do with this lower dose.    Referral to Deshaun: Pharm D. Consideration of changing trulicity to mounjaro.    A1c is 8.1 today.    Keep November appt:    Recommend RSV vaccine September.  Covid booster when new one is out this fall.  Flu shot this October.  Shingrix recommended.      ------  Problem List Items Addressed This Visit       " Heart murmur    Hyperlipidemia due to type 2 diabetes mellitus (Multi)    Hypertension - Primary    Low blood pressure reading    Medication dose changed    Type 2 diabetes mellitus with hyperglycemia, with long-term current use of insulin (Multi)    Relevant Orders    Follow Up In [facility]     Other Visit Diagnoses       Elevated hemoglobin A1c        Relevant Orders    POCT glycosylated hemoglobin (Hb A1C) manually resulted (Completed)    Type 2 diabetes mellitus treated with insulin (Multi)        Relevant Medications    Lantus U-100 Insulin 100 unit/mL injection    Dietary counseling              See patient instructions in wrap up plan, orders and comments for treatment plan.  Patient Instructions:    Scribe Attestation  By signing my name below, Jayla REYES, Scribjay   attest that this documentation has been prepared under the direction and in the presence of Nat Wasserman MD.

## 2024-08-09 PROCEDURE — RXMED WILLOW AMBULATORY MEDICATION CHARGE

## 2024-08-10 ENCOUNTER — PHARMACY VISIT (OUTPATIENT)
Dept: PHARMACY | Facility: CLINIC | Age: 69
End: 2024-08-10
Payer: MEDICARE

## 2024-08-11 ASSESSMENT — ENCOUNTER SYMPTOMS
SPEECH DIFFICULTY: 0
BLACKOUTS: 0
VISUAL CHANGE: 0
DIZZINESS: 0
WEIGHT LOSS: 0
FATIGUE: 0
BLURRED VISION: 0
SWEATS: 0
POLYPHAGIA: 0
SEIZURES: 0
NERVOUS/ANXIOUS: 0
CONFUSION: 0
WEAKNESS: 0
HEADACHES: 0
POLYDIPSIA: 0
HUNGER: 0
TREMORS: 0

## 2024-08-12 ENCOUNTER — APPOINTMENT (OUTPATIENT)
Dept: PRIMARY CARE | Facility: CLINIC | Age: 69
End: 2024-08-12
Payer: MEDICARE

## 2024-08-12 VITALS
DIASTOLIC BLOOD PRESSURE: 52 MMHG | WEIGHT: 170 LBS | SYSTOLIC BLOOD PRESSURE: 100 MMHG | BODY MASS INDEX: 27.32 KG/M2 | HEART RATE: 60 BPM | RESPIRATION RATE: 18 BRPM | HEIGHT: 66 IN

## 2024-08-12 DIAGNOSIS — Z79.899 MEDICATION DOSE CHANGED: ICD-10-CM

## 2024-08-12 DIAGNOSIS — E11.9 TYPE 2 DIABETES MELLITUS TREATED WITH INSULIN (MULTI): ICD-10-CM

## 2024-08-12 DIAGNOSIS — E78.5 HYPERLIPIDEMIA DUE TO TYPE 2 DIABETES MELLITUS (MULTI): ICD-10-CM

## 2024-08-12 DIAGNOSIS — Z79.4 TYPE 2 DIABETES MELLITUS WITH HYPERGLYCEMIA, WITH LONG-TERM CURRENT USE OF INSULIN (MULTI): ICD-10-CM

## 2024-08-12 DIAGNOSIS — R03.1 LOW BLOOD PRESSURE READING: ICD-10-CM

## 2024-08-12 DIAGNOSIS — E11.65 TYPE 2 DIABETES MELLITUS WITH HYPERGLYCEMIA, WITH LONG-TERM CURRENT USE OF INSULIN (MULTI): ICD-10-CM

## 2024-08-12 DIAGNOSIS — I10 PRIMARY HYPERTENSION: Primary | ICD-10-CM

## 2024-08-12 DIAGNOSIS — E11.69 HYPERLIPIDEMIA DUE TO TYPE 2 DIABETES MELLITUS (MULTI): ICD-10-CM

## 2024-08-12 DIAGNOSIS — R73.09 ELEVATED HEMOGLOBIN A1C: ICD-10-CM

## 2024-08-12 DIAGNOSIS — Z79.4 TYPE 2 DIABETES MELLITUS TREATED WITH INSULIN (MULTI): ICD-10-CM

## 2024-08-12 DIAGNOSIS — R01.1 HEART MURMUR: ICD-10-CM

## 2024-08-12 DIAGNOSIS — Z71.3 DIETARY COUNSELING: ICD-10-CM

## 2024-08-12 LAB — POC HEMOGLOBIN A1C: 8.1 % (ref 4.2–6.5)

## 2024-08-12 PROCEDURE — 3051F HG A1C>EQUAL 7.0%<8.0%: CPT | Performed by: INTERNAL MEDICINE

## 2024-08-12 PROCEDURE — 1158F ADVNC CARE PLAN TLK DOCD: CPT | Performed by: INTERNAL MEDICINE

## 2024-08-12 PROCEDURE — 1123F ACP DISCUSS/DSCN MKR DOCD: CPT | Performed by: INTERNAL MEDICINE

## 2024-08-12 PROCEDURE — 3074F SYST BP LT 130 MM HG: CPT | Performed by: INTERNAL MEDICINE

## 2024-08-12 PROCEDURE — 1157F ADVNC CARE PLAN IN RCRD: CPT | Performed by: INTERNAL MEDICINE

## 2024-08-12 PROCEDURE — G2211 COMPLEX E/M VISIT ADD ON: HCPCS | Performed by: INTERNAL MEDICINE

## 2024-08-12 PROCEDURE — 3008F BODY MASS INDEX DOCD: CPT | Performed by: INTERNAL MEDICINE

## 2024-08-12 PROCEDURE — 1160F RVW MEDS BY RX/DR IN RCRD: CPT | Performed by: INTERNAL MEDICINE

## 2024-08-12 PROCEDURE — 1159F MED LIST DOCD IN RCRD: CPT | Performed by: INTERNAL MEDICINE

## 2024-08-12 PROCEDURE — 3078F DIAST BP <80 MM HG: CPT | Performed by: INTERNAL MEDICINE

## 2024-08-12 PROCEDURE — 99214 OFFICE O/P EST MOD 30 MIN: CPT | Performed by: INTERNAL MEDICINE

## 2024-08-12 PROCEDURE — 1126F AMNT PAIN NOTED NONE PRSNT: CPT | Performed by: INTERNAL MEDICINE

## 2024-08-12 PROCEDURE — 4010F ACE/ARB THERAPY RXD/TAKEN: CPT | Performed by: INTERNAL MEDICINE

## 2024-08-12 PROCEDURE — 83036 HEMOGLOBIN GLYCOSYLATED A1C: CPT | Performed by: INTERNAL MEDICINE

## 2024-08-12 RX ORDER — INSULIN GLARGINE 100 [IU]/ML
24 INJECTION, SOLUTION SUBCUTANEOUS NIGHTLY
Qty: 10 ML | Refills: 11 | Status: SHIPPED | OUTPATIENT
Start: 2024-08-12

## 2024-08-12 ASSESSMENT — ENCOUNTER SYMPTOMS
VISUAL CHANGE: 0
BLURRED VISION: 0
WEAKNESS: 0
POLYDIPSIA: 0
TREMORS: 0
SPEECH DIFFICULTY: 0
NERVOUS/ANXIOUS: 0
HEADACHES: 0
BLACKOUTS: 0
FATIGUE: 0
POLYPHAGIA: 0
WEIGHT LOSS: 0
SWEATS: 0
SEIZURES: 0
HUNGER: 0
DIZZINESS: 0
CONFUSION: 0

## 2024-08-12 ASSESSMENT — PATIENT HEALTH QUESTIONNAIRE - PHQ9
2. FEELING DOWN, DEPRESSED OR HOPELESS: NOT AT ALL
SUM OF ALL RESPONSES TO PHQ9 QUESTIONS 1 AND 2: 0
1. LITTLE INTEREST OR PLEASURE IN DOING THINGS: NOT AT ALL

## 2024-08-12 ASSESSMENT — PAIN SCALES - GENERAL: PAINLEVEL: 0-NO PAIN

## 2024-08-12 NOTE — PATIENT INSTRUCTIONS
Avoid fast foods -especially any bread products or french fries. Salad dressings with no sugar are best, or just olive oil and vinegar. Lunch: Try lunch meat fresh veggies instead of bread, and your fruit.   Mediterranean diet is best.    Decrease metoprolol succinate to 1/2 tab daily which is 50 mg daily. Your blood pressure is good, your heart rate is ok. Will see how you do with this lower dose.    Referral to Deshaun: Pharm D. Consideration of changing trulicity to mounjaro.    A1c is 8.1 today.    Keep November appt:    Recommend RSV vaccine September.  Covid booster when new one is out this fall.  Flu shot this October.  Shingrix recommended.

## 2024-09-05 ENCOUNTER — APPOINTMENT (OUTPATIENT)
Dept: PHARMACY | Facility: HOSPITAL | Age: 69
End: 2024-09-05
Payer: MEDICARE

## 2024-09-06 ENCOUNTER — APPOINTMENT (OUTPATIENT)
Dept: PHARMACY | Facility: HOSPITAL | Age: 69
End: 2024-09-06
Payer: MEDICARE

## 2024-09-06 DIAGNOSIS — E11.65 TYPE 2 DIABETES MELLITUS WITH HYPERGLYCEMIA, WITH LONG-TERM CURRENT USE OF INSULIN (MULTI): ICD-10-CM

## 2024-09-06 DIAGNOSIS — Z79.4 TYPE 2 DIABETES MELLITUS WITH HYPERGLYCEMIA, WITH LONG-TERM CURRENT USE OF INSULIN (MULTI): ICD-10-CM

## 2024-09-06 PROCEDURE — RXMED WILLOW AMBULATORY MEDICATION CHARGE

## 2024-09-06 RX ORDER — TIRZEPATIDE 2.5 MG/.5ML
2.5 INJECTION, SOLUTION SUBCUTANEOUS
Qty: 2 ML | Refills: 0 | Status: SHIPPED | OUTPATIENT
Start: 2024-09-06

## 2024-09-06 NOTE — PROGRESS NOTES
Patient is sent at the request of Nat Wasserman MD for my opinion regarding Type 2 diabetes.  My final recommendations will be communicated back to the requesting provider by way of shared medical record.    Subjective     Diabetes  She has type 2 diabetes mellitus. There are no hypoglycemic complications. Risk factors for coronary artery disease include diabetes mellitus, dyslipidemia and hypertension. An ACE inhibitor/angiotensin II receptor blocker is being taken.     Past Medical History:  She has a past medical history of Abnormal finding on breast imaging (08/10/2015), Abnormal levels of other serum enzymes (03/11/2013), Abnormal weight gain (09/23/2019), Acute candidiasis of vulva and vagina (02/01/2017), Acute pain of right knee (03/17/2016), Acute stress reaction (05/01/2019), Acute upper respiratory infection, unspecified (12/07/2017), Adverse effect of insulin and oral hypoglycemic (antidiabetic) drugs, initial encounter (12/08/2019), Allergy status to unspecified drugs, medicaments and biological substances (02/14/2019), Anxiety, Arthritis, Arthritis of knee, right (05/09/2023), Benign and innocent cardiac murmurs, Bilateral primary osteoarthritis of knee (04/27/2016), Body mass index (BMI) 29.0-29.9, adult (02/03/2020), Body mass index (BMI) 29.0-29.9, adult (03/18/2020), Decreased range of motion (ROM) of knee (03/17/2016), Dependent relative needing care at home (06/13/2017), Diaphragmatic hernia without obstruction or gangrene (10/21/2015), Effusion, unspecified knee (06/19/2014), Encounter for general adult medical examination without abnormal findings (02/03/2020), Encounter for immunization (10/20/2016), Encounter for immunization, Encounter for other preprocedural examination (01/31/2016), Eyelid retraction right lower eyelid (05/09/2023), Hematuria (05/09/2023), Hypertension, Impacted cerumen, left ear (06/24/2019), Laxity of eyelid (05/09/2023), Long term (current) use of antibiotics  (04/21/2015), Long term (current) use of insulin (Multi) (07/11/2021), Major depressive disorder, 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 psychotic features (Multi) (04/11/2021), Major depressive disorder, 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 psychotic features (Multi) (07/14/2020), Major depressive disorder, recurrent, moderate (Multi) (02/03/2020), Metformin adverse reaction (05/09/2023), Obstructive sleep apnea (adult) (pediatric) (07/06/2021), Osteoarthritis of right knee (01/23/2015), Other abnormal and inconclusive findings on diagnostic imaging of breast (09/18/2017), Other conditions influencing health status (06/13/2017), Other long term (current) drug therapy (01/02/2022), 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), Other specified abnormal immunological findings in serum (03/11/2013), Other specified counseling (07/22/2020), Other specified counseling (06/25/2020), Other specified disorders of nose and nasal sinuses (01/30/2015), Other specified symptoms and signs involving the circulatory and respiratory systems (07/21/2016), Overweight, Pain in unspecified knee, Pain in unspecified knee (02/21/2014), Papillomavirus as the cause of diseases classified elsewhere, Personal history of diseases of the blood and blood-forming organs and certain disorders involving the immune mechanism, Personal history of diseases of the skin and subcutaneous tissue, Personal history of other diseases of the circulatory system, Personal history of other diseases of the respiratory system (03/15/2018), Personal history of other drug therapy, Personal history of other drug therapy (09/18/2017), Personal history of other endocrine, nutritional and metabolic disease (10/02/2018), Personal history of other mental and behavioral disorders (04/10/2019), Personal history of other specified conditions (01/19/2018), Personal history of other specified conditions (09/23/2019), Personal history of other specified conditions (07/22/2015), Personal  history of other specified conditions (03/17/2017), Personal history of pneumonia (recurrent), Personal history of urinary (tract) infections, Personal history of urinary (tract) infections, Postpartum depression, Problem related to primary support group, unspecified (06/05/2019), Pulmonary hypertension (Multi) (05/09/2023), Rash and other nonspecific skin eruption (06/24/2013), Right leg weakness (03/17/2016), Type 2 diabetes mellitus with hyperglycemia (Multi) (12/21/2020), Unilateral primary osteoarthritis, right knee (01/27/2016), Unspecified exophthalmos (12/08/2019), Urethral caruncle (05/09/2023), Urinary tract infection, Urinary tract infection, site not specified (07/11/2021), Varicella, and Vitamin D deficiency, unspecified (10/05/2022).    Past Surgical History:  She has a past surgical history that includes Colonoscopy (05/29/2012); Esophagogastroduodenoscopy (05/29/2012); Other surgical history (Left, 2016); Total knee arthroplasty (07/22/2015); Other surgical history (02/03/2020); Total knee arthroplasty (04/21/2015); Joint replacement (2014); and Sumpter tooth extraction.    Social History:  She reports that she has never smoked. She has never been exposed to tobacco smoke. She has never used smokeless tobacco. She reports current alcohol use. She reports that she does not use drugs.    Family History:  Family History   Problem Relation Name Age of Onset    Hypertension Mother Lindsay     Transient ischemic attack Mother Lindsay     Diverticulitis Mother Lindsay     Diverticulosis Mother Lindsay     Other (bladder cancer) Mother Lindsay     Hearing loss Mother Lindsay     Hypertension Father aFriha     Diabetes Father Fariha     Diabetes Son Kurtis     Hearing loss Son Kurtis        Allergies:  Ciprofloxacin, Diclofenac sodium, Erythromycin base, and Naproxen    Current diet:  3 meals per day, 1-2 times per week of fast food (was worse over the summer)  Breakfast: cereal with blueberries  Lunch:  turkey sandwich with fruit and yogurt  Dinner: protein, vegetable, and carbohydrate  Snack: 2-3 times per day - was grabbing cookies has now transitioned to wheat thins with cheese or a piece of fruit  Drink: water or diet soda pop (1-2 cans), coffee in the morning    Current exercise:  walking twice a week for 15 minutes midday    The patient does have a known family history of diabetes.    Patient is using: continuous glucose monitor  The patient is currently checking the blood glucose continuously.    Hypoglycemia frequency: rare  Hypoglycemia awareness: Yes     CGM Data  Time in ranges     7 day  Above: 83%  In range: 17%  Low: 0%   14 day  Above: 86%  In range: 14%  Low: 0% 30 day  Above: 79%  In range: 21%  Low: 0% 90 day  Above: 60%  In range: 35%  Low: 0%   Average glucose Time CGM Active   7 days average: 172 mg/dL  14 day average: 171 mg/dL  30 day average: 167 mg/dL  90 day average: 158 mg/dL 14 day  Scans per day: 1  Sensor data captured: 34%      Adverse Effects:   denies     Objective     Last Recorded Vitals:  BP Readings from Last 6 Encounters:   08/12/24 100/52   05/15/24 112/52   03/18/24 109/70   12/12/23 96/52   09/05/23 104/60   05/09/23 110/70        Wt Readings from Last 6 Encounters:   08/12/24 77.1 kg (170 lb)   05/15/24 80.7 kg (178 lb)   03/18/24 78 kg (172 lb)   12/12/23 78.9 kg (174 lb)   09/05/23 77.1 kg (170 lb)   05/09/23 77.1 kg (170 lb)       Diabetes Pharmacotherapy:  Trulicity 4.5 mg once weekly - tolerated Trulicity titration without side effects  Farxiga 10 mg daily  Lantus 24 units at bedtime    Previous DM therapy:  Metformin - diarrhea    Primary/Secondary Prevention   - Statin? Yes  - ACE-I/ARB? Yes  - Aspirin? No    Pertinent PMH Review:  - PMH of Pancreatitis: No  - PMH of Retinopathy: No  - PMH of Urinary Tract Infections: Yes, 3-4 years ago, does not have them often  - PMH of MTC: No    Lab Review  Lab Results   Component Value Date    BILITOT 0.6 05/10/2024    CALCIUM  "9.8 05/10/2024    CO2 29 05/10/2024     05/10/2024    CREATININE 0.82 05/10/2024    GLUCOSE 150 (H) 05/10/2024    ALKPHOS 65 05/10/2024    K 4.5 05/10/2024    PROT 7.1 05/10/2024     05/10/2024    AST 20 05/10/2024    ALT 29 05/10/2024    BUN 20 05/10/2024    ANIONGAP 14 05/10/2024    MG 2.28 09/05/2023    ALBUMIN 4.2 05/10/2024    GFRF 82 09/05/2023     Lab Results   Component Value Date    TRIG 107 12/15/2023    CHOL 133 12/15/2023    LDLCALC 78 12/15/2023    HDL 34.1 12/15/2023     Lab Results   Component Value Date    HGBA1C 8.1 (A) 08/12/2024    HGBA1C 8.4 (A) 05/15/2024    HGBA1C 7.5 (H) 03/14/2024     No components found for: \"UACR\"  The 10-year ASCVD risk score (Elizabeth CAMPA, et al., 2019) is: 14.2%    Values used to calculate the score:      Age: 69 years      Sex: Female      Is Non- : No      Diabetic: Yes      Tobacco smoker: No      Systolic Blood Pressure: 100 mmHg      Is BP treated: Yes      HDL Cholesterol: 34.1 mg/dL      Total Cholesterol: 133 mg/dL    Health Maintenance:   Foot Exam: PCP checks at yearly physical, patient denies cuts or wounds on feet  Eye Exam: 9/2023  Urine Albumin: not available   Influenza Immunization: 11/7/2023  Pneumonia Immunization: up to date    Drug Interactions:  No significant drug interactions identified at this time    Assessment/Plan   Problem List Items Addressed This Visit       Type 2 diabetes mellitus with hyperglycemia, with long-term current use of insulin (Multi)    Relevant Medications    tirzepatide (Mounjaro) 2.5 mg/0.5 mL pen injector    Other Relevant Orders    Follow Up In [facility] - Pharmacy     Patients diabetes needs improvement with most recent A1c of 8.1% (Goal < 7%). Given need for further BG control while patient is maximized on current GLP-1, will transition patient to more potent GLP-1. Cost is currently the same to patient.   Initiate:   Mounjaro 2.5 mg once weekly (after finishing Trulicity " supply)  Continue:   Farxiga 10 mg daily  Lantus 24 units at bedtime  Discontinue:   Trulicity 4.5 mg once weekly (after completing current supply)  Education Provided to Patient: MOA, side effects, and administration of Mounjaro   Follow-up: 10/16/2024 at 9:30 am via phone  PCP Follow-Up: 11/25/2024    Continue all meds under the continuation of care with the referring provider and clinical pharmacy team.

## 2024-09-09 ENCOUNTER — PHARMACY VISIT (OUTPATIENT)
Dept: PHARMACY | Facility: CLINIC | Age: 69
End: 2024-09-09
Payer: MEDICARE

## 2024-09-10 DIAGNOSIS — F32.A ANXIETY AND DEPRESSION: ICD-10-CM

## 2024-09-10 DIAGNOSIS — F41.9 ANXIETY AND DEPRESSION: ICD-10-CM

## 2024-09-10 DIAGNOSIS — I10 PRIMARY HYPERTENSION: ICD-10-CM

## 2024-09-10 RX ORDER — DULOXETIN HYDROCHLORIDE 60 MG/1
60 CAPSULE, DELAYED RELEASE ORAL 2 TIMES DAILY
Qty: 180 CAPSULE | Refills: 0 | Status: SHIPPED | OUTPATIENT
Start: 2024-09-10

## 2024-09-10 RX ORDER — MIRTAZAPINE 30 MG/1
30 TABLET, FILM COATED ORAL NIGHTLY
Qty: 90 TABLET | Refills: 0 | Status: SHIPPED | OUTPATIENT
Start: 2024-09-10

## 2024-09-10 RX ORDER — HYDROCHLOROTHIAZIDE 12.5 MG/1
12.5 TABLET ORAL DAILY
Qty: 90 TABLET | Refills: 3 | Status: SHIPPED | OUTPATIENT
Start: 2024-09-10

## 2024-09-10 NOTE — TELEPHONE ENCOUNTER
Patient states she want the refill to go to her mail service and not the local pharmacy    Please make changes    Refill    hydroCHLOROthiazide (Microzide) 12.5 mg tablet  12.5 mg, Daily           Summary: Take 1 tablet (12.5 mg) by mouth   once daily        [facility]

## 2024-10-04 ENCOUNTER — APPOINTMENT (OUTPATIENT)
Dept: BEHAVIORAL HEALTH | Facility: CLINIC | Age: 69
End: 2024-10-04
Payer: MEDICARE

## 2024-10-04 DIAGNOSIS — F32.A ANXIETY AND DEPRESSION: ICD-10-CM

## 2024-10-04 DIAGNOSIS — F41.9 ANXIETY AND DEPRESSION: ICD-10-CM

## 2024-10-04 PROCEDURE — 1159F MED LIST DOCD IN RCRD: CPT | Performed by: PSYCHIATRY & NEUROLOGY

## 2024-10-04 PROCEDURE — 99214 OFFICE O/P EST MOD 30 MIN: CPT | Performed by: PSYCHIATRY & NEUROLOGY

## 2024-10-04 PROCEDURE — 3051F HG A1C>EQUAL 7.0%<8.0%: CPT | Performed by: PSYCHIATRY & NEUROLOGY

## 2024-10-04 PROCEDURE — 1157F ADVNC CARE PLAN IN RCRD: CPT | Performed by: PSYCHIATRY & NEUROLOGY

## 2024-10-04 PROCEDURE — 1160F RVW MEDS BY RX/DR IN RCRD: CPT | Performed by: PSYCHIATRY & NEUROLOGY

## 2024-10-04 PROCEDURE — 4010F ACE/ARB THERAPY RXD/TAKEN: CPT | Performed by: PSYCHIATRY & NEUROLOGY

## 2024-10-04 RX ORDER — MIRTAZAPINE 30 MG/1
30 TABLET, FILM COATED ORAL NIGHTLY
Qty: 90 TABLET | Refills: 1 | Status: SHIPPED | OUTPATIENT
Start: 2024-10-04 | End: 2025-04-02

## 2024-10-04 RX ORDER — DULOXETIN HYDROCHLORIDE 60 MG/1
60 CAPSULE, DELAYED RELEASE ORAL 2 TIMES DAILY
Qty: 180 CAPSULE | Refills: 1 | Status: SHIPPED | OUTPATIENT
Start: 2024-10-04 | End: 2025-04-02

## 2024-10-04 NOTE — PROGRESS NOTES
"Subjective   Patient ID: Alexia Cho \"Corina" is a 69 y.o. female who presents for No chief complaint on file..    The patient engaged in a telehealth session via Epic audio visual or phone with this provider practicing within the [facility]. The identity of the patient was verified by their date of birth and last four digits of their social security number. The provider demonstrated that confidentially was preserved at their location. The patient was informed that they were responsible for ensuring confidentially was secured at their location. The patient's location was documented for emergency purposes. The patient was informed of the necessary steps that would occur if an emergency was to occur or technology failed during session. The patient did consent to virtual appointment.     HPI: I am doing much better. The patient reports that her family has been doing better.     MSE: The patient is alert, fully oriented, language is intact, and recent and remote memory intact. The patient denies any suicidal or homicidal ideation or plans. The patient presents with no depressive, manic or psychotic symptoms. Thought is logical and clear. No disturbances of judgment or insight are exhibited. No behavioral disturbances are present on examination.        Review of Systems   Neurological:         MSE: The patient is alert, fully oriented, language is intact, and recent and remote memory intact. The patient denies any suicidal or homicidal ideation or plans. The patient presents with no depressive, manic or psychotic symptoms. Thought is logical and clear. No disturbances of judgment or insight are exhibited. No behavioral disturbances are present on examination.     Psychiatric/Behavioral:          MSE: The patient is alert, fully oriented, language is intact, and recent and remote memory intact. The patient denies any suicidal or homicidal ideation or plans. The patient presents with no depressive, manic or " psychotic symptoms. Thought is logical and clear. No disturbances of judgment or insight are exhibited. No behavioral disturbances are present on examination.     All other systems reviewed and are negative.    Psych Review of Symptoms:    ADHD: Patient denied any symptoms.         Anxiety: Patient denied any symptoms.         Developmental and Sensory Concerns: Patient denied any symptoms.         Depressive Symptoms: Patient denied any symptoms.         Disruptive and Conduct Symptoms: Patient denied any symptoms.         Eating / Feeding Concerns: Patient denied any symptoms.         Elimination Symptoms: Patient denied any symptoms.         Manic Symptoms: Patient denied any symptoms.         Obsessive-Compulsive Symptoms: Patient denied any symptoms.         Psychotic Symptoms: Patient denied any symptoms.           Trauma Related Symptoms: Patient denied any symptoms.           Sleep Concerns: Patient denied any symptoms.             Objective   Physical Exam  Constitutional:       Appearance: Normal appearance.   Neurological:      General: No focal deficit present.      Mental Status: She is alert and oriented to person, place, and time. Mental status is at baseline.      Comments: MSE: The patient is alert, fully oriented, language is intact, and recent and remote memory intact. The patient denies any suicidal or homicidal ideation or plans. The patient presents with no depressive, manic or psychotic symptoms. Thought is logical and clear. No disturbances of judgment or insight are exhibited. No behavioral disturbances are present on examination.     Psychiatric:         Mood and Affect: Mood normal.         Behavior: Behavior normal.         Thought Content: Thought content normal.         Judgment: Judgment normal.      Comments: MSE: The patient is alert, fully oriented, language is intact, and recent and remote memory intact. The patient denies any suicidal or homicidal ideation or plans. The patient  presents with no depressive, manic or psychotic symptoms. Thought is logical and clear. No disturbances of judgment or insight are exhibited. No behavioral disturbances are present on examination.           Lab Review:       Assessment/Plan   Psychiatric Risk Assessment  Violence Risk Assessment: none  Acute Risk of Harm to Others is Considered: low   Suicide Risk Assessment: age > 65 yrs old and   Protective Factors against Suicide: adherence to  treatment, fear of suicide, moral objections to suicide, positive family relationships, and sense of responsibility toward family  Acute Risk of Harm to Self is Considered: low    Imminent Risk of Suicide or Serious Self-Injury: Low   Chronic Risk of Suicide of Serious Self-Injury: Low  Risk factors: Age, depression history and   Protective factors: Denies current suicidal ideation, denies history of suicide attempts , willingness to seek help and support , gender, access to a variety of clinical interventions , and receiving and engaged in care for mental, physical, and substance use disorders      Imminent Risk of Violence or Homicide: Low   Risk Factors: No significant risk factors identified on screening  Protective Factors: Lack of known history of harm to others , Lack of known history of violent ideation , and lack of known access to firearms.     Diagnosis: anxiety and depression, obsessive compulsive features and major depression all in substantial remission.     Treatment plan:   The FDA risks, benefits & alternatives to the medications prescribed were explained to you today. You were able to understand & repeat these risks, benefits & alternatives to these prescribed medications. You have agreed to proceed with treatment with the medications discussed based on the conclusion that the benefit outweighs the risks of this treatment regimen: continue duloxetine 60 mg twice daily and mirtazapine 30 mg nightly.     Follow up in March of 2025 or sooner if  needed.

## 2024-10-16 ENCOUNTER — APPOINTMENT (OUTPATIENT)
Dept: PHARMACY | Facility: HOSPITAL | Age: 69
End: 2024-10-16
Payer: MEDICARE

## 2024-10-16 DIAGNOSIS — E11.65 TYPE 2 DIABETES MELLITUS WITH HYPERGLYCEMIA, WITH LONG-TERM CURRENT USE OF INSULIN: ICD-10-CM

## 2024-10-16 DIAGNOSIS — Z79.4 TYPE 2 DIABETES MELLITUS WITH HYPERGLYCEMIA, WITH LONG-TERM CURRENT USE OF INSULIN: ICD-10-CM

## 2024-10-16 PROCEDURE — RXMED WILLOW AMBULATORY MEDICATION CHARGE

## 2024-10-16 RX ORDER — TIRZEPATIDE 5 MG/.5ML
5 INJECTION, SOLUTION SUBCUTANEOUS
Qty: 2 ML | Refills: 1 | Status: SHIPPED | OUTPATIENT
Start: 2024-10-16

## 2024-10-16 NOTE — PROGRESS NOTES
Patient is sent at the request of Nat Wasserman MD for my opinion regarding Type 2 diabetes.  My final recommendations will be communicated back to the requesting provider by way of shared medical record.    Subjective     Diabetes  She has type 2 diabetes mellitus. There are no hypoglycemic complications. Risk factors for coronary artery disease include diabetes mellitus, dyslipidemia and hypertension. An ACE inhibitor/angiotensin II receptor blocker is being taken.     Patient denies side effects. Denies low BG symptoms. Reports that blood sugar is more elevated.     Past Medical History:  She has a past medical history of Abnormal finding on breast imaging (08/10/2015), Abnormal levels of other serum enzymes (03/11/2013), Abnormal weight gain (09/23/2019), Acute candidiasis of vulva and vagina (02/01/2017), Acute pain of right knee (03/17/2016), Acute stress reaction (05/01/2019), Acute upper respiratory infection, unspecified (12/07/2017), Adverse effect of insulin and oral hypoglycemic (antidiabetic) drugs, initial encounter (12/08/2019), Allergy status to unspecified drugs, medicaments and biological substances (02/14/2019), Anxiety, Arthritis, Arthritis of knee, right (05/09/2023), Benign and innocent cardiac murmurs, Bilateral primary osteoarthritis of knee (04/27/2016), Body mass index (BMI) 29.0-29.9, adult (02/03/2020), Body mass index (BMI) 29.0-29.9, adult (03/18/2020), Decreased range of motion (ROM) of knee (03/17/2016), Dependent relative needing care at home (06/13/2017), Diaphragmatic hernia without obstruction or gangrene (10/21/2015), Effusion, unspecified knee (06/19/2014), Encounter for general adult medical examination without abnormal findings (02/03/2020), Encounter for immunization (10/20/2016), Encounter for immunization, Encounter for other preprocedural examination (01/31/2016), Eyelid retraction right lower eyelid (05/09/2023), Hematuria (05/09/2023), Hypertension, Impacted cerumen,  left ear (06/24/2019), Laxity of eyelid (05/09/2023), Long term (current) use of antibiotics (04/21/2015), Long term (current) use of insulin (Multi) (07/11/2021), Major depressive disorder, 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 psychotic features (Multi) (04/11/2021), Major depressive disorder, 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 psychotic features (Multi) (07/14/2020), Major depressive disorder, recurrent, moderate (Multi) (02/03/2020), Metformin adverse reaction (05/09/2023), Obstructive sleep apnea (adult) (pediatric) (07/06/2021), Osteoarthritis of right knee (01/23/2015), Other abnormal and inconclusive findings on diagnostic imaging of breast (09/18/2017), Other conditions influencing health status (06/13/2017), Other long term (current) drug therapy (01/02/2022), 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), Other specified abnormal immunological findings in serum (03/11/2013), Other specified counseling (07/22/2020), Other specified counseling (06/25/2020), Other specified disorders of nose and nasal sinuses (01/30/2015), Other specified symptoms and signs involving the circulatory and respiratory systems (07/21/2016), Overweight, Pain in unspecified knee, Pain in unspecified knee (02/21/2014), Papillomavirus as the cause of diseases classified elsewhere, Personal history of diseases of the blood and blood-forming organs and certain disorders involving the immune mechanism, Personal history of diseases of the skin and subcutaneous tissue, Personal history of other diseases of the circulatory system, Personal history of other diseases of the respiratory system (03/15/2018), Personal history of other drug therapy, Personal history of other drug therapy (09/18/2017), Personal history of other endocrine, nutritional and metabolic disease (10/02/2018), Personal history of other mental and behavioral disorders (04/10/2019), Personal history of other specified conditions (01/19/2018), Personal history of other specified  conditions (09/23/2019), Personal history of other specified conditions (07/22/2015), Personal history of other specified conditions (03/17/2017), Personal history of pneumonia (recurrent), Personal history of urinary (tract) infections, Personal history of urinary (tract) infections, Postpartum depression, Problem related to primary support group, unspecified (06/05/2019), Pulmonary hypertension (Multi) (05/09/2023), Rash and other nonspecific skin eruption (06/24/2013), Right leg weakness (03/17/2016), Type 2 diabetes mellitus with hyperglycemia (Multi) (12/21/2020), Unilateral primary osteoarthritis, right knee (01/27/2016), Unspecified exophthalmos (12/08/2019), Urethral caruncle (05/09/2023), Urinary tract infection, Urinary tract infection, site not specified (07/11/2021), Varicella, and Vitamin D deficiency, unspecified (10/05/2022).    Past Surgical History:  She has a past surgical history that includes Colonoscopy (05/29/2012); Esophagogastroduodenoscopy (05/29/2012); Other surgical history (Left, 2016); Total knee arthroplasty (07/22/2015); Other surgical history (02/03/2020); Total knee arthroplasty (04/21/2015); Joint replacement (2014); and [place] tooth extraction.    Social History:  She reports that she has never smoked. She has never been exposed to tobacco smoke. She has never used smokeless tobacco. She reports current alcohol use. She reports that she does not use drugs.    Family History:  Family History   Problem Relation Name Age of Onset    Hypertension Mother Lindsay     Transient ischemic attack Mother Lindsay     Diverticulitis Mother Lindsay     Diverticulosis Mother Lindsay     Other (bladder cancer) Mother Lindsay     Hearing loss Mother Lindsay     Hypertension Father Fariha     Diabetes Father Fariha     Diabetes Son Kurtis     Hearing loss Son Kurtis        Allergies:  Ciprofloxacin, Diclofenac sodium, Erythromycin base, and Naproxen    Current diet:  3 meals per day, 1-2 times per  week of fast food (was worse over the summer)  Breakfast: cereal with blueberries  Lunch: turkey sandwich with fruit and yogurt  Dinner: protein, vegetable, and carbohydrate  Snack: 2-3 times per day - was grabbing cookies has now transitioned to wheat thins with cheese or a piece of fruit  Drink: water or diet soda pop (1-2 cans), coffee in the morning    Current exercise:  walking twice a week for 15 minutes midday    The patient does have a known family history of diabetes.    Patient is using: continuous glucose monitor  The patient is currently checking the blood glucose continuously.    Hypoglycemia frequency: rare  Hypoglycemia awareness: Yes     CGM Data  Average glucose   7 days average: 204 mg/dL  14 day average: 203 mg/dL  30 day average: 199 mg/dL      CGM Data  Time in ranges     7 day  Above: 83%  In range: 17%  Low: 0%   14 day  Above: 86%  In range: 14%  Low: 0% 30 day  Above: 79%  In range: 21%  Low: 0% 90 day  Above: 60%  In range: 35%  Low: 0%   Average glucose Time CGM Active   7 days average: 172 mg/dL  14 day average: 171 mg/dL  30 day average: 167 mg/dL  90 day average: 158 mg/dL 14 day  Scans per day: 1  Sensor data captured: 34%      Adverse Effects:   denies     Objective     Last Recorded Vitals:  BP Readings from Last 6 Encounters:   08/12/24 100/52   05/15/24 112/52   03/18/24 109/70   12/12/23 96/52   09/05/23 104/60   05/09/23 110/70        Wt Readings from Last 6 Encounters:   08/12/24 77.1 kg (170 lb)   05/15/24 80.7 kg (178 lb)   03/18/24 78 kg (172 lb)   12/12/23 78.9 kg (174 lb)   09/05/23 77.1 kg (170 lb)   05/09/23 77.1 kg (170 lb)       Diabetes Pharmacotherapy:  Mounjaro 2.5 mg once weekly  Farxiga 10 mg daily  Lantus 24 units at bedtime    Previous DM therapy:  Metformin - diarrhea  Trulicity - alternative therapy with Mounjaro    Primary/Secondary Prevention   - Statin? Yes  - ACE-I/ARB? Yes  - Aspirin? No    Pertinent PMH Review:  - PMH of Pancreatitis: No  - PMH of  "Retinopathy: No  - PMH of Urinary Tract Infections: Yes, 3-4 years ago, does not have them often  - PMH of MTC: No    Lab Review  Lab Results   Component Value Date    BILITOT 0.6 05/10/2024    CALCIUM 9.8 05/10/2024    CO2 29 05/10/2024     05/10/2024    CREATININE 0.82 05/10/2024    GLUCOSE 150 (H) 05/10/2024    ALKPHOS 65 05/10/2024    K 4.5 05/10/2024    PROT 7.1 05/10/2024     05/10/2024    AST 20 05/10/2024    ALT 29 05/10/2024    BUN 20 05/10/2024    ANIONGAP 14 05/10/2024    MG 2.28 09/05/2023    ALBUMIN 4.2 05/10/2024    GFRF 82 09/05/2023     Lab Results   Component Value Date    TRIG 107 12/15/2023    CHOL 133 12/15/2023    LDLCALC 78 12/15/2023    HDL 34.1 12/15/2023     Lab Results   Component Value Date    HGBA1C 8.1 (A) 08/12/2024    HGBA1C 8.4 (A) 05/15/2024    HGBA1C 7.5 (H) 03/14/2024     No components found for: \"UACR\"  The 10-year ASCVD risk score (Elizabeth CAMPA, et al., 2019) is: 14.2%    Values used to calculate the score:      Age: 69 years      Sex: Female      Is Non- : No      Diabetic: Yes      Tobacco smoker: No      Systolic Blood Pressure: 100 mmHg      Is BP treated: Yes      HDL Cholesterol: 34.1 mg/dL      Total Cholesterol: 133 mg/dL    Health Maintenance:   Foot Exam: PCP checks at yearly physical, patient denies cuts or wounds on feet  Eye Exam: 9/2023  Urine Albumin: not available   Influenza Immunization: 11/7/2023  Pneumonia Immunization: up to date    Drug Interactions:  No significant drug interactions identified at this time    Assessment/Plan   Problem List Items Addressed This Visit       Type 2 diabetes mellitus with hyperglycemia, with long-term current use of insulin    Relevant Medications    tirzepatide (Mounjaro) 5 mg/0.5 mL pen injector    Other Relevant Orders    Referral to Clinical Pharmacy   Patients diabetes needs improvement with most recent A1c of 8.1% (Goal < 7%). BG are elevated which was to be expected as the patient is " restarting titration with Mounjaro. Will increase dose as patient is tolerating therapy and needs further BG control.   Increase:   Mounjaro 5 mg once weekly   Continue:   Farxiga 10 mg daily  Lantus 24 units at bedtime  Follow-up: 11/20/2024 at 9:30 am via phone  PCP Follow-Up: 11/25/2024    Continue all meds under the continuation of care with the referring provider and clinical pharmacy team.

## 2024-10-21 ENCOUNTER — PHARMACY VISIT (OUTPATIENT)
Dept: PHARMACY | Facility: CLINIC | Age: 69
End: 2024-10-21
Payer: MEDICARE

## 2024-11-04 DIAGNOSIS — Z79.4 TYPE 2 DIABETES MELLITUS TREATED WITH INSULIN (MULTI): ICD-10-CM

## 2024-11-04 DIAGNOSIS — E11.9 TYPE 2 DIABETES MELLITUS TREATED WITH INSULIN (MULTI): ICD-10-CM

## 2024-11-04 DIAGNOSIS — Z79.2 PROPHYLACTIC ANTIBIOTIC: ICD-10-CM

## 2024-11-04 RX ORDER — RAMIPRIL 10 MG/1
10 CAPSULE ORAL EVERY 12 HOURS
Qty: 180 CAPSULE | Refills: 3 | Status: SHIPPED | OUTPATIENT
Start: 2024-11-04

## 2024-11-04 NOTE — TELEPHONE ENCOUNTER
Please send refill  Ramipril 10 mg  [facility]    (Pt called [facility] and they do not have any refills on file.)

## 2024-11-20 ENCOUNTER — APPOINTMENT (OUTPATIENT)
Dept: PHARMACY | Facility: HOSPITAL | Age: 69
End: 2024-11-20
Payer: MEDICARE

## 2024-11-20 DIAGNOSIS — Z79.4 TYPE 2 DIABETES MELLITUS WITH HYPERGLYCEMIA, WITH LONG-TERM CURRENT USE OF INSULIN: ICD-10-CM

## 2024-11-20 DIAGNOSIS — E11.65 TYPE 2 DIABETES MELLITUS WITH HYPERGLYCEMIA, WITH LONG-TERM CURRENT USE OF INSULIN: ICD-10-CM

## 2024-11-20 PROCEDURE — RXMED WILLOW AMBULATORY MEDICATION CHARGE

## 2024-11-20 RX ORDER — TIRZEPATIDE 7.5 MG/.5ML
7.5 INJECTION, SOLUTION SUBCUTANEOUS
Qty: 2 ML | Refills: 5 | Status: SHIPPED | OUTPATIENT
Start: 2024-11-20

## 2024-11-20 NOTE — PROGRESS NOTES
Patient is sent at the request of Nat Wasserman MD for my opinion regarding Type 2 diabetes.  My final recommendations will be communicated back to the requesting provider by way of shared medical record.    Subjective     Diabetes  She has type 2 diabetes mellitus. There are no hypoglycemic complications. Risk factors for coronary artery disease include diabetes mellitus, dyslipidemia and hypertension. An ACE inhibitor/angiotensin II receptor blocker is being taken.     Patient notes BG numbers are starting to decrease. Endorses some constipation. Notes one instance of low BG overnight. Starting to notice appetite suppression in the past week and a half.     Past Medical History:  She has a past medical history of Abnormal finding on breast imaging (08/10/2015), Abnormal levels of other serum enzymes (03/11/2013), Abnormal weight gain (09/23/2019), Acute candidiasis of vulva and vagina (02/01/2017), Acute pain of right knee (03/17/2016), Acute stress reaction (05/01/2019), Acute upper respiratory infection, unspecified (12/07/2017), Adverse effect of insulin and oral hypoglycemic (antidiabetic) drugs, initial encounter (12/08/2019), Allergy status to unspecified drugs, medicaments and biological substances (02/14/2019), Anxiety, Arthritis, Arthritis of knee, right (05/09/2023), Benign and innocent cardiac murmurs, Bilateral primary osteoarthritis of knee (04/27/2016), Body mass index (BMI) 29.0-29.9, adult (02/03/2020), Body mass index (BMI) 29.0-29.9, adult (03/18/2020), Decreased range of motion (ROM) of knee (03/17/2016), Dependent relative needing care at home (06/13/2017), Diaphragmatic hernia without obstruction or gangrene (10/21/2015), Effusion, unspecified knee (06/19/2014), Encounter for general adult medical examination without abnormal findings (02/03/2020), Encounter for immunization (10/20/2016), Encounter for immunization, Encounter for other preprocedural examination (01/31/2016), Eyelid  retraction right lower eyelid (05/09/2023), Hematuria (05/09/2023), Hypertension, Impacted cerumen, left ear (06/24/2019), Laxity of eyelid (05/09/2023), Long term (current) use of antibiotics (04/21/2015), Long term (current) use of insulin (Multi) (07/11/2021), Major depressive disorder, 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 psychotic features (Multi) (04/11/2021), Major depressive disorder, 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 psychotic features (Multi) (07/14/2020), Major depressive disorder, recurrent, moderate (Multi) (02/03/2020), Metformin adverse reaction (05/09/2023), Obstructive sleep apnea (adult) (pediatric) (07/06/2021), Osteoarthritis of right knee (01/23/2015), Other abnormal and inconclusive findings on diagnostic imaging of breast (09/18/2017), Other conditions influencing health status (06/13/2017), Other long term (current) drug therapy (01/02/2022), 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), Other specified abnormal immunological findings in serum (03/11/2013), Other specified counseling (07/22/2020), Other specified counseling (06/25/2020), Other specified disorders of nose and nasal sinuses (01/30/2015), Other specified symptoms and signs involving the circulatory and respiratory systems (07/21/2016), Overweight, Pain in unspecified knee, Pain in unspecified knee (02/21/2014), Papillomavirus as the cause of diseases classified elsewhere, Personal history of diseases of the blood and blood-forming organs and certain disorders involving the immune mechanism, Personal history of diseases of the skin and subcutaneous tissue, Personal history of other diseases of the circulatory system, Personal history of other diseases of the respiratory system (03/15/2018), Personal history of other drug therapy, Personal history of other drug therapy (09/18/2017), Personal history of other endocrine, nutritional and metabolic disease (10/02/2018), Personal history of other mental and behavioral disorders (04/10/2019),  Personal history of other specified conditions (01/19/2018), Personal history of other specified conditions (09/23/2019), Personal history of other specified conditions (07/22/2015), Personal history of other specified conditions (03/17/2017), Personal history of pneumonia (recurrent), Personal history of urinary (tract) infections, Personal history of urinary (tract) infections, Postpartum depression, Problem related to primary support group, unspecified (06/05/2019), Pulmonary hypertension (Multi) (05/09/2023), Rash and other nonspecific skin eruption (06/24/2013), Right leg weakness (03/17/2016), Type 2 diabetes mellitus with hyperglycemia (Multi) (12/21/2020), Unilateral primary osteoarthritis, right knee (01/27/2016), Unspecified exophthalmos (12/08/2019), Urethral caruncle (05/09/2023), Urinary tract infection, Urinary tract infection, site not specified (07/11/2021), Varicella, and Vitamin D deficiency, unspecified (10/05/2022).    Past Surgical History:  She has a past surgical history that includes Colonoscopy (05/29/2012); Esophagogastroduodenoscopy (05/29/2012); Other surgical history (Left, 2016); Total knee arthroplasty (07/22/2015); Other surgical history (02/03/2020); Total knee arthroplasty (04/21/2015); Joint replacement (2014); and [place] tooth extraction.    Social History:  She reports that she has never smoked. She has never been exposed to tobacco smoke. She has never used smokeless tobacco. She reports current alcohol use. She reports that she does not use drugs.    Family History:  Family History   Problem Relation Name Age of Onset    Hypertension Mother Lindsay     Transient ischemic attack Mother Lindsay     Diverticulitis Mother Lindsay     Diverticulosis Mother Lindsay     Other (bladder cancer) Mother Lindsay     Hearing loss Mother Lindsay     Hypertension Father Fariha     Diabetes Father Fariha     Diabetes Son Kurtis     Hearing loss Son Kurtis        Allergies:  Ciprofloxacin,  Diclofenac sodium, Erythromycin base, and Naproxen    Current diet:  3 meals per day, 1-2 times per week of fast food (was worse over the summer)  Breakfast: cereal with blueberries  Lunch: turkey sandwich with fruit and yogurt  Dinner: protein, vegetable, and carbohydrate  Snack: 2-3 times per day - was grabbing cookies has now transitioned to wheat thins with cheese or a piece of fruit  Drink: water or diet soda pop (1-2 cans), coffee in the morning    Current exercise:  walking twice a week for 15 minutes midday    The patient does have a known family history of diabetes.    Patient is using: continuous glucose monitor  The patient is currently checking the blood glucose continuously.    Hypoglycemia frequency: rare  Hypoglycemia awareness: Yes     CGM Data  Time in ranges     7 day  Above: 57%  In range: 43%  Low: 0%   14 day  Above: 56%  In range: 41%  Low: 3%     Average glucose Sensor usage   7 days average: 162 mg/dL  14 day average: 159 mg/dL 14 day  Scans per day: 2  Sensor data captured: 53%     CGM Data  Average glucose   7 days average: 204 mg/dL  14 day average: 203 mg/dL  30 day average: 199 mg/dL      CGM Data  Time in ranges     7 day  Above: 83%  In range: 17%  Low: 0%   14 day  Above: 86%  In range: 14%  Low: 0% 30 day  Above: 79%  In range: 21%  Low: 0% 90 day  Above: 60%  In range: 35%  Low: 0%   Average glucose Time CGM Active   7 days average: 172 mg/dL  14 day average: 171 mg/dL  30 day average: 167 mg/dL  90 day average: 158 mg/dL 14 day  Scans per day: 1  Sensor data captured: 34%      Adverse Effects:   denies     Objective     Last Recorded Vitals:  BP Readings from Last 6 Encounters:   08/12/24 100/52   05/15/24 112/52   03/18/24 109/70   12/12/23 96/52   09/05/23 104/60   05/09/23 110/70        Wt Readings from Last 6 Encounters:   08/12/24 77.1 kg (170 lb)   05/15/24 80.7 kg (178 lb)   03/18/24 78 kg (172 lb)   12/12/23 78.9 kg (174 lb)   09/05/23 77.1 kg (170 lb)   05/09/23 77.1 kg  "(170 lb)       Diabetes Pharmacotherapy:  Mounjaro 5 mg once weekly  Farxiga 10 mg daily  Lantus 24 units at bedtime    Previous DM therapy:  Metformin - diarrhea  Trulicity - alternative therapy with Mounjaro    Primary/Secondary Prevention   - Statin? Yes  - ACE-I/ARB? Yes  - Aspirin? No    Pertinent PMH Review:  - PMH of Pancreatitis: No  - PMH of Retinopathy: No  - PMH of Urinary Tract Infections: Yes, 3-4 years ago, does not have them often  - PMH of MTC: No    Lab Review  Lab Results   Component Value Date    BILITOT 0.6 05/10/2024    CALCIUM 9.8 05/10/2024    CO2 29 05/10/2024     05/10/2024    CREATININE 0.82 05/10/2024    GLUCOSE 150 (H) 05/10/2024    ALKPHOS 65 05/10/2024    K 4.5 05/10/2024    PROT 7.1 05/10/2024     05/10/2024    AST 20 05/10/2024    ALT 29 05/10/2024    BUN 20 05/10/2024    ANIONGAP 14 05/10/2024    MG 2.28 09/05/2023    ALBUMIN 4.2 05/10/2024    GFRF 82 09/05/2023     Lab Results   Component Value Date    TRIG 107 12/15/2023    CHOL 133 12/15/2023    LDLCALC 78 12/15/2023    HDL 34.1 12/15/2023     Lab Results   Component Value Date    HGBA1C 8.1 (A) 08/12/2024    HGBA1C 8.4 (A) 05/15/2024    HGBA1C 7.5 (H) 03/14/2024     No components found for: \"UACR\"  The 10-year ASCVD risk score (Elizabeth CAMPA, et al., 2019) is: 14.2%    Values used to calculate the score:      Age: 69 years      Sex: Female      Is Non- : No      Diabetic: Yes      Tobacco smoker: No      Systolic Blood Pressure: 100 mmHg      Is BP treated: Yes      HDL Cholesterol: 34.1 mg/dL      Total Cholesterol: 133 mg/dL    Health Maintenance:   Foot Exam: PCP checks at yearly physical, patient denies cuts or wounds on feet  Eye Exam: 9/2023  Urine Albumin: not available   Influenza Immunization: 11/7/2023  Pneumonia Immunization: up to date    Drug Interactions:  No significant drug interactions identified at this time    Assessment/Plan   Problem List Items Addressed This Visit       Type 2 " diabetes mellitus with hyperglycemia, with long-term current use of insulin    Relevant Medications    tirzepatide (Mounjaro) 7.5 mg/0.5 mL pen injector    Other Relevant Orders    Referral to Clinical Pharmacy   Patients diabetes needs improvement with most recent A1c of 8.1% (Goal < 7%). As BG remain elevated based on CGM data, will further increase Mounjaro. Of note, data is limited d/t patient not scanning CGM frequently enough, will discuss with patient.   Increase:   Mounjaro 7.5 mg once weekly   Continue:   Farxiga 10 mg daily  Lantus 24 units at bedtime  Scan CGM 4 times daily (morning, twice during the day, and at bedtime)  Follow-up: 11/20/2024 at 9:30 am via phone  PCP Follow-Up: 11/25/2024    Continue all meds under the continuation of care with the referring provider and clinical pharmacy team.

## 2024-11-21 NOTE — PROGRESS NOTES
"Patient ID: Alexia Cho \"Mary\" is a 69 y.o. female who presents for Follow-up (Mary is here for follow up visit, medication review/refills, and she currently has had a cold for about 3 days. Hasn't taken a covid test.)  LAST VISIT PLAN FROM: 08/12/2024:  Patient Instructions:  Avoid fast foods -especially any bread products or french fries. Salad dressings with no sugar are best, or just olive oil and vinegar. Lunch: Try lunch meat fresh veggies instead of bread, and your fruit.   Mediterranean diet is best.  Decrease metoprolol succinate to 1/2 tab daily which is 50 mg daily. Your blood pressure is good, your heart rate is ok. Will see how you do with this lower dose.  Referral to Deshaun: Pharm D. Consideration of changing trulicity to mounjaro.  A1c is 8.1 today.  Keep November appt:  Recommend RSV vaccine September.  Covid booster when new one is out this fall.  Flu shot this October.  Shingrix recommended.  END LAST VISIT PLAN  -------------------------------------------  HPI  Patient is a 69-year-old female who presents today for follow up.    Viral Illness:  Viral illness started on Friday 11/22/2024 with cough.  She states her throat is slightly sore but correlates this with coughing. She states her grandson was also ill.   Intermittent wheezing.  Pressure in bilateral ears.  Denies nausea, diarrhea, or vomiting.  Will obtain swabs today to test for influenza and Covid.  I took samples bilaterally.  Will call patient with results.  Provided patient with recommendations of medication to alleviate symptoms in patient instructions.    Hypertension:  Follow up on cardiovascular conditions:  Cardiac:   Chest pain?No  Palpitations? No  Increased luna?  No  Other:  Resp:   Shortness of breath?No  Wheezing No  Cough No  Other:  Extremities:   Leg or ankle swelling?No   Claudication?No  Other:  Neuro:  DizzinessNo  SyncopeNo   Blurred visionNo  New headaches No  Other:  Medications:Taking them " regularly.Yes  Side effects.No    Hyperlipidemia:  No myalgias, nausea, abdominal pain.  Meds: Taking regularly, no adverse effect. Patient continues on atorvastatin 20 mg nightly.  Labs:  Last LDL direct was 88 mg/dL on 12/15/2023.  LDL goal: <100 mg/dL.    Diabetes:  Type II:  A1c today is 8.5%.  A1c on 08/12/2024 was 8.1%.  Of note, patient was taking the maximum dose of Trulicity at 4.5 mg.  She was switched to Mounjaro but has to start from the beginning.  She is currently taking 7.5 mg injection weekly and follows with Pharm D.  She also takes Lantus 24 units in the evening.  We discussed not increasing her Lantus at this time and will continue to monitor the Mounjaro as the dose increases.   Is taking medications regularly, denies side effects.  Denies hypoglycemia, polyuria, polydipsia.  No new numbness or paresthesias of extremities. No syncope or dizziness. No blurred vision.  Lab Results   Component Value Date    HGBA1C 8.5 (A) 11/25/2024    HGBA1C 8.1 (A) 08/12/2024    HGBA1C 8.4 (A) 05/15/2024     Lab Results   Component Value Date    LDLCALC 78 12/15/2023    CREATININE 0.82 05/10/2024      Lab Results   Component Value Date    GLUCOSE 150 (H) 05/10/2024    CALCIUM 9.8 05/10/2024     05/10/2024    K 4.5 05/10/2024    CO2 29 05/10/2024     05/10/2024    BUN 20 05/10/2024    CREATININE 0.82 05/10/2024      No results found for this or any previous visit (from the past 4464 hours).     SGLT-2 inhibitors (Farxiga 10 mg daily): No hematuria, dysuria, or yeast infections.     GLP-1 agonists (Mounjaro 7.5 mg injection weekly): No nausea, vomiting, abdominal pain and anterior neck pain/swelling.     Continuous Glucose Monitor Data:  Duration (days) %Above Target % In Range %Low     Average glucose Usage rate/scans per day    Set Target Range (low-high)  7 days            14 days   54%  44%  2%  158 mg/dL    30 days    67%  33%    172 mg/dL    90 days   84%  16%    192 mg/dL    Any lows symptomatic?  "No.  She states the lows were while she was sleeping.      Vitamin B12 Deficiency:  Last B12 was 1050 on 12/15/2023.    Vitamin D Deficiency:  Last vitamin D was 60 on 12/15/2023.    Cracked Tooth:  Patient reports a cracked tooth, right lower, 2nd from the back.  She will see a dentist but has to obtain a new dentist, as her previous dentist is no longer practicing.    Mood:  Stable.  Patient continues on Cymbalta 60 mg X2 daily.    Order placed for refill as required.    Orders placed for blood work as required.    Follow up 02/26/2024.    Review of Systems  See ROS in HPI    Current Outpatient Medications   Medication Instructions    amLODIPine (NORVASC) 10 mg, oral, Daily    amoxicillin (AMOXIL) 2,000 mg, oral, As needed    atorvastatin (LIPITOR) 20 mg, oral, Nightly    BD Insulin Syringe Ultra-Fine 0.3 mL 31 gauge x 5/16\" syringe use as directed once daily    cholecalciferol (Vitamin D-3) 50 mcg (2,000 unit) capsule Take by mouth.    cyanocobalamin (Vitamin B-12) 1,000 mcg tablet 1 tablet, Daily    DULoxetine (CYMBALTA) 60 mg, oral, 2 times daily    estradiol (Estrace) 0.01 % (0.1 mg/gram) vaginal cream insert 1/4 gram vaginally daily or as directed and rub SMALL AMOUNT OF CREAM EXTERNALY    Farxiga 10 mg TAKE 1 TABLET EVERY MORNINGBEFORE A MEAL.    FreeStyle Lancets 28 gauge use 1 LANCET to TEST BLOOD SUGAR twice a day    FreeStyle Darren 14 Day Sensor kit APPLY DEVICE TO POSTERIOR ARM AND LEAVE FOR 14 DAYS. USE REMOTE TO SCAN AND REVIEW SUGARS    FreeStyle Lite Strips strip use 1 TEST STRIP to TEST BLOOD SUGAR once daily    hydroCHLOROthiazide (MICROZIDE) 12.5 mg, oral, Daily    Lactobacillus rhamnosus GG (CULTURELLE) 15 billion cell capsule, sprinkle capsule Take by mouth.    Lantus U-100 Insulin 24 Units, subcutaneous, Nightly, Take as directed per insulin instructions.    metoprolol succinate XL (TOPROL XL) 100 mg, oral, Daily, Do not crush or chew.    mirtazapine (REMERON) 30 mg, oral, Nightly    Mounjaro " 7.5 mg, subcutaneous, Every 7 days    psyllium (Metamucil, sugar,) powder Daily RT    ramipril (ALTACE) 10 mg, oral, Every 12 hours    spironolactone (ALDACTONE) 25 mg, oral, Daily     Allergies   Allergen Reactions    Ciprofloxacin Hives    Diclofenac Sodium Unknown    Erythromycin Base Diarrhea    Naproxen Diarrhea     Objective    Results reviewed:   Latest Complete Lab Results:  CBC:   Lab Results   Component Value Date    WBC 7.2 12/15/2023    RBC 4.95 12/15/2023    HGB 15.8 12/15/2023    HCT 46.3 (H) 12/15/2023    MCV 94 12/15/2023    MCH 31.9 12/15/2023    MCHC 34.1 12/15/2023     12/15/2023     CMP:  Lab Results   Component Value Date    GLUCOSE 150 (H) 05/10/2024     05/10/2024    K 4.5 05/10/2024     05/10/2024    CO2 29 05/10/2024    ANIONGAP 14 05/10/2024    BUN 20 05/10/2024    CREATININE 0.82 05/10/2024    GFRF 82 09/05/2023    CALCIUM 9.8 05/10/2024    ALBUMIN 4.2 05/10/2024    ALKPHOS 65 05/10/2024    PROT 7.1 05/10/2024    AST 20 05/10/2024    BILITOT 0.6 05/10/2024    ALT 29 05/10/2024      Lipid:   Lab Results   Component Value Date    CHOL 133 12/15/2023    HDL 34.1 12/15/2023    CHHDL 3.9 12/15/2023    LDLF 82 01/20/2023    VLDL 21 12/15/2023    TRIG 107 12/15/2023     LDL Direct:  Lab Results   Component Value Date    LDLDIRECT 88 12/15/2023      TSH:   Lab Results   Component Value Date    TSH 1.59 12/15/2023      B12:  Lab Results   Component Value Date    SAPVGDYV58 1,050 (H) 12/15/2023     Vitamin D:  Lab Results   Component Value Date    VITD25 60 12/15/2023      HgA1c:   Lab Results   Component Value Date    HGBA1C 8.5 (A) 11/25/2024    KVGLQRLM2E 169 03/14/2024       Physical Exam  Vitals:      9/5/2023     9:08 AM 9/5/2023     9:57 AM 12/12/2023     9:49 AM 3/18/2024    11:56 AM 5/15/2024    10:40 AM 8/12/2024     9:39 AM 11/25/2024    10:15 AM   Vitals   Systolic 98 104 96 109 112 100 113   Diastolic 54 60 52 70 52 52 65   BP Location Left arm Left arm Right arm   "Right arm     Heart Rate 72 72 64 80 60 60 89   Temp       36.9 °C (98.4 °F)   Resp 18  18  18 18 16   Height 1.689 m (5' 6.5\")  1.702 m (5' 7\") 1.702 m (5' 7\") 1.676 m (5' 6\") 1.664 m (5' 5.5\") 1.651 m (5' 5\")   Weight (lb) 170  174 172 178 170 167.6   BMI 27.03 kg/m2  27.25 kg/m2 26.94 kg/m2 28.73 kg/m2 27.86 kg/m2 27.89 kg/m2   BSA (m2) 1.9 m2  1.93 m2 1.92 m2 1.94 m2 1.89 m2 1.87 m2     Patient looks well and is in no obvious distress.  HEENT: Oropharynx is mildly irritated. TMS WNL.  Normocephalic, no facial asymmetry  Eyes: Sclera and conjunctiva are clear.  Neck: No adenopathy cervical (Ant/post/lat), no Supraclavicular nodes.   Thyroid: Mild-to-moderate goiter, bumpy, chronic, stable.   Carotid pulses normal, no carotid bruits.  Lungs : Harsh cough but RR normal. Clear to auscultation anterior, posterior and lateral. No rales, wheezes rhonchi or rubs. Good air exchange.  Heart: RRR. No gallop, click or rub.  History of 1/6 systolic murmur heard URSB and ULSB.  Not heard today.   Abdomen: Bowel sounds normal, No bruits. No pulsatile mass. No hepatosplenomegaly, masses or tenderness. Soft, no guarding.  Vascular:  Posterior tibialis and dorsalis pedis pulses within normal limits bilaterally.   Extremities: No upper extremity edema. No lower extremity edema.   Musculoskeletal: No synovitis of joints seen. No new deformity.  Neuro: CN 2-12 intact. Alert, appropriate.  Ambulates independently.  No gross motor deficit.   No tremors.  Psych: normal mood and affect.  Skin: No rash, bruising petechiae or jaundice.    Alexia \"Mary\" was seen today for follow-up.  Diagnoses and all orders for this visit:  Moderately severe recurrent major depression (Multi) (Primary)  Comments:  stable per pt  Type 2 diabetes mellitus with hyperglycemia, with long-term current use of insulin  Comments:  a1c elevated but sugars improving since starting mounjaro.-trulicity was not working.P: continue same insulin since mounjaro dose " will be increasing  Orders:  -     POCT glycosylated hemoglobin (Hb A1C) manually resulted  -     CBC and Auto Differential; Future  -     Lipid Panel; Future  -     Comprehensive Metabolic Panel; Future  -     TSH with reflex to Free T4 if abnormal; Future  -     Cholesterol, LDL Direct; Future  -     Albumin-Creatinine Ratio, Urine Random; Future  Type 2 diabetes mellitus treated with insulin (Multi)  Comments:  a1c elevated but sugars improving since starting mounjaro.-trulicity was not working.P: continue same insulin since mounjaro dose will be increasing  Orders:  -     FreeStyle Darren 14 Day Sensor kit; APPLY DEVICE TO POSTERIOR ARM AND LEAVE FOR 14 DAYS. USE REMOTE TO SCAN AND REVIEW SUGARS  Hypertension, unspecified type  Comments:  stable continue same medications  Orders:  -     spironolactone (Aldactone) 25 mg tablet; Take 1 tablet (25 mg) by mouth once daily.  URI with cough and congestion  Comments:  flu and covid 19 tests obtained, will contact you with results.  Orders:  -     POCT BinaxNOW Covid-19 Ag Card manually resulted  -     POCT Influenza A/B manually resulted  Otalgia of both ears  Nontraumatic broken or cracked tooth  Comments:  right lower 2nd from back, will see dentist  Primary hypertension  High cholesterol  -     Lipid Panel; Future  -     Comprehensive Metabolic Panel; Future  -     Cholesterol, LDL Direct; Future  Vitamin B12 deficiency (non anemic)  -     Vitamin B12; Future  Vitamin D deficiency  -     Vitamin D 25-Hydroxy,Total (for eval of Vitamin D levels); Future       I am the Internal Medicine physician providing ongoing chronic medical care for this patient, which is managed during and in between office visits.    See patient instructions in wrap up plan, orders and comments for treatment plan.  Patient Instructions:  WE obtained covid and flu tests today and will contact you with results.    Keep  insulin the same. Agree with plan to increase mounjaro.    See Dentist.    If  you have COVID 19:  Covid:   mucinex /guaifenesin can help congestion.  Stay hydrated. Drink plenty of fluids at include at least one beverage similar to gatorade or powerade in addition to water each day.  Tylenol for pain or fever.  Quarantine at home for minimum 5 days and if still symptoms then 10 days. During days 5-10, if no symptoms, you can leave the house as long as you are fully masked with a surgical mask or N95 mask. Cloth masks are not effective.  Go to the hospital if pulse oximetry is 90% or below consistently, or if chest pain, shortness of breath or leg swelling occur.    If you have flu: same as above although quarantine is not required. However you would be considered contagious while still having symptoms that first week.    Follow ups every 3 months, set up with  and one of them should be your wellness visit.    Fasting blood test in December, approximately December 12th.  Instructions for obtaining lab tests:   You do not need a paper requisition when obtaining tests at a  facility. No appointment is needed for lab tests.  For fasting blood tests:  Please do not consume calories for 10-12 hours prior to this blood test. It is ok to drink water, you should be hydrated.  Please do not take vitamins, supplements or thyroid medication before your blood is drawn this day.   Most lab results should be available for your review on the  My Chart portal within 48 hours. Please contact the office if you have not received results within 2 weeks of obtaining the test.      Scribe Attestation  By signing my name below, Jayla REYES Scribe   attest that this documentation has been prepared under the direction and in the presence of Nat Wasserman MD.

## 2024-11-25 ENCOUNTER — PHARMACY VISIT (OUTPATIENT)
Dept: PHARMACY | Facility: CLINIC | Age: 69
End: 2024-11-25
Payer: MEDICARE

## 2024-11-25 ENCOUNTER — APPOINTMENT (OUTPATIENT)
Dept: PRIMARY CARE | Facility: CLINIC | Age: 69
End: 2024-11-25
Payer: MEDICARE

## 2024-11-25 VITALS
DIASTOLIC BLOOD PRESSURE: 65 MMHG | TEMPERATURE: 98.4 F | RESPIRATION RATE: 16 BRPM | BODY MASS INDEX: 27.92 KG/M2 | WEIGHT: 167.6 LBS | OXYGEN SATURATION: 96 % | HEIGHT: 65 IN | SYSTOLIC BLOOD PRESSURE: 113 MMHG | HEART RATE: 89 BPM

## 2024-11-25 DIAGNOSIS — E11.9 TYPE 2 DIABETES MELLITUS TREATED WITH INSULIN (MULTI): ICD-10-CM

## 2024-11-25 DIAGNOSIS — E55.9 VITAMIN D DEFICIENCY: ICD-10-CM

## 2024-11-25 DIAGNOSIS — Z79.4 TYPE 2 DIABETES MELLITUS TREATED WITH INSULIN (MULTI): ICD-10-CM

## 2024-11-25 DIAGNOSIS — E53.8 VITAMIN B12 DEFICIENCY (NON ANEMIC): ICD-10-CM

## 2024-11-25 DIAGNOSIS — Z79.4 TYPE 2 DIABETES MELLITUS WITH HYPERGLYCEMIA, WITH LONG-TERM CURRENT USE OF INSULIN: ICD-10-CM

## 2024-11-25 DIAGNOSIS — E78.00 HIGH CHOLESTEROL: ICD-10-CM

## 2024-11-25 DIAGNOSIS — I10 PRIMARY HYPERTENSION: ICD-10-CM

## 2024-11-25 DIAGNOSIS — J06.9 URI WITH COUGH AND CONGESTION: Primary | ICD-10-CM

## 2024-11-25 DIAGNOSIS — H92.03 OTALGIA OF BOTH EARS: ICD-10-CM

## 2024-11-25 DIAGNOSIS — K03.81 NONTRAUMATIC BROKEN OR CRACKED TOOTH: ICD-10-CM

## 2024-11-25 DIAGNOSIS — F33.2 MODERATELY SEVERE RECURRENT MAJOR DEPRESSION (MULTI): ICD-10-CM

## 2024-11-25 DIAGNOSIS — I10 HYPERTENSION, UNSPECIFIED TYPE: ICD-10-CM

## 2024-11-25 DIAGNOSIS — E11.65 TYPE 2 DIABETES MELLITUS WITH HYPERGLYCEMIA, WITH LONG-TERM CURRENT USE OF INSULIN: ICD-10-CM

## 2024-11-25 LAB
POC BINAX EXPIRATION: NORMAL
POC BINAX NOW COVID SERIAL NUMBER: NORMAL
POC HEMOGLOBIN A1C: 8.5 % (ref 4.2–6.5)
POC RAPID INFLUENZA A: NEGATIVE
POC RAPID INFLUENZA B: NEGATIVE
POC SARS-COV-2 AG BINAX: NORMAL

## 2024-11-25 PROCEDURE — 87811 SARS-COV-2 COVID19 W/OPTIC: CPT | Performed by: INTERNAL MEDICINE

## 2024-11-25 PROCEDURE — 1126F AMNT PAIN NOTED NONE PRSNT: CPT | Performed by: INTERNAL MEDICINE

## 2024-11-25 PROCEDURE — 3008F BODY MASS INDEX DOCD: CPT | Performed by: INTERNAL MEDICINE

## 2024-11-25 PROCEDURE — 4010F ACE/ARB THERAPY RXD/TAKEN: CPT | Performed by: INTERNAL MEDICINE

## 2024-11-25 PROCEDURE — 1160F RVW MEDS BY RX/DR IN RCRD: CPT | Performed by: INTERNAL MEDICINE

## 2024-11-25 PROCEDURE — 1159F MED LIST DOCD IN RCRD: CPT | Performed by: INTERNAL MEDICINE

## 2024-11-25 PROCEDURE — G2211 COMPLEX E/M VISIT ADD ON: HCPCS | Performed by: INTERNAL MEDICINE

## 2024-11-25 PROCEDURE — 83036 HEMOGLOBIN GLYCOSYLATED A1C: CPT | Performed by: INTERNAL MEDICINE

## 2024-11-25 PROCEDURE — 99214 OFFICE O/P EST MOD 30 MIN: CPT | Performed by: INTERNAL MEDICINE

## 2024-11-25 PROCEDURE — 3074F SYST BP LT 130 MM HG: CPT | Performed by: INTERNAL MEDICINE

## 2024-11-25 PROCEDURE — 3051F HG A1C>EQUAL 7.0%<8.0%: CPT | Performed by: INTERNAL MEDICINE

## 2024-11-25 PROCEDURE — 1157F ADVNC CARE PLAN IN RCRD: CPT | Performed by: INTERNAL MEDICINE

## 2024-11-25 PROCEDURE — 3078F DIAST BP <80 MM HG: CPT | Performed by: INTERNAL MEDICINE

## 2024-11-25 PROCEDURE — 87804 INFLUENZA ASSAY W/OPTIC: CPT | Performed by: INTERNAL MEDICINE

## 2024-11-25 RX ORDER — FLASH GLUCOSE SENSOR
KIT MISCELLANEOUS
Qty: 2 EACH | Refills: 11 | Status: SHIPPED | OUTPATIENT
Start: 2024-11-25

## 2024-11-25 RX ORDER — SPIRONOLACTONE 25 MG/1
25 TABLET ORAL DAILY
Qty: 100 TABLET | Refills: 3 | Status: SHIPPED | OUTPATIENT
Start: 2024-11-25

## 2024-11-25 ASSESSMENT — PATIENT HEALTH QUESTIONNAIRE - PHQ9
SUM OF ALL RESPONSES TO PHQ9 QUESTIONS 1 AND 2: 0
2. FEELING DOWN, DEPRESSED OR HOPELESS: NOT AT ALL
1. LITTLE INTEREST OR PLEASURE IN DOING THINGS: NOT AT ALL

## 2024-11-25 ASSESSMENT — PAIN SCALES - GENERAL: PAINLEVEL_OUTOF10: 0-NO PAIN

## 2024-11-25 NOTE — PATIENT INSTRUCTIONS
WE obtained covid and flu tests today and will contact you with results.    Keep  insulin the same. Agree with plan to increase mounjaro.    See Dentist.    If you have COVID 19:  Covid:   mucinex /guaifenesin can help congestion.  Stay hydrated. Drink plenty of fluids at include at least one beverage similar to gatorade or powerade in addition to water each day.  Tylenol for pain or fever.  Quarantine at home for minimum 5 days and if still symptoms then 10 days. During days 5-10, if no symptoms, you can leave the house as long as you are fully masked with a surgical mask or N95 mask. Cloth masks are not effective.  Go to the hospital if pulse oximetry is 90% or below consistently, or if chest pain, shortness of breath or leg swelling occur.    If you have flu: same as above although quarantine is not required. However you would be considered contagious while still having symptoms that first week.    Follow ups every 3 months, set up with  and one of them should be your wellness visit.    Fasting blood test in December, approximately December 12th.  Instructions for obtaining lab tests:   You do not need a paper requisition when obtaining tests at a  facility. No appointment is needed for lab tests.  For fasting blood tests:  Please do not consume calories for 10-12 hours prior to this blood test. It is ok to drink water, you should be hydrated.  Please do not take vitamins, supplements or thyroid medication before your blood is drawn this day.   Most lab results should be available for your review on the  My Chart portal within 48 hours. Please contact the office if you have not received results within 2 weeks of obtaining the test.

## 2024-12-18 ENCOUNTER — APPOINTMENT (OUTPATIENT)
Dept: PHARMACY | Facility: HOSPITAL | Age: 69
End: 2024-12-18
Payer: MEDICARE

## 2024-12-18 DIAGNOSIS — Z79.4 TYPE 2 DIABETES MELLITUS WITH HYPERGLYCEMIA, WITH LONG-TERM CURRENT USE OF INSULIN: ICD-10-CM

## 2024-12-18 DIAGNOSIS — E11.65 TYPE 2 DIABETES MELLITUS WITH HYPERGLYCEMIA, WITH LONG-TERM CURRENT USE OF INSULIN: ICD-10-CM

## 2024-12-18 PROCEDURE — RXMED WILLOW AMBULATORY MEDICATION CHARGE

## 2024-12-18 NOTE — PROGRESS NOTES
Patient is sent at the request of Nat Wasserman MD for my opinion regarding Type 2 diabetes.  My final recommendations will be communicated back to the requesting provider by way of shared medical record.    Subjective     Diabetes  She has type 2 diabetes mellitus. There are no hypoglycemic complications. Risk factors for coronary artery disease include diabetes mellitus, dyslipidemia and hypertension. An ACE inhibitor/angiotensin II receptor blocker is being taken.     Patient reports constipation, occasional mild nausea. Taking metamucil. Reports one incident of BG <70 mg/dL, patient was asymptomatic. Had one additional episode where she was symptomatic.     Patient has been scanning CGM more frequently.     Past Medical History:  She has a past medical history of Abnormal finding on breast imaging (08/10/2015), Abnormal levels of other serum enzymes (03/11/2013), Abnormal weight gain (09/23/2019), Acute candidiasis of vulva and vagina (02/01/2017), Acute pain of right knee (03/17/2016), Acute stress reaction (05/01/2019), Acute upper respiratory infection, unspecified (12/07/2017), Adverse effect of insulin and oral hypoglycemic (antidiabetic) drugs, initial encounter (12/08/2019), Allergy status to unspecified drugs, medicaments and biological substances (02/14/2019), Anxiety, Arthritis, Arthritis of knee, right (05/09/2023), Benign and innocent cardiac murmurs, Bilateral primary osteoarthritis of knee (04/27/2016), Body mass index (BMI) 29.0-29.9, adult (02/03/2020), Body mass index (BMI) 29.0-29.9, adult (03/18/2020), Decreased range of motion (ROM) of knee (03/17/2016), Dependent relative needing care at home (06/13/2017), Diaphragmatic hernia without obstruction or gangrene (10/21/2015), Effusion, unspecified knee (06/19/2014), Encounter for general adult medical examination without abnormal findings (02/03/2020), Encounter for immunization (10/20/2016), Encounter for immunization, Encounter for other  preprocedural examination (01/31/2016), Eyelid retraction right lower eyelid (05/09/2023), Hematuria (05/09/2023), Hypertension, Impacted cerumen, left ear (06/24/2019), Laxity of eyelid (05/09/2023), Long term (current) use of antibiotics (04/21/2015), Long term (current) use of insulin (Multi) (07/11/2021), Major depressive disorder, 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 psychotic features (Multi) (04/11/2021), Major depressive disorder, 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 psychotic features (Multi) (07/14/2020), Major depressive disorder, recurrent, moderate (02/03/2020), Metformin adverse reaction (05/09/2023), Obstructive sleep apnea (adult) (pediatric) (07/06/2021), Osteoarthritis of right knee (01/23/2015), Other abnormal and inconclusive findings on diagnostic imaging of breast (09/18/2017), Other conditions influencing health status (06/13/2017), Other long term (current) drug therapy (01/02/2022), 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), Other specified abnormal immunological findings in serum (03/11/2013), Other specified counseling (07/22/2020), Other specified counseling (06/25/2020), Other specified disorders of nose and nasal sinuses (01/30/2015), Other specified symptoms and signs involving the circulatory and respiratory systems (07/21/2016), Overweight, Pain in unspecified knee, Pain in unspecified knee (02/21/2014), Papillomavirus as the cause of diseases classified elsewhere, Personal history of diseases of the blood and blood-forming organs and certain disorders involving the immune mechanism, Personal history of diseases of the skin and subcutaneous tissue, Personal history of other diseases of the circulatory system, Personal history of other diseases of the respiratory system (03/15/2018), Personal history of other drug therapy, Personal history of other drug therapy (09/18/2017), Personal history of other endocrine, nutritional and metabolic disease (10/02/2018), Personal history of other mental and  behavioral disorders (04/10/2019), Personal history of other specified conditions (01/19/2018), Personal history of other specified conditions (09/23/2019), Personal history of other specified conditions (07/22/2015), Personal history of other specified conditions (03/17/2017), Personal history of pneumonia (recurrent), Personal history of urinary (tract) infections, Personal history of urinary (tract) infections, Postpartum depression, Problem related to primary support group, unspecified (06/05/2019), Pulmonary hypertension (Multi) (05/09/2023), Rash and other nonspecific skin eruption (06/24/2013), Right leg weakness (03/17/2016), Type 2 diabetes mellitus with hyperglycemia (Multi) (12/21/2020), Unilateral primary osteoarthritis, right knee (01/27/2016), Unspecified exophthalmos (12/08/2019), Urethral caruncle (05/09/2023), Urinary tract infection, Urinary tract infection, site not specified (07/11/2021), Varicella, and Vitamin D deficiency, unspecified (10/05/2022).    Past Surgical History:  She has a past surgical history that includes Colonoscopy (05/29/2012); Esophagogastroduodenoscopy (05/29/2012); Other surgical history (Left, 2016); Total knee arthroplasty (07/22/2015); Other surgical history (02/03/2020); Total knee arthroplasty (04/21/2015); Joint replacement (2014); and [place] tooth extraction.    Social History:  She reports that she has never smoked. She has never been exposed to tobacco smoke. She has never used smokeless tobacco. She reports current alcohol use. She reports that she does not use drugs.    Family History:  Family History   Problem Relation Name Age of Onset    Hypertension Mother Lindsay     Transient ischemic attack Mother Lindsay     Diverticulitis Mother Lindsay     Diverticulosis Mother Lindsay     Other (bladder cancer) Mother Lindsay     Hearing loss Mother Lindsay     Hypertension Father Fariha     Diabetes Father Fariha     Diabetes Son Kurtis     Hearing loss Son Kurtis         Allergies:  Ciprofloxacin, Diclofenac sodium, Erythromycin base, and Naproxen    Current diet:  3 meals per day, 1-2 times per week of fast food (was worse over the summer)  Breakfast: cereal with blueberries  Lunch: turkey sandwich with fruit and yogurt  Dinner: protein, vegetable, and carbohydrate  Snack: 2-3 times per day - was grabbing cookies has now transitioned to wheat thins with cheese or a piece of fruit  Drink: water or diet soda pop (1-2 cans), coffee in the morning    Current exercise:  walking twice a week for 15 minutes midday    The patient does have a known family history of diabetes.    Patient is using: continuous glucose monitor  The patient is currently checking the blood glucose continuously.    Hypoglycemia frequency: rare  Hypoglycemia awareness: Yes     CGM Data  Time in ranges     7 day  Above: 43%  In range: 53%  Low: 4%   14 day  Above: 44%  In range: 54%  Low: 2% 30 day  Above: 50%  In range: 49%  Low: 1%    Average glucose Time CGM Active   7 days average: 140 mg/dL  14 day average: 143 mg/dL  30 day average: 152 mg/dL 14 day  Scans per day: 4  Sensor data captured: 84%      CGM Data 11/20/24  Time in ranges     7 day  Above: 57%  In range: 43%  Low: 0%   14 day  Above: 56%  In range: 41%  Low: 3%     Average glucose Sensor usage   7 days average: 162 mg/dL  14 day average: 159 mg/dL 14 day  Scans per day: 2  Sensor data captured: 53%     Adverse Effects:   denies     Objective     Last Recorded Vitals:  BP Readings from Last 6 Encounters:   11/25/24 113/65   08/12/24 100/52   05/15/24 112/52   03/18/24 109/70   12/12/23 96/52   09/05/23 104/60        Wt Readings from Last 6 Encounters:   11/25/24 76 kg (167 lb 9.6 oz)   08/12/24 77.1 kg (170 lb)   05/15/24 80.7 kg (178 lb)   03/18/24 78 kg (172 lb)   12/12/23 78.9 kg (174 lb)   09/05/23 77.1 kg (170 lb)       Diabetes Pharmacotherapy:  Mounjaro 7.5 mg once weekly  Farxiga 10 mg daily  Lantus 24 units at bedtime    Previous DM  "therapy:  Metformin - diarrhea  Trulicity - alternative therapy with Mounjaro    Primary/Secondary Prevention   - Statin? Yes  - ACE-I/ARB? Yes  - Aspirin? No    Pertinent PMH Review:  - PMH of Pancreatitis: No  - PMH of Retinopathy: No  - PMH of Urinary Tract Infections: Yes, 3-4 years ago, does not have them often  - PMH of MTC: No    Lab Review  Lab Results   Component Value Date    BILITOT 0.6 05/10/2024    CALCIUM 9.8 05/10/2024    CO2 29 05/10/2024     05/10/2024    CREATININE 0.82 05/10/2024    GLUCOSE 150 (H) 05/10/2024    ALKPHOS 65 05/10/2024    K 4.5 05/10/2024    PROT 7.1 05/10/2024     05/10/2024    AST 20 05/10/2024    ALT 29 05/10/2024    BUN 20 05/10/2024    ANIONGAP 14 05/10/2024    MG 2.28 09/05/2023    ALBUMIN 4.2 05/10/2024    GFRF 82 09/05/2023     Lab Results   Component Value Date    TRIG 107 12/15/2023    CHOL 133 12/15/2023    LDLCALC 78 12/15/2023    HDL 34.1 12/15/2023     Lab Results   Component Value Date    HGBA1C 8.5 (A) 11/25/2024    HGBA1C 8.1 (A) 08/12/2024    HGBA1C 8.4 (A) 05/15/2024     No components found for: \"UACR\"  The 10-year ASCVD risk score (Elizabeth CAMPA, et al., 2019) is: 17.8%    Values used to calculate the score:      Age: 69 years      Sex: Female      Is Non- : No      Diabetic: Yes      Tobacco smoker: No      Systolic Blood Pressure: 113 mmHg      Is BP treated: Yes      HDL Cholesterol: 34.1 mg/dL      Total Cholesterol: 133 mg/dL    Health Maintenance:   Foot Exam: PCP checks at yearly physical, patient denies cuts or wounds on feet  Eye Exam: 9/2023  Urine Albumin: not available   Influenza Immunization: 11/7/2023  Pneumonia Immunization: up to date    Drug Interactions:  No significant drug interactions identified at this time    Assessment/Plan   Problem List Items Addressed This Visit       Type 2 diabetes mellitus with hyperglycemia, with long-term current use of insulin    Relevant Orders    Referral to Clinical Pharmacy "   Patients diabetes needs improvement with most recent A1c of 8.5% (Goal < 7%). A1c recently elevated but likely d/t switching GLP-1 products. Though, BG trends have improved. As the patient is having side effects that are somewhat impacting day to day life, will continue therapy for 4 more weeks.   Continue   Mounjaro 7.5 mg once weekly   Farxiga 10 mg daily  Lantus 24 units at bedtime  Scan CGM 4 times daily (morning, twice during the day, and at bedtime)  Follow-up: 1/15/2025 at 9:30 am via phone  PCP Follow-Up: 2/26/2025    Continue all meds under the continuation of care with the referring provider and clinical pharmacy team.

## 2024-12-21 ENCOUNTER — PHARMACY VISIT (OUTPATIENT)
Dept: PHARMACY | Facility: CLINIC | Age: 69
End: 2024-12-21
Payer: MEDICARE

## 2025-01-02 DIAGNOSIS — E78.00 HIGH CHOLESTEROL: ICD-10-CM

## 2025-01-02 DIAGNOSIS — E78.5 HYPERLIPIDEMIA DUE TO TYPE 2 DIABETES MELLITUS (MULTI): ICD-10-CM

## 2025-01-02 DIAGNOSIS — E11.69 HYPERLIPIDEMIA DUE TO TYPE 2 DIABETES MELLITUS (MULTI): ICD-10-CM

## 2025-01-03 RX ORDER — ATORVASTATIN CALCIUM 20 MG/1
20 TABLET, FILM COATED ORAL NIGHTLY
Qty: 90 TABLET | Refills: 0 | Status: SHIPPED | OUTPATIENT
Start: 2025-01-03

## 2025-01-15 ENCOUNTER — APPOINTMENT (OUTPATIENT)
Dept: PHARMACY | Facility: HOSPITAL | Age: 70
End: 2025-01-15
Payer: MEDICARE

## 2025-01-15 DIAGNOSIS — Z79.4 TYPE 2 DIABETES MELLITUS WITH HYPERGLYCEMIA, WITH LONG-TERM CURRENT USE OF INSULIN: ICD-10-CM

## 2025-01-15 DIAGNOSIS — E11.65 TYPE 2 DIABETES MELLITUS WITH HYPERGLYCEMIA, WITH LONG-TERM CURRENT USE OF INSULIN: ICD-10-CM

## 2025-01-15 PROCEDURE — RXMED WILLOW AMBULATORY MEDICATION CHARGE

## 2025-01-15 RX ORDER — TIRZEPATIDE 10 MG/.5ML
10 INJECTION, SOLUTION SUBCUTANEOUS
Qty: 2 ML | Refills: 5 | Status: SHIPPED | OUTPATIENT
Start: 2025-01-15

## 2025-01-15 NOTE — PROGRESS NOTES
Patient is sent at the request of Nat Wasserman MD for my opinion regarding Type 2 diabetes.  My final recommendations will be communicated back to the requesting provider by way of shared medical record.    Subjective     Diabetes  She has type 2 diabetes mellitus. There are no hypoglycemic complications. Risk factors for coronary artery disease include diabetes mellitus, dyslipidemia and hypertension. An ACE inhibitor/angiotensin II receptor blocker is being taken.     Patient reports constipation has improved, continuing to use fiber supplements. Denies further issues with nausea. Notes she has appetite suppression but it is more moderate.     Past Medical History:  She has a past medical history of Abnormal finding on breast imaging (08/10/2015), Abnormal levels of other serum enzymes (03/11/2013), Abnormal weight gain (09/23/2019), Acute candidiasis of vulva and vagina (02/01/2017), Acute pain of right knee (03/17/2016), Acute stress reaction (05/01/2019), Acute upper respiratory infection, unspecified (12/07/2017), Adverse effect of insulin and oral hypoglycemic (antidiabetic) drugs, initial encounter (12/08/2019), Allergy status to unspecified drugs, medicaments and biological substances (02/14/2019), Anxiety, Arthritis, Arthritis of knee, right (05/09/2023), Benign and innocent cardiac murmurs, Bilateral primary osteoarthritis of knee (04/27/2016), Body mass index (BMI) 29.0-29.9, adult (02/03/2020), Body mass index (BMI) 29.0-29.9, adult (03/18/2020), Decreased range of motion (ROM) of knee (03/17/2016), Dependent relative needing care at home (06/13/2017), Diaphragmatic hernia without obstruction or gangrene (10/21/2015), Effusion, unspecified knee (06/19/2014), Encounter for general adult medical examination without abnormal findings (02/03/2020), Encounter for immunization (10/20/2016), Encounter for immunization, Encounter for other preprocedural examination (01/31/2016), Eyelid retraction right  lower eyelid (05/09/2023), Hematuria (05/09/2023), Hypertension, Impacted cerumen, left ear (06/24/2019), Laxity of eyelid (05/09/2023), Long term (current) use of antibiotics (04/21/2015), Long term (current) use of insulin (Multi) (07/11/2021), Major depressive disorder, 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 psychotic features (Multi) (04/11/2021), Major depressive disorder, 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 psychotic features (Multi) (07/14/2020), Major depressive disorder, recurrent, moderate (02/03/2020), Metformin adverse reaction (05/09/2023), Obstructive sleep apnea (adult) (pediatric) (07/06/2021), Osteoarthritis of right knee (01/23/2015), Other abnormal and inconclusive findings on diagnostic imaging of breast (09/18/2017), Other conditions influencing health status (06/13/2017), Other long term (current) drug therapy (01/02/2022), 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), Other specified abnormal immunological findings in serum (03/11/2013), Other specified counseling (07/22/2020), Other specified counseling (06/25/2020), Other specified disorders of nose and nasal sinuses (01/30/2015), Other specified symptoms and signs involving the circulatory and respiratory systems (07/21/2016), Overweight, Pain in unspecified knee, Pain in unspecified knee (02/21/2014), Papillomavirus as the cause of diseases classified elsewhere, Personal history of diseases of the blood and blood-forming organs and certain disorders involving the immune mechanism, Personal history of diseases of the skin and subcutaneous tissue, Personal history of other diseases of the circulatory system, Personal history of other diseases of the respiratory system (03/15/2018), Personal history of other drug therapy, Personal history of other drug therapy (09/18/2017), Personal history of other endocrine, nutritional and metabolic disease (10/02/2018), Personal history of other mental and behavioral disorders (04/10/2019), Personal history of other  specified conditions (01/19/2018), Personal history of other specified conditions (09/23/2019), Personal history of other specified conditions (07/22/2015), Personal history of other specified conditions (03/17/2017), Personal history of pneumonia (recurrent), Personal history of urinary (tract) infections, Personal history of urinary (tract) infections, Postpartum depression, Problem related to primary support group, unspecified (06/05/2019), Pulmonary hypertension (Multi) (05/09/2023), Rash and other nonspecific skin eruption (06/24/2013), Right leg weakness (03/17/2016), Type 2 diabetes mellitus with hyperglycemia (Multi) (12/21/2020), Unilateral primary osteoarthritis, right knee (01/27/2016), Unspecified exophthalmos (12/08/2019), Urethral caruncle (05/09/2023), Urinary tract infection, Urinary tract infection, site not specified (07/11/2021), Varicella, and Vitamin D deficiency, unspecified (10/05/2022).    Past Surgical History:  She has a past surgical history that includes Colonoscopy (05/29/2012); Esophagogastroduodenoscopy (05/29/2012); Other surgical history (Left, 2016); Total knee arthroplasty (07/22/2015); Other surgical history (02/03/2020); Total knee arthroplasty (04/21/2015); Joint replacement (2014); and [place] tooth extraction.    Social History:  She reports that she has never smoked. She has never been exposed to tobacco smoke. She has never used smokeless tobacco. She reports current alcohol use. She reports that she does not use drugs.    Family History:  Family History   Problem Relation Name Age of Onset    Hypertension Mother Lindsay     Transient ischemic attack Mother Lindsay     Diverticulitis Mother Lindsay     Diverticulosis Mother Lindsay     Other (bladder cancer) Mother Lindsay     Hearing loss Mother Lindsay     Hypertension Father Fariha     Diabetes Father Fariha     Diabetes Son Kurtis     Hearing loss Son Kurtis        Allergies:  Ciprofloxacin, Diclofenac sodium,  Erythromycin base, and Naproxen    Current diet:  3 meals per day, 1-2 times per week of fast food (was worse over the summer)  Breakfast: cereal with blueberries  Lunch: turkey sandwich with fruit and yogurt  Dinner: protein, vegetable, and carbohydrate  Snack: 2-3 times per day - was grabbing cookies has now transitioned to wheat thins with cheese or a piece of fruit  Drink: water or diet soda pop (1-2 cans), coffee in the morning    Current exercise:  walking twice a week for 15 minutes midday    The patient does have a known family history of diabetes.    Patient is using: continuous glucose monitor  The patient is currently checking the blood glucose continuously.    Hypoglycemia frequency: rare  Hypoglycemia awareness: Yes     CGM Data 1/15/2025  Time in ranges     7 day  Above: 47%  In range: 53%  Low: 0%   14 day  Above: 45%  In range: 55%  Low: 0% 30 day  Above: 46%  In range: 54%  Low: 0%    Average glucose Time CGM Active   7 days average: 151 mg/dL  14 day average: 149 mg/dL  30 day average: 149 mg/dL 14 day  Scans per day: 3  Sensor data captured: 72%      CGM Data 12/18/24  Time in ranges     7 day  Above: 43%  In range: 53%  Low: 4%   14 day  Above: 44%  In range: 54%  Low: 2% 30 day  Above: 50%  In range: 49%  Low: 1%    Average glucose Time CGM Active   7 days average: 140 mg/dL  14 day average: 143 mg/dL  30 day average: 152 mg/dL 14 day  Scans per day: 4  Sensor data captured: 84%      CGM Data 11/20/24  Time in ranges     7 day  Above: 57%  In range: 43%  Low: 0%   14 day  Above: 56%  In range: 41%  Low: 3%     Average glucose Sensor usage   7 days average: 162 mg/dL  14 day average: 159 mg/dL 14 day  Scans per day: 2  Sensor data captured: 53%     Adverse Effects:   denies     Objective     Last Recorded Vitals:  BP Readings from Last 6 Encounters:   11/25/24 113/65   08/12/24 100/52   05/15/24 112/52   03/18/24 109/70   12/12/23 96/52   09/05/23 104/60        Wt Readings from Last 6 Encounters:  "  11/25/24 76 kg (167 lb 9.6 oz)   08/12/24 77.1 kg (170 lb)   05/15/24 80.7 kg (178 lb)   03/18/24 78 kg (172 lb)   12/12/23 78.9 kg (174 lb)   09/05/23 77.1 kg (170 lb)       Diabetes Pharmacotherapy:  Mounjaro 7.5 mg once weekly  Farxiga 10 mg daily  Lantus 24 units at bedtime    Previous DM therapy:  Metformin - diarrhea  Trulicity - alternative therapy with Mounjaro    Primary/Secondary Prevention   - Statin? Yes  - ACE-I/ARB? Yes  - Aspirin? No    Pertinent PMH Review:  - PMH of Pancreatitis: No  - PMH of Retinopathy: No  - PMH of Urinary Tract Infections: Yes, 3-4 years ago, does not have them often  - PMH of MTC: No    Lab Review  Lab Results   Component Value Date    BILITOT 0.6 05/10/2024    CALCIUM 9.8 05/10/2024    CO2 29 05/10/2024     05/10/2024    CREATININE 0.82 05/10/2024    GLUCOSE 150 (H) 05/10/2024    ALKPHOS 65 05/10/2024    K 4.5 05/10/2024    PROT 7.1 05/10/2024     05/10/2024    AST 20 05/10/2024    ALT 29 05/10/2024    BUN 20 05/10/2024    ANIONGAP 14 05/10/2024    MG 2.28 09/05/2023    ALBUMIN 4.2 05/10/2024    GFRF 82 09/05/2023     Lab Results   Component Value Date    TRIG 107 12/15/2023    CHOL 133 12/15/2023    LDLCALC 78 12/15/2023    HDL 34.1 12/15/2023     Lab Results   Component Value Date    HGBA1C 8.5 (A) 11/25/2024    HGBA1C 8.1 (A) 08/12/2024    HGBA1C 8.4 (A) 05/15/2024     No components found for: \"UACR\"  The 10-year ASCVD risk score (Elizabeth CAMPA, et al., 2019) is: 17.8%    Values used to calculate the score:      Age: 69 years      Sex: Female      Is Non- : No      Diabetic: Yes      Tobacco smoker: No      Systolic Blood Pressure: 113 mmHg      Is BP treated: Yes      HDL Cholesterol: 34.1 mg/dL      Total Cholesterol: 133 mg/dL    Health Maintenance:   Foot Exam: PCP checks at yearly physical, patient denies cuts or wounds on feet  Eye Exam: 9/2023  Urine Albumin: not available   Influenza Immunization: 11/7/2023  Pneumonia Immunization: " up to date    Drug Interactions:  No significant drug interactions identified at this time    Assessment/Plan   Problem List Items Addressed This Visit       Type 2 diabetes mellitus with hyperglycemia, with long-term current use of insulin    Relevant Medications    tirzepatide (Mounjaro) 10 mg/0.5 mL pen injector    Other Relevant Orders    Referral to Clinical Pharmacy   Patients diabetes needs improvement with most recent A1c of 8.5% (Goal < 7%). Given reduction in side effects and need for further BG improvement, will increase GLP-1 dose.   Increase   Mounjaro 10 mg once weekly   Continue  Farxiga 10 mg daily  Lantus 24 units at bedtime  Scan CGM 4 times daily (morning, twice during the day, and at bedtime)  Follow-up: 2/12/2025 at 9:00 am via phone  PCP Follow-Up: 2/26/2025    Continue all meds under the continuation of care with the referring provider and clinical pharmacy team.

## 2025-01-17 ENCOUNTER — PHARMACY VISIT (OUTPATIENT)
Dept: PHARMACY | Facility: CLINIC | Age: 70
End: 2025-01-17
Payer: MEDICARE

## 2025-02-12 ENCOUNTER — APPOINTMENT (OUTPATIENT)
Dept: PHARMACY | Facility: HOSPITAL | Age: 70
End: 2025-02-12
Payer: MEDICARE

## 2025-02-12 DIAGNOSIS — Z79.4 TYPE 2 DIABETES MELLITUS WITH HYPERGLYCEMIA, WITH LONG-TERM CURRENT USE OF INSULIN: ICD-10-CM

## 2025-02-12 DIAGNOSIS — E11.65 TYPE 2 DIABETES MELLITUS WITH HYPERGLYCEMIA, WITH LONG-TERM CURRENT USE OF INSULIN: ICD-10-CM

## 2025-02-12 PROCEDURE — RXMED WILLOW AMBULATORY MEDICATION CHARGE

## 2025-02-12 RX ORDER — TIRZEPATIDE 12.5 MG/.5ML
12.5 INJECTION, SOLUTION SUBCUTANEOUS
Qty: 2 ML | Refills: 5 | Status: SHIPPED | OUTPATIENT
Start: 2025-02-12

## 2025-02-12 NOTE — PROGRESS NOTES
Patient is sent at the request of Nat Wasserman MD for my opinion regarding Type 2 diabetes.  My final recommendations will be communicated back to the requesting provider by way of shared medical record.    Subjective     Diabetes  She has type 2 diabetes mellitus. There are no hypoglycemic complications. Risk factors for coronary artery disease include diabetes mellitus, dyslipidemia and hypertension. An ACE inhibitor/angiotensin II receptor blocker is being taken.     Patient reports she had some more pasta this week which she feels may have contributed to higher BG this week. Additionally she made some cookies a few weeks ago.     Patient denies low BG symptoms or BG <70 mg/dL. Patient notes one instance of diarrhea with increased dose of metformin. Otherwise denies side effects.     Past Medical History:  She has a past medical history of Abnormal finding on breast imaging (08/10/2015), Abnormal levels of other serum enzymes (03/11/2013), Abnormal weight gain (09/23/2019), Acute candidiasis of vulva and vagina (02/01/2017), Acute pain of right knee (03/17/2016), Acute stress reaction (05/01/2019), Acute upper respiratory infection, unspecified (12/07/2017), Adverse effect of insulin and oral hypoglycemic (antidiabetic) drugs, initial encounter (12/08/2019), Allergy status to unspecified drugs, medicaments and biological substances (02/14/2019), Anxiety, Arthritis, Arthritis of knee, right (05/09/2023), Benign and innocent cardiac murmurs, Bilateral primary osteoarthritis of knee (04/27/2016), Body mass index (BMI) 29.0-29.9, adult (02/03/2020), Body mass index (BMI) 29.0-29.9, adult (03/18/2020), Decreased range of motion (ROM) of knee (03/17/2016), Dependent relative needing care at home (06/13/2017), Diaphragmatic hernia without obstruction or gangrene (10/21/2015), Effusion, unspecified knee (06/19/2014), Encounter for general adult medical examination without abnormal findings (02/03/2020), Encounter  for immunization (10/20/2016), Encounter for immunization, Encounter for other preprocedural examination (01/31/2016), Eyelid retraction right lower eyelid (05/09/2023), Hematuria (05/09/2023), Hypertension, Impacted cerumen, left ear (06/24/2019), Laxity of eyelid (05/09/2023), Long term (current) use of antibiotics (04/21/2015), Long term (current) use of insulin (Multi) (07/11/2021), Major depressive disorder, 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 psychotic features (Multi) (04/11/2021), Major depressive disorder, 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 psychotic features (Multi) (07/14/2020), Major depressive disorder, recurrent, moderate (02/03/2020), Metformin adverse reaction (05/09/2023), Obstructive sleep apnea (adult) (pediatric) (07/06/2021), Osteoarthritis of right knee (01/23/2015), Other abnormal and inconclusive findings on diagnostic imaging of breast (09/18/2017), Other conditions influencing health status (06/13/2017), Other long term (current) drug therapy (01/02/2022), 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), Other specified abnormal immunological findings in serum (03/11/2013), Other specified counseling (07/22/2020), Other specified counseling (06/25/2020), Other specified disorders of nose and nasal sinuses (01/30/2015), Other specified symptoms and signs involving the circulatory and respiratory systems (07/21/2016), Overweight, Pain in unspecified knee, Pain in unspecified knee (02/21/2014), Papillomavirus as the cause of diseases classified elsewhere, Personal history of diseases of the blood and blood-forming organs and certain disorders involving the immune mechanism, Personal history of diseases of the skin and subcutaneous tissue, Personal history of other diseases of the circulatory system, Personal history of other diseases of the respiratory system (03/15/2018), Personal history of other drug therapy, Personal history of other drug therapy (09/18/2017), Personal history of other endocrine,  nutritional and metabolic disease (10/02/2018), Personal history of other mental and behavioral disorders (04/10/2019), Personal history of other specified conditions (01/19/2018), Personal history of other specified conditions (09/23/2019), Personal history of other specified conditions (07/22/2015), Personal history of other specified conditions (03/17/2017), Personal history of pneumonia (recurrent), Personal history of urinary (tract) infections, Personal history of urinary (tract) infections, Postpartum depression, Problem related to primary support group, unspecified (06/05/2019), Pulmonary hypertension (Multi) (05/09/2023), Rash and other nonspecific skin eruption (06/24/2013), Right leg weakness (03/17/2016), Type 2 diabetes mellitus with hyperglycemia (Multi) (12/21/2020), Unilateral primary osteoarthritis, right knee (01/27/2016), Unspecified exophthalmos (12/08/2019), Urethral caruncle (05/09/2023), Urinary tract infection, Urinary tract infection, site not specified (07/11/2021), Varicella, and Vitamin D deficiency, unspecified (10/05/2022).    Past Surgical History:  She has a past surgical history that includes Colonoscopy (05/29/2012); Esophagogastroduodenoscopy (05/29/2012); Other surgical history (Left, 2016); Total knee arthroplasty (07/22/2015); Other surgical history (02/03/2020); Total knee arthroplasty (04/21/2015); Joint replacement (2014); and [place] tooth extraction.    Social History:  She reports that she has never smoked. She has never been exposed to tobacco smoke. She has never used smokeless tobacco. She reports current alcohol use. She reports that she does not use drugs.    Family History:  Family History   Problem Relation Name Age of Onset    Hypertension Mother Lindsay     Transient ischemic attack Mother Lindsay     Diverticulitis Mother Lindsay     Diverticulosis Mother Lindsay     Other (bladder cancer) Mother Lindsay     Hearing loss Mother Lindsay     Hypertension Father  Fariha     Diabetes Father Fariha     Diabetes Son Kurtis     Hearing loss Son Kurtis        Allergies:  Ciprofloxacin, Diclofenac sodium, Erythromycin base, and Naproxen    Current diet:  3 meals per day, 1-2 times per week of fast food (was worse over the summer)  Breakfast: cereal with blueberries  Lunch: turkey sandwich with fruit and yogurt  Dinner: protein, vegetable, and carbohydrate  Snack: 2-3 times per day - was grabbing cookies has now transitioned to wheat thins with cheese or a piece of fruit  Drink: water or diet soda pop (1-2 cans), coffee in the morning    Current exercise:  walking twice a week for 15 minutes midday    The patient does have a known family history of diabetes.    Patient is using: continuous glucose monitor  The patient is currently checking the blood glucose continuously.    Hypoglycemia frequency: rare  Hypoglycemia awareness: Yes     CGM Data 2/12/2025  Time in ranges  7 day averages   7 day  Above: 60%  In range: 40%  Low: 0%   14 day  Above: 49%  In range: 51%  Low: 0% 30 day  Above: 49%  In range: 51%  Low: 0% 12-6 am 117  6-12 am 171  12-6 pm 165  6-12 pm 162   Average glucose Time CGM Active   7 days average: 153 mg/dL  14 day average: 146 mg/dL  30 day average: 148 mg/dL 14 day  Scans per day: 4  Sensor data captured: 82%      CGM Data 1/15/2025  Time in ranges     7 day  Above: 47%  In range: 53%  Low: 0%   14 day  Above: 45%  In range: 55%  Low: 0% 30 day  Above: 46%  In range: 54%  Low: 0%    Average glucose Time CGM Active   7 days average: 151 mg/dL  14 day average: 149 mg/dL  30 day average: 149 mg/dL 14 day  Scans per day: 3  Sensor data captured: 72%      CGM Data 12/18/24  Time in ranges     7 day  Above: 43%  In range: 53%  Low: 4%   14 day  Above: 44%  In range: 54%  Low: 2% 30 day  Above: 50%  In range: 49%  Low: 1%    Average glucose Time CGM Active   7 days average: 140 mg/dL  14 day average: 143 mg/dL  30 day average: 152 mg/dL 14 day  Scans per day:  "4  Sensor data captured: 84%      CGM Data 11/20/24  Time in ranges     7 day  Above: 57%  In range: 43%  Low: 0%   14 day  Above: 56%  In range: 41%  Low: 3%     Average glucose Sensor usage   7 days average: 162 mg/dL  14 day average: 159 mg/dL 14 day  Scans per day: 2  Sensor data captured: 53%     Adverse Effects:   denies     Objective     Last Recorded Vitals:  BP Readings from Last 6 Encounters:   11/25/24 113/65   08/12/24 100/52   05/15/24 112/52   03/18/24 109/70   12/12/23 96/52   09/05/23 104/60        Wt Readings from Last 6 Encounters:   11/25/24 76 kg (167 lb 9.6 oz)   08/12/24 77.1 kg (170 lb)   05/15/24 80.7 kg (178 lb)   03/18/24 78 kg (172 lb)   12/12/23 78.9 kg (174 lb)   09/05/23 77.1 kg (170 lb)       Diabetes Pharmacotherapy:  Mounjaro 10 mg once weekly  Farxiga 10 mg daily  Lantus 24 units at bedtime    Previous DM therapy:  Metformin - diarrhea  Trulicity - alternative therapy with Mounjaro    Primary/Secondary Prevention   - Statin? Yes  - ACE-I/ARB? Yes  - Aspirin? No    Pertinent PMH Review:  - PMH of Pancreatitis: No  - PMH of Retinopathy: No  - PMH of Urinary Tract Infections: Yes, 3-4 years ago, does not have them often  - PMH of MTC: No    Lab Review  Lab Results   Component Value Date    BILITOT 0.6 05/10/2024    CALCIUM 9.8 05/10/2024    CO2 29 05/10/2024     05/10/2024    CREATININE 0.82 05/10/2024    GLUCOSE 150 (H) 05/10/2024    ALKPHOS 65 05/10/2024    K 4.5 05/10/2024    PROT 7.1 05/10/2024     05/10/2024    AST 20 05/10/2024    ALT 29 05/10/2024    BUN 20 05/10/2024    ANIONGAP 14 05/10/2024    MG 2.28 09/05/2023    ALBUMIN 4.2 05/10/2024    GFRF 82 09/05/2023     Lab Results   Component Value Date    TRIG 107 12/15/2023    CHOL 133 12/15/2023    LDLCALC 78 12/15/2023    HDL 34.1 12/15/2023     Lab Results   Component Value Date    HGBA1C 8.5 (A) 11/25/2024    HGBA1C 8.1 (A) 08/12/2024    HGBA1C 8.4 (A) 05/15/2024     No components found for: \"UACR\"  The 10-year " ASCVD risk score (Elizabeth CAMPA, et al., 2019) is: 17.8%    Values used to calculate the score:      Age: 69 years      Sex: Female      Is Non- : No      Diabetic: Yes      Tobacco smoker: No      Systolic Blood Pressure: 113 mmHg      Is BP treated: Yes      HDL Cholesterol: 34.1 mg/dL      Total Cholesterol: 133 mg/dL    Health Maintenance:   Foot Exam: PCP checks at yearly physical, patient denies cuts or wounds on feet  Eye Exam: 9/2023  Urine Albumin: not available   Influenza Immunization: 11/7/2023  Pneumonia Immunization: up to date    Drug Interactions:  No significant drug interactions identified at this time    Assessment/Plan   Problem List Items Addressed This Visit       Type 2 diabetes mellitus with hyperglycemia, with long-term current use of insulin    Relevant Medications    tirzepatide (Mounjaro) 12.5 mg/0.5 mL pen injector    Other Relevant Orders    Referral to Clinical Pharmacy   Patients diabetes needs improvement with most recent A1c of 8.5% (Goal < 7%). BG have slightly elevated, even with increase to GLP-1, possibly related to diet. Discussed avoiding sugars/carbohydrates. Will increase GLP-1 does given BG and tolerance. Additionally, will increase and transition basal insulin to morning to try and reduce daytime BG numbers.   Increase   Mounjaro 12.5 mg once weekly   Lantus 26 units at morning  Continue  Farxiga 10 mg daily  Scan CGM 4 times daily (morning, twice during the day, and at bedtime)  Follow-up: 3/12/2025 at 9:00 am via phone  PCP Follow-Up: 2/26/2025    Continue all meds under the continuation of care with the referring provider and clinical pharmacy team.

## 2025-02-14 LAB
25(OH)D3+25(OH)D2 SERPL-MCNC: 51 NG/ML (ref 30–100)
ALBUMIN SERPL-MCNC: 4.6 G/DL (ref 3.6–5.1)
ALBUMIN/CREAT UR: 3 MG/G CREAT
ALP SERPL-CCNC: 61 U/L (ref 37–153)
ALT SERPL-CCNC: 22 U/L (ref 6–29)
ANION GAP SERPL CALCULATED.4IONS-SCNC: 11 MMOL/L (CALC) (ref 7–17)
AST SERPL-CCNC: 17 U/L (ref 10–35)
BASOPHILS # BLD AUTO: 39 CELLS/UL (ref 0–200)
BASOPHILS NFR BLD AUTO: 0.5 %
BILIRUB SERPL-MCNC: 0.6 MG/DL (ref 0.2–1.2)
BUN SERPL-MCNC: 22 MG/DL (ref 7–25)
CALCIUM SERPL-MCNC: 10.1 MG/DL (ref 8.6–10.4)
CHLORIDE SERPL-SCNC: 99 MMOL/L (ref 98–110)
CHOLEST SERPL-MCNC: 145 MG/DL
CHOLEST/HDLC SERPL: 3.8 (CALC)
CO2 SERPL-SCNC: 27 MMOL/L (ref 20–32)
CREAT SERPL-MCNC: 0.84 MG/DL (ref 0.6–1)
CREAT UR-MCNC: 79 MG/DL (ref 20–275)
EGFRCR SERPLBLD CKD-EPI 2021: 75 ML/MIN/1.73M2
EOSINOPHIL # BLD AUTO: 203 CELLS/UL (ref 15–500)
EOSINOPHIL NFR BLD AUTO: 2.6 %
ERYTHROCYTE [DISTWIDTH] IN BLOOD BY AUTOMATED COUNT: 12.5 % (ref 11–15)
GLUCOSE SERPL-MCNC: 141 MG/DL (ref 65–99)
HCT VFR BLD AUTO: 48.2 % (ref 35–45)
HDLC SERPL-MCNC: 38 MG/DL
HGB BLD-MCNC: 16.1 G/DL (ref 11.7–15.5)
LDLC SERPL CALC-MCNC: 81 MG/DL (CALC)
LDLC SERPL DIRECT ASSAY-MCNC: 93 MG/DL
LYMPHOCYTES # BLD AUTO: 1927 CELLS/UL (ref 850–3900)
LYMPHOCYTES NFR BLD AUTO: 24.7 %
MCH RBC QN AUTO: 31.2 PG (ref 27–33)
MCHC RBC AUTO-ENTMCNC: 33.4 G/DL (ref 32–36)
MCV RBC AUTO: 93.4 FL (ref 80–100)
MICROALBUMIN UR-MCNC: 0.2 MG/DL
MONOCYTES # BLD AUTO: 757 CELLS/UL (ref 200–950)
MONOCYTES NFR BLD AUTO: 9.7 %
NEUTROPHILS # BLD AUTO: 4875 CELLS/UL (ref 1500–7800)
NEUTROPHILS NFR BLD AUTO: 62.5 %
NONHDLC SERPL-MCNC: 107 MG/DL (CALC)
PLATELET # BLD AUTO: 292 THOUSAND/UL (ref 140–400)
PMV BLD REES-ECKER: 11.8 FL (ref 7.5–12.5)
POTASSIUM SERPL-SCNC: 4.3 MMOL/L (ref 3.5–5.3)
PROT SERPL-MCNC: 7.1 G/DL (ref 6.1–8.1)
RBC # BLD AUTO: 5.16 MILLION/UL (ref 3.8–5.1)
SODIUM SERPL-SCNC: 137 MMOL/L (ref 135–146)
TRIGL SERPL-MCNC: 160 MG/DL
TSH SERPL-ACNC: 1.4 MIU/L (ref 0.4–4.5)
VIT B12 SERPL-MCNC: 990 PG/ML (ref 200–1100)
WBC # BLD AUTO: 7.8 THOUSAND/UL (ref 3.8–10.8)

## 2025-02-17 ENCOUNTER — PHARMACY VISIT (OUTPATIENT)
Dept: PHARMACY | Facility: CLINIC | Age: 70
End: 2025-02-17
Payer: MEDICARE

## 2025-02-17 NOTE — PROGRESS NOTES
"Patient ID: Alexia Cho \"Mary\" is a 69 y.o. female who presents for Follow-up (Pt here for follow up and review on her diabetes treatment. A1C today. Pt also wanted to talk about her medications that are not going to be covered any longer, Lantus and Spironolactone. )    (LAST VISIT PLAN FROM:   Patient Instructions: 11/25/2024:  We obtained covid and flu tests today and will contact you with results.  Keep  insulin the same. Agree with plan to increase mounjaro.  See Dentist.  If you have COVID 19:  Covid:   mucinex /guaifenesin can help congestion.  Stay hydrated. Drink plenty of fluids at include at least one beverage similar to gatorade or powerade in addition to water each day.  Tylenol for pain or fever.  Quarantine at home for minimum 5 days and if still symptoms then 10 days. During days 5-10, if no symptoms, you can leave the house as long as you are fully masked with a surgical mask or N95 mask. Cloth masks are not effective.  Go to the hospital if pulse oximetry is 90% or below consistently, or if chest pain, shortness of breath or leg swelling occur.  If you have flu: same as above although quarantine is not required. However you would be considered contagious while still having symptoms that first week.  Follow ups every 3 months, set up with  and one of them should be your wellness visit.  Fasting blood test in December, approximately December 12th.  Instructions for obtaining lab tests:   You do not need a paper requisition when obtaining tests at a  facility. No appointment is needed for lab tests.  For fasting blood tests:  Please do not consume calories for 10-12 hours prior to this blood test. It is ok to drink water, you should be hydrated.  Please do not take vitamins, supplements or thyroid medication before your blood is drawn this day.   Most lab results should be available for your review on the  My Chart portal within 48 hours. Please contact the office if you have not " received results within 2 weeks of obtaining the test.  END LAST VISIT PLAN)  -------------------------------------------  Diabetes  She has type 2 diabetes mellitus. No MedicAlert identification noted. The initial diagnosis of diabetes was made 12 years ago. Pertinent negatives for hypoglycemia include no confusion, dizziness, headaches, hunger, mood changes, nervousness/anxiousness, pallor, seizures, sleepiness, speech difficulty, sweats or tremors. Pertinent negatives for diabetes include no blurred vision, no chest pain, no fatigue, no foot paresthesias, no foot ulcerations, no polydipsia, no polyphagia, no polyuria, no visual change, no weakness and no weight loss. Pertinent negatives for hypoglycemia complications include no blackouts, no hospitalization, no nocturnal hypoglycemia, no required assistance and no required glucagon injection. Symptoms are stable. Pertinent negatives for diabetic complications include no CVA, heart disease, impotence, nephropathy, peripheral neuropathy, PVD or retinopathy. Risk factors for coronary artery disease include dyslipidemia, family history, hypertension and post-menopausal. Current diabetic treatment includes insulin injections and oral agent (dual therapy). She is compliant with treatment all of the time. She is currently taking insulin pre-breakfast. Insulin injections are given by patient. Rotation sites for injection include the abdominal wall. Her weight is stable. She is following a generally healthy and low salt diet. Meal planning includes avoidance of concentrated sweets and carbohydrate counting. She has not had a previous visit with a dietitian. She participates in exercise three times a week. She monitors blood glucose at home 3-4 x per day. She monitors urine at home <1 x per month. Blood glucose monitoring compliance is excellent. Her home blood glucose trend is decreasing steadily. Her breakfast blood glucose is taken between 6-7 am. Her breakfast blood  glucose range is generally 110-130 mg/dl. Her lunch blood glucose is taken between 12-1 pm. Her lunch blood glucose range is generally 140-180 mg/dl. Her dinner blood glucose is taken between 5-6 pm. Her dinner blood glucose range is generally 140-180 mg/dl. Her bedtime blood glucose is taken between 10-11 pm. Her bedtime blood glucose range is generally 140-180 mg/dl. Her overall blood glucose range is 140-180 mg/dl. She does not see a podiatrist.Eye exam is current.     Patient is a 69-year-old female who presents today for follow up.    We reviewed her recent blood work drawn 02/13/2025 including slightly elevated hemoglobin which I suspect correlates with dehydration, elevated triglycerides but A1c improved to 6.9%.    Hypertension:  Aortic Valve Stenosis:  BP today is 104/54.  Follow up on cardiovascular conditions:  Patient continues on amlodipine 10 mg daily, metoprolol succinate  mg daily, ramipril 10 mg every 12 hours, spironolactone 25 mg daily, and hydrochlorothiazide 12.5 mg daily.  Denies palpitations.  Continue same medications.  Cardiac:   Chest pain?No  Palpitations? No  Increased luna?  No  Other:  Resp:   Shortness of breath?No  Wheezing No  Cough No  Other:  Extremities:   Leg or ankle swelling?No   Claudication?No  Other:  Neuro:  DizzinessNo  SyncopeNo   Blurred visionNo  New headaches No  Other:  Medications:Taking them regularly.Yes  Side effects.No    Hyperlipidemia:  No myalgias, nausea, abdominal pain.  Meds: Taking regularly, no adverse effect.  Patient continues on atorvastatin 20 mg daily.  Labs:  Last LDL direct was 93 mg/dL with triglycerides at 160 mg/dL on 02/13/2025.  LDL goal: <100 mg/dL, unless patient has plaque, than goal would be less than 70 mg/dL.    Diabetes:  Type II:  A1c today is 6.9%.  Glucose on 02/13/2025 was 141 mg/dL.  Is taking medications regularly, denies side effects. Patient continues on Farxiga 10 mg daily, Lantus U-100 insulin 24-hours nightly and  "Mounjaro below.  Denies hypoglycemia, polyuria, polydipsia.  No new numbness or paresthesias of extremities. No syncope or dizziness. No blurred vision.  Lab Results   Component Value Date    HGBA1C 6.9 (A) 02/24/2025    HGBA1C 8.5 (A) 11/25/2024    HGBA1C 8.1 (A) 08/12/2024     Lab Results   Component Value Date    LDLCALC 81 02/13/2025    CREATININE 0.84 02/13/2025      Lab Results   Component Value Date    GLUCOSE 141 (H) 02/13/2025    CALCIUM 10.1 02/13/2025     02/13/2025    K 4.3 02/13/2025    CO2 27 02/13/2025    CL 99 02/13/2025    BUN 22 02/13/2025    CREATININE 0.84 02/13/2025      No results found for this or any previous visit (from the past 4464 hours).     GLP-1 agonists (Mounjaro 12.5 mg injection every 7 days.): No vomiting, abdominal pain and anterior neck pain/swelling.    Patient reports recently starting the increased dose of Mounjaro 12.5 mg injection weekly.  She states that she generally feels nauseous for a couple of days at initial increase of the medication but symptoms resolve.  No low blood glucose reported.  Patient also reports constipation with stools that are \"hard\" sometimes, but generally bowel movements are not frequent.  She was recommended a stool softener by Pharm D as well as adequate hydration.  Advised to increase her Metamucil to 4 or 5 caps daily with at least 8 oz water with gradual increase as follows; 3 caps daily this week, then 4 caps daily next week and can increase to 5 daily if needed.  Also recommended Surfak stool softener which can be used as needed for hard stool. (docusate calcium) or colace (docusate sodium).  Recommended adequate hydration of at least 64 oz daily with most being water.    She was advised to change her Lantus insulin to the AM instead of PM and increase to 26 units up from 24 units by Pharm D.    She states that her insurance sent her a letter and her Lantus and spironolactone will no longer be covered.  We discussed that I will send a " copy of her insurance letter to Deshaun to review for alternate medication. (gave paper copy of letter to Mignon to forward to Deshaun)  She reports that Toujeo, Tresiba and Basaglar Kwik Pen are covered.  Patient follows with Pharm Deshaun HUTCHINS, monthly.    Continuous Glucose Monitor Data:  Duration (days) %Above Target % In Range %Low     Average glucose Usage rate/scans per day    Set Target Range (low-high)  7 days    32%   68%   135 mg/dL           14 days   35%   65%   135 mg/dL    30 days    43%   57%   142 mg/dL    90 days   45%   55%   146 mg/dL    Any lows symptomatic? No.  Nothing unusual about her daily pattern upon review.      Patient reports exercising on a regular basis.     Anxiety:  Depression:  Stable.  Patient continues on Cymbalta 60 mg X2 daily and Remeron 30 mg nightly.  Patient follows with Dr. Phelps, Behavioral Health, with upcoming appointment on 03/17/2025.    We discussed recommended vaccines including tetanus (2026), and RSV vaccine.    Orders placed for prescriptions as required.    Orders placed for non-fasting blood work as required to be completed in August.    Order placed for Mammogram that is due.  Patient will schedule.    Follow up 05/21/2025 for Annual Wellness Exam with pelvic exam.  No Pap test required.    Review of Systems   Constitutional:  Negative for fatigue and weight loss.   Eyes:  Negative for blurred vision.   Cardiovascular:  Negative for chest pain.   Endocrine: Negative for polydipsia, polyphagia and polyuria.   Genitourinary:  Negative for impotence.   Skin:  Negative for pallor.   Neurological:  Negative for dizziness, tremors, seizures, speech difficulty, weakness and headaches.   Psychiatric/Behavioral:  Negative for confusion. The patient is not nervous/anxious.      See ROS in HPI    Current Outpatient Medications   Medication Instructions    amLODIPine (NORVASC) 10 mg, oral, Daily    amoxicillin (AMOXIL) 2,000 mg, oral, As needed    atorvastatin (LIPITOR) 20 mg,  "oral, Nightly    BD Insulin Syringe, half unit, 0.3 mL 31 gauge x 5/16\" syringe USE AS DIRECTED ONCE DAILY    cholecalciferol (Vitamin D-3) 50 mcg (2,000 unit) capsule Take by mouth.    cyanocobalamin (Vitamin B-12) 1,000 mcg tablet 1 tablet, Daily    docusate calcium (SURFAK) 240 mg, oral, Daily PRN    DULoxetine (CYMBALTA) 60 mg, oral, 2 times daily    estradiol (Estrace) 0.01 % (0.1 mg/gram) vaginal cream insert 1/4 gram vaginally daily or as directed and rub SMALL AMOUNT OF CREAM EXTERNALY    Farxiga 10 mg TAKE 1 TABLET EVERY MORNINGBEFORE A MEAL.    FreeStyle Lancets 28 gauge use 1 LANCET to TEST BLOOD SUGAR twice a day    FreeStyle Darren 14 Day Sensor kit APPLY DEVICE TO POSTERIOR ARM AND LEAVE FOR 14 DAYS. USE REMOTE TO SCAN AND REVIEW SUGARS    FreeStyle Lite Strips strip use 1 TEST STRIP to TEST BLOOD SUGAR once daily    hydroCHLOROthiazide (MICROZIDE) 12.5 mg, oral, Daily    Lactobacillus rhamnosus GG (CULTURELLE) 15 billion cell capsule, sprinkle capsule Take by mouth.    Lantus U-100 Insulin 24 Units, subcutaneous, Nightly, Take as directed per insulin instructions.    metoprolol succinate XL (TOPROL XL) 100 mg, oral, Daily, Do not crush or chew.    mirtazapine (REMERON) 30 mg, oral, Nightly    Mounjaro 12.5 mg, subcutaneous, Every 7 days    psyllium (MetamuciL) 0.4 gram capsule 4 or 5 caps with at least 8 oz of water, by mouth daily.    psyllium (Metamucil, sugar,) powder Daily RT    ramipril (ALTACE) 10 mg, oral, Every 12 hours    spironolactone (ALDACTONE) 25 mg, oral, Daily     Allergies   Allergen Reactions    Ciprofloxacin Hives    Diclofenac Sodium Unknown    Erythromycin Base Diarrhea    Naproxen Diarrhea     Objective    The following test results have been reviewed:  Latest Complete Lab Results:  CBC:   Lab Results   Component Value Date    WBC 7.8 02/13/2025    RBC 5.16 (H) 02/13/2025    HGB 16.1 (H) 02/13/2025    HCT 48.2 (H) 02/13/2025    MCV 93.4 02/13/2025    MCH 31.2 02/13/2025    [facility] " "33.4 02/13/2025     02/13/2025    MPV 11.8 02/13/2025     CMP:  Lab Results   Component Value Date    GLUCOSE 141 (H) 02/13/2025     02/13/2025    K 4.3 02/13/2025    CL 99 02/13/2025    CO2 27 02/13/2025    ANIONGAP 11 02/13/2025    BUN 22 02/13/2025    CREATININE 0.84 02/13/2025    GFRF 82 09/05/2023    CALCIUM 10.1 02/13/2025    ALBUMIN 4.6 02/13/2025    ALKPHOS 61 02/13/2025    PROT 7.1 02/13/2025    AST 17 02/13/2025    BILITOT 0.6 02/13/2025    ALT 22 02/13/2025      Lipid:   Lab Results   Component Value Date    CHOL 145 02/13/2025    HDL 38 (L) 02/13/2025    CHHDL 3.8 02/13/2025    LDLF 82 01/20/2023    VLDL 21 12/15/2023    TRIG 160 (H) 02/13/2025     LDL Direct:  Lab Results   Component Value Date    LDLDIRECT 93 02/13/2025      TSH:   Lab Results   Component Value Date    TSH 1.40 02/13/2025      B12:  Lab Results   Component Value Date    FSUZHMOW41 990 02/13/2025     Vitamin D:  Lab Results   Component Value Date    VITD25 51 02/13/2025      HgA1c:   Lab Results   Component Value Date    HGBA1C 6.9 (A) 02/24/2025    WZRQVBPC1J 169 03/14/2024       Physical Exam  Vitals:      12/12/2023     9:49 AM 3/18/2024    11:56 AM 5/15/2024    10:40 AM 8/12/2024     9:39 AM 11/25/2024    10:15 AM 2/24/2025     8:12 AM 3/7/2025    10:24 AM   Vitals   Systolic 96 109 112 100 113 104    Diastolic 52 70 52 52 65 54    BP Location Right arm  Right arm       Heart Rate 64 80 60 60 89 64    Temp     36.9 °C (98.4 °F)     Resp 18  18 18 16 18    Height 1.702 m (5' 7\") 1.702 m (5' 7\") 1.676 m (5' 6\") 1.664 m (5' 5.5\") 1.651 m (5' 5\") 1.651 m (5' 5\") 1.651 m (5' 5\")   Weight (lb) 174 172 178 170 167.6 163 163   BMI 27.25 kg/m2 26.94 kg/m2 28.73 kg/m2 27.86 kg/m2 27.89 kg/m2 27.12 kg/m2 27.12 kg/m2   BSA (m2) 1.93 m2 1.92 m2 1.94 m2 1.89 m2 1.87 m2 1.84 m2 1.84 m2   Visit Report Report Report Report Report Report Report      Patient looks well and is in no obvious distress.  HEENT:   Normocephalic, no facial " "asymmetry  Eyes: Sclera and conjunctiva are clear.  Neck: No adenopathy cervical (Ant/post/lat), no Supraclavicular nodes.   Thyroid:  Thyroid is mildly enlarged with mild-to-moderate goiter, bumpy, and chronic for this patient.  Carotid pulses normal, no carotid bruits.  Lungs : RR normal. Clear to auscultation anterior, posterior and lateral. No rales, wheezes rhonchi or rubs. Good air exchange.  Heart: RRR. No gallop, click or rub.  Patient has a barely heard 1/6 systolic systolic murmur heard URSB and ULSB.   Abdomen: Bowel sounds normal, No bruits. No pulsatile mass. No hepatosplenomegaly, masses or tenderness. Soft, no guarding.  Vascular:  Posterior tibialis and dorsalis pedis pulses are 1+, bilaterally. No femoral bruits noted.  Order placed for Vascular US KEVIN with exercise to be completed within the next few months.  Extremities: No upper extremity edema. No lower extremity edema.   Musculoskeletal: No synovitis of joints seen. No new deformity.  Neuro: CN 2-12 intact. Alert, appropriate.  Ambulates independently.  No gross motor deficit.   No tremors.  Psych: normal mood and affect.  Skin: No rash, bruising petechiae or jaundice.    Diabetic Foot Exam:  Skin is dry on bilateral heels.  No lesions.  DP and PT pulses within normal limits.  Callus: None  Deformities: None.  Monofilament Testing: Normal.    Alexia \"Mary\" was seen today for follow-up.  Diagnoses and all orders for this visit:  Type 2 diabetes mellitus with hyperglycemia, with long-term current use of insulin (Primary)  -     POCT glycosylated hemoglobin (Hb A1C) manually resulted  -     CBC; Future  -     Basic metabolic panel; Future  Aortic valve sclerosis  Hyperlipidemia due to type 2 diabetes mellitus (Multi)  -     atorvastatin (Lipitor) 20 mg tablet; Take 1 tablet (20 mg) by mouth once daily at bedtime.  Hypertension, unspecified type  -     amLODIPine (Norvasc) 10 mg tablet; Take 1 tablet (10 mg) by mouth once daily.  Atrophic " vaginitis  -     estradiol (Estrace) 0.01 % (0.1 mg/gram) vaginal cream; insert 1/4 gram vaginally daily or as directed and rub SMALL AMOUNT OF CREAM EXTERNALY  Hypokalemia  Constipation, unspecified constipation type  -     psyllium (MetamuciL) 0.4 gram capsule; 4 or 5 caps with at least 8 oz of water, by mouth daily.  -     docusate calcium (Surfak) 240 mg capsule; Take 1 capsule (240 mg) by mouth once daily as needed for constipation (hard stool).  Moderately severe recurrent major depression (Multi)  Comments:  stable on medication and has follo wup with dr avelar  Hypertension, essential  Comments:  stable continue same medications  Orders:  -     spironolactone (Aldactone) 25 mg tablet; Take 1 tablet (25 mg) by mouth once daily.  -     CBC; Future  -     Basic metabolic panel; Future  Decreased pulses in feet  -     Vascular US ankle brachial index (KEVNI) with exercise; Future  High cholesterol  -     atorvastatin (Lipitor) 20 mg tablet; Take 1 tablet (20 mg) by mouth once daily at bedtime.  Controlled type 2 diabetes mellitus with hyperglycemia, with long-term current use of insulin (Multi)  Vitamin B12 deficiency (non anemic)  Vitamin D deficiency  Anxiety  Comments:  stable on medication and has mary wilson with dr avelar  Prophylactic antibiotic  -     amoxicillin (Amoxil) 500 mg capsule; Take 4 capsules (2,000 mg) by mouth if needed (one hour before dental visit).  Medication monitoring encounter  -     CBC; Future  -     Basic metabolic panel; Future  Goiter  History of bilateral knee replacement  -     amoxicillin (Amoxil) 500 mg capsule; Take 4 capsules (2,000 mg) by mouth if needed (one hour before dental visit).  Immunization counseling  Visit for screening mammogram  -     BI mammo bilateral screening tomosynthesis; Future  Diabetes mellitus type 2 in nonobese (Multi)  -     Farxiga 10 mg; TAKE 1 TABLET EVERY MORNINGBEFORE A MEAL.         See patient instructions in wrap up plan, orders and comments for  treatment plan.  Patient Instructions:  Constipation related to meds:  Increase metamucil to 4 or 5 caps daily with at least 8 oz water. Use 3 caps daily this week, then 4 caps daily next week and can increase to 5 daily if needed.    Surfak stool softener can also be used as needed for hard stool. (Docusate calcium) or colace (docusate sodium)    Keep up with fluids, at least 64 oz daily and most of that water.    Non fasting blood test in August  to monitor medications and recheck hemoglobin which was slightly elevated likely because you had not had any fluids yet the day of your February blood test.    Follow up as scheduled, next visit in may is your wellness exam with pelvic (not screening pap test).    The spironolactone should be continued.  Will forward your coverage letters to [employer] to see about changing the lantus to a different brand.    Recommend rsv vaccination, one time only. Tetanus booster or tdap due if you get a tetanus prone wound, otherwise will be due routinely in 2026 fyi.    Schedule vascular KEVIN with exercise in the next few months in cardiology downstairs.  Schedule mammogram which is due.  ----------------------       I am the Internal Medicine physician providing ongoing chronic medical care for this patient, which is managed during and in between office visits.  14 orders placed.    Scribe Attestation  By signing my name below, IJayla, Scribe   attest that this documentation has been prepared under the direction and in the presence of Nat Wasserman MD.

## 2025-02-21 ASSESSMENT — ENCOUNTER SYMPTOMS
WEIGHT LOSS: 0
BLURRED VISION: 0
SPEECH DIFFICULTY: 0
SEIZURES: 0
HEADACHES: 0
CONFUSION: 0
DIZZINESS: 0
BLACKOUTS: 0
TREMORS: 0
SWEATS: 0
WEAKNESS: 0
VISUAL CHANGE: 0
FATIGUE: 0
POLYPHAGIA: 0
HUNGER: 0
NERVOUS/ANXIOUS: 0
POLYDIPSIA: 0

## 2025-02-24 ENCOUNTER — APPOINTMENT (OUTPATIENT)
Dept: PRIMARY CARE | Facility: CLINIC | Age: 70
End: 2025-02-24
Payer: MEDICARE

## 2025-02-24 VITALS
DIASTOLIC BLOOD PRESSURE: 54 MMHG | RESPIRATION RATE: 18 BRPM | HEIGHT: 65 IN | HEART RATE: 64 BPM | WEIGHT: 163 LBS | BODY MASS INDEX: 27.16 KG/M2 | SYSTOLIC BLOOD PRESSURE: 104 MMHG

## 2025-02-24 DIAGNOSIS — E78.00 HIGH CHOLESTEROL: ICD-10-CM

## 2025-02-24 DIAGNOSIS — E53.8 VITAMIN B12 DEFICIENCY (NON ANEMIC): ICD-10-CM

## 2025-02-24 DIAGNOSIS — R09.89 DECREASED PULSES IN FEET: ICD-10-CM

## 2025-02-24 DIAGNOSIS — E11.9 DIABETES MELLITUS TYPE 2 IN NONOBESE (MULTI): ICD-10-CM

## 2025-02-24 DIAGNOSIS — Z79.4 CONTROLLED TYPE 2 DIABETES MELLITUS WITH HYPERGLYCEMIA, WITH LONG-TERM CURRENT USE OF INSULIN (MULTI): ICD-10-CM

## 2025-02-24 DIAGNOSIS — F33.2 MODERATELY SEVERE RECURRENT MAJOR DEPRESSION (MULTI): ICD-10-CM

## 2025-02-24 DIAGNOSIS — I35.8 AORTIC VALVE SCLEROSIS: ICD-10-CM

## 2025-02-24 DIAGNOSIS — I10 HYPERTENSION, ESSENTIAL: ICD-10-CM

## 2025-02-24 DIAGNOSIS — N95.2 ATROPHIC VAGINITIS: ICD-10-CM

## 2025-02-24 DIAGNOSIS — E78.5 HYPERLIPIDEMIA DUE TO TYPE 2 DIABETES MELLITUS (MULTI): ICD-10-CM

## 2025-02-24 DIAGNOSIS — Z12.31 VISIT FOR SCREENING MAMMOGRAM: ICD-10-CM

## 2025-02-24 DIAGNOSIS — K59.00 CONSTIPATION, UNSPECIFIED CONSTIPATION TYPE: ICD-10-CM

## 2025-02-24 DIAGNOSIS — I10 HYPERTENSION, UNSPECIFIED TYPE: ICD-10-CM

## 2025-02-24 DIAGNOSIS — E87.6 HYPOKALEMIA: ICD-10-CM

## 2025-02-24 DIAGNOSIS — Z51.81 MEDICATION MONITORING ENCOUNTER: ICD-10-CM

## 2025-02-24 DIAGNOSIS — Z79.2 PROPHYLACTIC ANTIBIOTIC: ICD-10-CM

## 2025-02-24 DIAGNOSIS — Z79.4 TYPE 2 DIABETES MELLITUS WITH HYPERGLYCEMIA, WITH LONG-TERM CURRENT USE OF INSULIN: Primary | ICD-10-CM

## 2025-02-24 DIAGNOSIS — E55.9 VITAMIN D DEFICIENCY: ICD-10-CM

## 2025-02-24 DIAGNOSIS — E11.69 HYPERLIPIDEMIA DUE TO TYPE 2 DIABETES MELLITUS (MULTI): ICD-10-CM

## 2025-02-24 DIAGNOSIS — F41.9 ANXIETY: ICD-10-CM

## 2025-02-24 DIAGNOSIS — E11.65 TYPE 2 DIABETES MELLITUS WITH HYPERGLYCEMIA, WITH LONG-TERM CURRENT USE OF INSULIN: Primary | ICD-10-CM

## 2025-02-24 DIAGNOSIS — Z96.653 HISTORY OF BILATERAL KNEE REPLACEMENT: ICD-10-CM

## 2025-02-24 DIAGNOSIS — Z71.85 IMMUNIZATION COUNSELING: ICD-10-CM

## 2025-02-24 DIAGNOSIS — E11.65 CONTROLLED TYPE 2 DIABETES MELLITUS WITH HYPERGLYCEMIA, WITH LONG-TERM CURRENT USE OF INSULIN (MULTI): ICD-10-CM

## 2025-02-24 DIAGNOSIS — E04.9 GOITER: ICD-10-CM

## 2025-02-24 PROBLEM — R03.1 LOW BLOOD PRESSURE READING: Status: RESOLVED | Noted: 2023-09-05 | Resolved: 2025-02-24

## 2025-02-24 PROBLEM — R63.4 ABNORMAL WEIGHT LOSS: Status: RESOLVED | Noted: 2025-02-24 | Resolved: 2025-02-24

## 2025-02-24 LAB — POC HEMOGLOBIN A1C: 6.9 % (ref 4.2–6.5)

## 2025-02-24 PROCEDURE — 83036 HEMOGLOBIN GLYCOSYLATED A1C: CPT | Performed by: INTERNAL MEDICINE

## 2025-02-24 PROCEDURE — 1159F MED LIST DOCD IN RCRD: CPT | Performed by: INTERNAL MEDICINE

## 2025-02-24 PROCEDURE — 3074F SYST BP LT 130 MM HG: CPT | Performed by: INTERNAL MEDICINE

## 2025-02-24 PROCEDURE — 1123F ACP DISCUSS/DSCN MKR DOCD: CPT | Performed by: INTERNAL MEDICINE

## 2025-02-24 PROCEDURE — G2211 COMPLEX E/M VISIT ADD ON: HCPCS | Performed by: INTERNAL MEDICINE

## 2025-02-24 PROCEDURE — 3078F DIAST BP <80 MM HG: CPT | Performed by: INTERNAL MEDICINE

## 2025-02-24 PROCEDURE — 3008F BODY MASS INDEX DOCD: CPT | Performed by: INTERNAL MEDICINE

## 2025-02-24 PROCEDURE — 1160F RVW MEDS BY RX/DR IN RCRD: CPT | Performed by: INTERNAL MEDICINE

## 2025-02-24 PROCEDURE — 1157F ADVNC CARE PLAN IN RCRD: CPT | Performed by: INTERNAL MEDICINE

## 2025-02-24 PROCEDURE — 1158F ADVNC CARE PLAN TLK DOCD: CPT | Performed by: INTERNAL MEDICINE

## 2025-02-24 PROCEDURE — 1126F AMNT PAIN NOTED NONE PRSNT: CPT | Performed by: INTERNAL MEDICINE

## 2025-02-24 PROCEDURE — 99215 OFFICE O/P EST HI 40 MIN: CPT | Performed by: INTERNAL MEDICINE

## 2025-02-24 PROCEDURE — 4010F ACE/ARB THERAPY RXD/TAKEN: CPT | Performed by: INTERNAL MEDICINE

## 2025-02-24 RX ORDER — DAPAGLIFLOZIN 10 MG/1
TABLET, FILM COATED ORAL
Qty: 90 TABLET | Refills: 3 | Status: SHIPPED | OUTPATIENT
Start: 2025-02-24

## 2025-02-24 RX ORDER — DOCUSATE CALCIUM 240 MG
240 CAPSULE ORAL DAILY PRN
Start: 2025-02-24

## 2025-02-24 RX ORDER — ATORVASTATIN CALCIUM 20 MG/1
20 TABLET, FILM COATED ORAL NIGHTLY
Qty: 90 TABLET | Refills: 3 | Status: SHIPPED | OUTPATIENT
Start: 2025-02-24

## 2025-02-24 RX ORDER — SPIRONOLACTONE 25 MG/1
25 TABLET ORAL DAILY
Qty: 90 TABLET | Refills: 3 | Status: SHIPPED | OUTPATIENT
Start: 2025-02-24

## 2025-02-24 RX ORDER — SYRINGE AND NEEDLE,INSULIN,1ML 28GX1/2"
SYRINGE, EMPTY DISPOSABLE MISCELLANEOUS
COMMUNITY
Start: 2025-02-01

## 2025-02-24 RX ORDER — AMLODIPINE BESYLATE 10 MG/1
10 TABLET ORAL DAILY
Qty: 90 TABLET | Refills: 3 | Status: SHIPPED | OUTPATIENT
Start: 2025-02-24

## 2025-02-24 RX ORDER — ESTRADIOL 0.1 MG/G
CREAM VAGINAL
Qty: 42.5 G | Refills: 3 | Status: SHIPPED | OUTPATIENT
Start: 2025-02-24

## 2025-02-24 RX ORDER — PSYLLIUM HUSK 0.4 G
CAPSULE ORAL
Start: 2025-02-24

## 2025-02-24 RX ORDER — AMOXICILLIN 500 MG/1
2000 CAPSULE ORAL AS NEEDED
Qty: 8 CAPSULE | Refills: 1 | Status: SHIPPED | OUTPATIENT
Start: 2025-02-24

## 2025-02-24 ASSESSMENT — ENCOUNTER SYMPTOMS
WEAKNESS: 0
FATIGUE: 0
BLURRED VISION: 0
WEIGHT LOSS: 0
SWEATS: 0
SEIZURES: 0
CONFUSION: 0
SPEECH DIFFICULTY: 0
NERVOUS/ANXIOUS: 0
POLYDIPSIA: 0
DIZZINESS: 0
HEADACHES: 0
BLACKOUTS: 0
POLYPHAGIA: 0
TREMORS: 0
HUNGER: 0
VISUAL CHANGE: 0

## 2025-02-24 ASSESSMENT — PAIN SCALES - GENERAL: PAINLEVEL_OUTOF10: 0-NO PAIN

## 2025-02-24 NOTE — PATIENT INSTRUCTIONS
Constipation related to meds:  Increase metamucil to 4 or 5 caps daily with at least 8 oz water. Use 3 caps daily this week, then 4 caps daily next week and can increase to 5 daily if needed.    Surfak stool softener can also be used as needed for hard stool. (Docusate calcium) or colace (docusate sodium)    Keep up with fluids, at least 64 oz daily and most of that water.    Non fasting blood test in August  to monitor medications and recheck hemoglobin which was slightly elevated likely because you had not had any fluids yet the day of your February blood test.    Follow up as scheduled, next visit in may is your wellness exam with pelvic (not screening pap test).    The spironolactone should be continued.  Will forward your coverage letters to [employer] to see about changing the lantus to a different brand.    Recommend rsv vaccination, one time only. Tetanus booster or tdap due if you get a tetanus prone wound, otherwise will be due routinely in 2026 fyi.    Schedule vascular KEVIN with exercise in the next few months in cardiology downstairs.  Schedule mammogram which is due.

## 2025-02-26 ENCOUNTER — APPOINTMENT (OUTPATIENT)
Dept: PRIMARY CARE | Facility: CLINIC | Age: 70
End: 2025-02-26
Payer: MEDICARE

## 2025-03-05 DIAGNOSIS — I10 HYPERTENSION, UNSPECIFIED TYPE: ICD-10-CM

## 2025-03-07 ENCOUNTER — HOSPITAL ENCOUNTER (OUTPATIENT)
Dept: RADIOLOGY | Facility: CLINIC | Age: 70
Discharge: HOME | End: 2025-03-07
Payer: MEDICARE

## 2025-03-07 VITALS — BODY MASS INDEX: 27.16 KG/M2 | HEIGHT: 65 IN | WEIGHT: 163 LBS

## 2025-03-07 DIAGNOSIS — Z12.31 VISIT FOR SCREENING MAMMOGRAM: ICD-10-CM

## 2025-03-07 PROCEDURE — 77067 SCR MAMMO BI INCL CAD: CPT

## 2025-03-09 RX ORDER — METOPROLOL SUCCINATE 100 MG/1
100 TABLET, EXTENDED RELEASE ORAL DAILY
Qty: 100 TABLET | Refills: 3 | Status: SHIPPED | OUTPATIENT
Start: 2025-03-09

## 2025-03-12 ENCOUNTER — APPOINTMENT (OUTPATIENT)
Dept: PHARMACY | Facility: HOSPITAL | Age: 70
End: 2025-03-12
Payer: MEDICARE

## 2025-03-12 DIAGNOSIS — E11.65 TYPE 2 DIABETES MELLITUS WITH HYPERGLYCEMIA, WITH LONG-TERM CURRENT USE OF INSULIN: ICD-10-CM

## 2025-03-12 DIAGNOSIS — Z12.39 ENCOUNTER FOR SCREENING FOR MALIGNANT NEOPLASM OF BREAST, UNSPECIFIED SCREENING MODALITY: Primary | ICD-10-CM

## 2025-03-12 DIAGNOSIS — Z79.4 TYPE 2 DIABETES MELLITUS WITH HYPERGLYCEMIA, WITH LONG-TERM CURRENT USE OF INSULIN: ICD-10-CM

## 2025-03-12 PROCEDURE — RXMED WILLOW AMBULATORY MEDICATION CHARGE

## 2025-03-12 NOTE — PROGRESS NOTES
Patient is sent at the request of Nat Wasserman MD for my opinion regarding Type 2 diabetes.  My final recommendations will be communicated back to the requesting provider by way of shared medical record.    Subjective     Diabetes  She has type 2 diabetes mellitus. There are no hypoglycemic complications. Risk factors for coronary artery disease include diabetes mellitus, dyslipidemia and hypertension. An ACE inhibitor/angiotensin II receptor blocker is being taken.     Patient reports some constipation, taking fiber supplement but she doesn't feel it makes a big difference. Was having a BM every day and now BM occurring every 3-4 days. Also notes some nausea that is bearable.     Reports two incidence of low BG. One occurred in the morning and one occurred in the afternoon. Reports she skipped a meal on the day she was symptomatic.     Past Medical History:  She has a past medical history of Abnormal finding on breast imaging (08/10/2015), Abnormal levels of other serum enzymes (03/11/2013), Abnormal weight gain (09/23/2019), Abnormal weight loss (02/24/2025), Acute candidiasis of vulva and vagina (02/01/2017), Acute pain of right knee (03/17/2016), Acute stress reaction (05/01/2019), Acute upper respiratory infection, unspecified (12/07/2017), Adverse effect of insulin and oral hypoglycemic (antidiabetic) drugs, initial encounter (12/08/2019), Allergy status to unspecified drugs, medicaments and biological substances (02/14/2019), Anxiety, Arthritis, Arthritis of knee, right (05/09/2023), Benign and innocent cardiac murmurs, Bilateral primary osteoarthritis of knee (04/27/2016), Body mass index (BMI) 29.0-29.9, adult (02/03/2020), Body mass index (BMI) 29.0-29.9, adult (03/18/2020), Decreased range of motion (ROM) of knee (03/17/2016), Dependent relative needing care at home (06/13/2017), Diaphragmatic hernia without obstruction or gangrene (10/21/2015), Effusion, unspecified knee (06/19/2014), Encounter for  general adult medical examination without abnormal findings (02/03/2020), Encounter for immunization (10/20/2016), Encounter for immunization, Encounter for other preprocedural examination (01/31/2016), Eyelid retraction right lower eyelid (05/09/2023), Hematuria (05/09/2023), Hypertension, Impacted cerumen, left ear (06/24/2019), Laxity of eyelid (05/09/2023), Long term (current) use of antibiotics (04/21/2015), Long term (current) use of insulin (Multi) (07/11/2021), Major depressive disorder, 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 psychotic features (Multi) (04/11/2021), Major depressive disorder, 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 psychotic features (Multi) (07/14/2020), Major depressive disorder, recurrent, moderate (02/03/2020), Metformin adverse reaction (05/09/2023), Obstructive sleep apnea (adult) (pediatric) (07/06/2021), Osteoarthritis of right knee (01/23/2015), Other abnormal and inconclusive findings on diagnostic imaging of breast (09/18/2017), Other conditions influencing health status (06/13/2017), Other long term (current) drug therapy (01/02/2022), 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), Other specified abnormal immunological findings in serum (03/11/2013), Other specified counseling (07/22/2020), Other specified counseling (06/25/2020), Other specified disorders of nose and nasal sinuses (01/30/2015), Other specified symptoms and signs involving the circulatory and respiratory systems (07/21/2016), Overweight, Pain in unspecified knee, Pain in unspecified knee (02/21/2014), Papillomavirus as the cause of diseases classified elsewhere, Personal history of diseases of the blood and blood-forming organs and certain disorders involving the immune mechanism, Personal history of diseases of the skin and subcutaneous tissue, Personal history of other diseases of the circulatory system, Personal history of other diseases of the respiratory system (03/15/2018), Personal history of other drug therapy, Personal  history of other drug therapy (09/18/2017), Personal history of other endocrine, nutritional and metabolic disease (10/02/2018), Personal history of other mental and behavioral disorders (04/10/2019), Personal history of other specified conditions (01/19/2018), Personal history of other specified conditions (09/23/2019), Personal history of other specified conditions (07/22/2015), Personal history of other specified conditions (03/17/2017), Personal history of pneumonia (recurrent), Personal history of urinary (tract) infections, Personal history of urinary (tract) infections, Postpartum depression, Problem related to primary support group, unspecified (06/05/2019), Pulmonary hypertension (Multi) (05/09/2023), Rash and other nonspecific skin eruption (06/24/2013), Right leg weakness (03/17/2016), Type 2 diabetes mellitus with hyperglycemia (Multi) (12/21/2020), Type 2 diabetes mellitus with hyperglycemia, with long-term current use of insulin (01/23/2015), Unilateral primary osteoarthritis, right knee (01/27/2016), Unspecified exophthalmos (12/08/2019), Urethral caruncle (05/09/2023), Urinary tract infection, Urinary tract infection, site not specified (07/11/2021), Varicella, and Vitamin D deficiency, unspecified (10/05/2022).    Past Surgical History:  She has a past surgical history that includes Colonoscopy (05/29/2012); Esophagogastroduodenoscopy (05/29/2012); Other surgical history (Left, 2016); Total knee arthroplasty (07/22/2015); Other surgical history (02/03/2020); Total knee arthroplasty (04/21/2015); Joint replacement (2014); and Bogart tooth extraction.    Social History:  She reports that she has never smoked. She has never been exposed to tobacco smoke. She has never used smokeless tobacco. She reports current alcohol use. She reports that she does not use drugs.    Family History:  Family History   Problem Relation Name Age of Onset    Hypertension Mother Lindsay     Transient ischemic attack Mother  Lindsay     Diverticulitis Mother Lindsay     Diverticulosis Mother Lindsay     Other (bladder cancer) Mother Lindsay     Hearing loss Mother Lindsay     Hypertension Father Fariha     Diabetes Father Fariha     Diabetes Son Kurtis     Hearing loss Son Kurtis        Allergies:  Ciprofloxacin, Diclofenac sodium, Erythromycin base, and Naproxen    Current diet:  3 meals per day, 1-2 times per week of fast food (was worse over the summer)  Breakfast: cereal with blueberries  Lunch: turkey sandwich with fruit and yogurt  Dinner: protein, vegetable, and carbohydrate  Snack: 2-3 times per day - was grabbing cookies has now transitioned to wheat thins with cheese or a piece of fruit  Drink: water or diet soda pop (1-2 cans), coffee in the morning    Current exercise:  walking twice a week for 15 minutes midday    The patient does have a known family history of diabetes.    Patient is using: continuous glucose monitor  The patient is currently checking the blood glucose continuously.    Hypoglycemia frequency: rare  Hypoglycemia awareness: Yes     CGM Data 3/12/2025  7 days average: 125 mg/dL  14 day average: 133 mg/dL  30 day average: 135 mg/dL    CGM Data 2/12/2025  Time in ranges  7 day averages   7 day  Above: 60%  In range: 40%  Low: 0%   14 day  Above: 49%  In range: 51%  Low: 0% 30 day  Above: 49%  In range: 51%  Low: 0% 12-6 am 117  6-12 am 171  12-6 pm 165  6-12 pm 162   Average glucose Time CGM Active   7 days average: 153 mg/dL  14 day average: 146 mg/dL  30 day average: 148 mg/dL 14 day  Scans per day: 4  Sensor data captured: 82%      CGM Data 1/15/2025  Time in ranges     7 day  Above: 47%  In range: 53%  Low: 0%   14 day  Above: 45%  In range: 55%  Low: 0% 30 day  Above: 46%  In range: 54%  Low: 0%    Average glucose Time CGM Active   7 days average: 151 mg/dL  14 day average: 149 mg/dL  30 day average: 149 mg/dL 14 day  Scans per day: 3  Sensor data captured: 72%      CGM Data 12/18/24  Time in ranges      7 day  Above: 43%  In range: 53%  Low: 4%   14 day  Above: 44%  In range: 54%  Low: 2% 30 day  Above: 50%  In range: 49%  Low: 1%    Average glucose Time CGM Active   7 days average: 140 mg/dL  14 day average: 143 mg/dL  30 day average: 152 mg/dL 14 day  Scans per day: 4  Sensor data captured: 84%      CGM Data 11/20/24  Time in ranges     7 day  Above: 57%  In range: 43%  Low: 0%   14 day  Above: 56%  In range: 41%  Low: 3%     Average glucose Sensor usage   7 days average: 162 mg/dL  14 day average: 159 mg/dL 14 day  Scans per day: 2  Sensor data captured: 53%     Adverse Effects:   denies     Objective     Last Recorded Vitals:  BP Readings from Last 6 Encounters:   02/24/25 104/54   11/25/24 113/65   08/12/24 100/52   05/15/24 112/52   03/18/24 109/70   12/12/23 96/52        Wt Readings from Last 6 Encounters:   03/07/25 73.9 kg (163 lb)   02/24/25 73.9 kg (163 lb)   11/25/24 76 kg (167 lb 9.6 oz)   08/12/24 77.1 kg (170 lb)   05/15/24 80.7 kg (178 lb)   03/18/24 78 kg (172 lb)       Diabetes Pharmacotherapy:  Mounjaro 12.5 mg once weekly  Farxiga 10 mg daily  Lantus 26 units in the morning    Previous DM therapy:  Metformin - diarrhea  Trulicity - alternative therapy with Mounjaro    Primary/Secondary Prevention   - Statin? Yes  - ACE-I/ARB? Yes  - Aspirin? No    Pertinent PMH Review:  - PMH of Pancreatitis: No  - PMH of Retinopathy: No  - PMH of Urinary Tract Infections: Yes, 3-4 years ago, does not have them often  - PMH of MTC: No    Lab Review  Lab Results   Component Value Date    BILITOT 0.6 02/13/2025    CALCIUM 10.1 02/13/2025    CO2 27 02/13/2025    CL 99 02/13/2025    CREATININE 0.84 02/13/2025    GLUCOSE 141 (H) 02/13/2025    ALKPHOS 61 02/13/2025    K 4.3 02/13/2025    PROT 7.1 02/13/2025     02/13/2025    AST 17 02/13/2025    ALT 22 02/13/2025    BUN 22 02/13/2025    ANIONGAP 11 02/13/2025    MG 2.28 09/05/2023    ALBUMIN 4.6 02/13/2025    GFRF 82 09/05/2023     Lab Results   Component  "Value Date    TRIG 160 (H) 02/13/2025    CHOL 145 02/13/2025    LDLCALC 81 02/13/2025    HDL 38 (L) 02/13/2025     Lab Results   Component Value Date    HGBA1C 6.9 (A) 02/24/2025    HGBA1C 8.5 (A) 11/25/2024    HGBA1C 8.1 (A) 08/12/2024     No components found for: \"UACR\"  The 10-year ASCVD risk score (Elizabeth CAMPA, et al., 2019) is: 15.4%    Values used to calculate the score:      Age: 69 years      Sex: Female      Is Non- : No      Diabetic: Yes      Tobacco smoker: No      Systolic Blood Pressure: 104 mmHg      Is BP treated: Yes      HDL Cholesterol: 38 mg/dL      Total Cholesterol: 145 mg/dL    Health Maintenance:   Foot Exam: PCP checks at yearly physical, patient denies cuts or wounds on feet  Eye Exam: 9/2023  Urine Albumin: not available   Influenza Immunization: 11/21/2024  Pneumonia Immunization: up to date    Drug Interactions:  No significant drug interactions identified at this time    Assessment/Plan   Problem List Items Addressed This Visit    None  Visit Diagnoses       Type 2 diabetes mellitus with hyperglycemia, with long-term current use of insulin        Relevant Orders    Referral to Clinical Pharmacy        Patients diabetes borderline controlled with most recent A1c of 6.9% (Goal < 7%). As A1c is now controlled and patient is having mild side effects, will continue current therapy. Consider maximizing GLP-1 at follow-up to reduce daily insulin dose.   Continue  Mounjaro 12.5 mg once weekly   Lantus 26 units at morning  Farxiga 10 mg daily  Scan CGM 4 times daily (morning, twice during the day, and at bedtime)  Follow-up: 4/9/2025 at 9:20 am via phone  PCP Follow-Up: 5/21/2025    Continue all meds under the continuation of care with the referring provider and clinical pharmacy team.  "

## 2025-03-14 ENCOUNTER — PHARMACY VISIT (OUTPATIENT)
Dept: PHARMACY | Facility: CLINIC | Age: 70
End: 2025-03-14
Payer: MEDICARE

## 2025-03-17 ENCOUNTER — APPOINTMENT (OUTPATIENT)
Dept: VASCULAR MEDICINE | Facility: CLINIC | Age: 70
End: 2025-03-17
Payer: MEDICARE

## 2025-03-17 ENCOUNTER — APPOINTMENT (OUTPATIENT)
Dept: BEHAVIORAL HEALTH | Facility: CLINIC | Age: 70
End: 2025-03-17
Payer: MEDICARE

## 2025-03-17 ENCOUNTER — HOSPITAL ENCOUNTER (OUTPATIENT)
Dept: VASCULAR MEDICINE | Facility: CLINIC | Age: 70
Discharge: HOME | End: 2025-03-17
Payer: MEDICARE

## 2025-03-17 DIAGNOSIS — R09.89 DECREASED PULSES IN FEET: ICD-10-CM

## 2025-03-17 DIAGNOSIS — F32.A ANXIETY AND DEPRESSION: ICD-10-CM

## 2025-03-17 DIAGNOSIS — F41.9 ANXIETY AND DEPRESSION: ICD-10-CM

## 2025-03-17 PROCEDURE — 93924 LWR XTR VASC STDY BILAT: CPT

## 2025-03-17 PROCEDURE — 1157F ADVNC CARE PLAN IN RCRD: CPT | Performed by: PSYCHIATRY & NEUROLOGY

## 2025-03-17 PROCEDURE — 1160F RVW MEDS BY RX/DR IN RCRD: CPT | Performed by: PSYCHIATRY & NEUROLOGY

## 2025-03-17 PROCEDURE — 93924 LWR XTR VASC STDY BILAT: CPT | Performed by: SURGERY

## 2025-03-17 PROCEDURE — 1159F MED LIST DOCD IN RCRD: CPT | Performed by: PSYCHIATRY & NEUROLOGY

## 2025-03-17 PROCEDURE — 4010F ACE/ARB THERAPY RXD/TAKEN: CPT | Performed by: PSYCHIATRY & NEUROLOGY

## 2025-03-17 PROCEDURE — 99214 OFFICE O/P EST MOD 30 MIN: CPT | Performed by: PSYCHIATRY & NEUROLOGY

## 2025-03-17 RX ORDER — DULOXETIN HYDROCHLORIDE 60 MG/1
60 CAPSULE, DELAYED RELEASE ORAL 2 TIMES DAILY
Qty: 180 CAPSULE | Refills: 1 | Status: SHIPPED | OUTPATIENT
Start: 2025-03-17 | End: 2025-09-13

## 2025-03-17 RX ORDER — MIRTAZAPINE 30 MG/1
30 TABLET, FILM COATED ORAL NIGHTLY
Qty: 90 TABLET | Refills: 1 | Status: SHIPPED | OUTPATIENT
Start: 2025-03-17 | End: 2025-09-13

## 2025-03-17 NOTE — PROGRESS NOTES
"Subjective   Patient ID: Alexia Cho \"Corina" is a 69 y.o. female who presents for No chief complaint on file.. I am doing well.     Virtual Consent: The patient engaged in a telehealth session via Epic audio visual or phone with this provider practicing within the [facility]. The identity of the patient was verified by their date of birth and last four digits of their social security number. The provider demonstrated that confidentially was preserved at their location. The patient was informed that they were responsible for ensuring confidentially was secured at their location. The patient's location was documented for emergency purposes. The patient was informed of the necessary steps that would occur if an emergency was to occur or technology failed during session.   An interactive audio and video telecommunication system which permits real time communications between the patient (at the patient's home) and provider (at the home office) was utilized to provide this telehealth service.   Verbal consent was requested and obtained from Alexia Cho on this date, 03/17/25 for a telehealth visit and the patient's location was confirmed at the time of the visit.    HPI: I am doing much better. The patient reports that her family has been doing better. She reports her grandson is doing well and the family is having a reunion in July. All is going well by her report.    Past Medical History:  08/10/2015: Abnormal finding on breast imaging  03/11/2013: Abnormal levels of other serum enzymes      Comment:  Abnormal liver enzymes  09/23/2019: Abnormal weight gain      Comment:  Abnormal weight gain  02/24/2025: Abnormal weight loss  02/01/2017: Acute candidiasis of vulva and vagina      Comment:  Yeast vaginitis  03/17/2016: Acute pain of right knee  05/01/2019: Acute stress reaction      Comment:  Acute reaction to stress  12/07/2017: Acute upper respiratory infection, unspecified      Comment:  Upper " respiratory infection with cough and congestion  12/08/2019: Adverse effect of insulin and oral hypoglycemic   (antidiabetic) drugs, initial encounter      Comment:  Metformin adverse reaction  02/14/2019: Allergy status to unspecified drugs, medicaments and   biological substances      Comment:  History of adverse drug reaction  No date: Anxiety  No date: Arthritis  05/09/2023: Arthritis of knee, right  No date: Benign and innocent cardiac murmurs      Comment:  Functional murmur  04/27/2016: Bilateral primary osteoarthritis of knee      Comment:  Primary osteoarthritis of both knees  02/03/2020: Body mass index (BMI) 29.0-29.9, adult      Comment:  BMI 29.0-29.9,adult  03/18/2020: Body mass index (BMI) 29.0-29.9, adult      Comment:  BMI 29.0-29.9,adult  03/17/2016: Decreased range of motion (ROM) of knee  06/13/2017: Dependent relative needing care at home      Comment:  Caregiver stress  10/21/2015: Diaphragmatic hernia without obstruction or gangrene      Comment:  Hiatal hernia  06/19/2014: Effusion, unspecified knee      Comment:  Effusion of knee  02/03/2020: Encounter for general adult medical examination without   abnormal findings      Comment:  Welcome to Medicare preventive visit  10/20/2016: Encounter for immunization      Comment:  Need for prophylactic vaccination and inoculation                against influenza  No date: Encounter for immunization      Comment:  Need for tuberculosis vaccination  01/31/2016: Encounter for other preprocedural examination      Comment:  Pre-op evaluation  05/09/2023: Eyelid retraction right lower eyelid  05/09/2023: Hematuria  No date: Hypertension  06/24/2019: Impacted cerumen, left ear      Comment:  Impacted cerumen of left ear  05/09/2023: Laxity of eyelid  04/21/2015: Long term (current) use of antibiotics      Comment:  Need for prophylactic antibiotic  07/11/2021: Long term (current) use of insulin (Multi)      Comment:  Insulin dose changed  04/11/2021: Major  depressive disorder, 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   psychotic features (Multi)      Comment:  Moderately severe recurrent major depression  07/14/2020: Major depressive disorder, 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   psychotic features (Multi)      Comment:  Moderately severe recurrent major depression  02/03/2020: Major depressive disorder, recurrent, moderate      Comment:  Moderate episode of recurrent major depressive disorder  05/09/2023: Metformin adverse reaction  07/06/2021: Obstructive sleep apnea (adult) (pediatric)      Comment:  Obstructive sleep apnea  01/23/2015: Osteoarthritis of right knee  09/18/2017: Other abnormal and inconclusive findings on diagnostic   imaging of breast      Comment:  Mammogram abnormal  06/13/2017: Other conditions influencing health status      Comment:  DM (diabetes mellitus), secondary uncontrolled  01/02/2022: Other long term (current) drug therapy      Comment:  New medication added  03/07/2022: Other nonrheumatic aortic valve disorders      Comment:  Aortic valve sclerosis  03/11/2013: Other specified abnormal immunological findings in serum      Comment:  KARI positive  07/22/2020: Other specified counseling      Comment:  Encounter for diabetes education  06/25/2020: Other specified counseling      Comment:  Glucometer instruction, encounter for  01/30/2015: Other specified disorders of nose and nasal sinuses      Comment:  Staphylococcus infection of nose  07/21/2016: Other specified symptoms and signs involving the   circulatory and respiratory systems      Comment:  Decreased pedal pulses  No date: Overweight      Comment:  Overweight  No date: Pain in unspecified knee      Comment:  Joint pain, knee  02/21/2014: Pain in unspecified knee      Comment:  Joint pain, knee  No date: Papillomavirus as the cause of diseases classified elsewhere      Comment:  Human papilloma virus infection  No date: Personal history of diseases of the blood and blood-forming   organs and certain  disorders involving the immune mechanism      Comment:  History of anemia  No date: Personal history of diseases of the skin and subcutaneous   tissue      Comment:  History of urticaria  No date: Personal history of other diseases of the circulatory system      Comment:  History of hypertension  03/15/2018: Personal history of other diseases of the respiratory   system      Comment:  History of acute sinusitis  No date: Personal history of other drug therapy      Comment:  History of influenza vaccination  09/18/2017: Personal history of other drug therapy      Comment:  History of influenza vaccination  10/02/2018: Personal history of other endocrine, nutritional and   metabolic disease      Comment:  History of hypokalemia  04/10/2019: Personal history of other mental and behavioral disorders      Comment:  History of depression  01/19/2018: Personal history of other specified conditions      Comment:  History of nausea  09/23/2019: Personal history of other specified conditions      Comment:  History of abnormal weight loss  07/22/2015: Personal history of other specified conditions      Comment:  History of diarrhea  03/17/2017: Personal history of other specified conditions      Comment:  History of insomnia  No date: Personal history of pneumonia (recurrent)      Comment:  History of pneumonia  No date: Personal history of urinary (tract) infections      Comment:  History of bladder infections  No date: Personal history of urinary (tract) infections      Comment:  History of urinary tract infection  No date: Postpartum depression      Comment:  Post partum depression  06/05/2019: Problem related to primary support group, unspecified      Comment:  Relationship problem between caregiver and child  05/09/2023: Pulmonary hypertension (Multi)  06/24/2013: Rash and other nonspecific skin eruption      Comment:  Rash  03/17/2016: Right leg weakness  12/21/2020: Type 2 diabetes mellitus with hyperglycemia (Multi)       Comment:  Type 2 diabetes mellitus with hyperglycemia, without                long-term current use of insulin  01/23/2015: Type 2 diabetes mellitus with hyperglycemia, with long-  term current use of insulin  01/27/2016: Unilateral primary osteoarthritis, right knee      Comment:  Arthritis of knee, right  12/08/2019: Unspecified exophthalmos      Comment:  Ocular proptosis  05/09/2023: Urethral caruncle  No date: Urinary tract infection  07/11/2021: Urinary tract infection, site not specified      Comment:  Acute lower urinary tract infection  No date: Varicella  10/05/2022: Vitamin D deficiency, unspecified      Comment:  Vitamin D deficiency    Review of patient's family history indicates:  Problem: Hypertension      Relation: Mother          Name: Lindsay              Age of Onset: (Not Specified)  Problem: Transient ischemic attack      Relation: Mother          Name: Lindsay              Age of Onset: (Not Specified)  Problem: Diverticulitis      Relation: Mother          Name: Lindsay              Age of Onset: (Not Specified)  Problem: Diverticulosis      Relation: Mother          Name: Lindsay              Age of Onset: (Not Specified)  Problem: Other (bladder cancer)      Relation: Mother          Name: Lindsay              Age of Onset: (Not Specified)  Problem: Hearing loss      Relation: Mother          Name: Lindsay              Age of Onset: (Not Specified)  Problem: Hypertension      Relation: Father          Name: Fariha              Age of Onset: (Not Specified)  Problem: Diabetes      Relation: Father          Name: Fariha              Age of Onset: (Not Specified)  Problem: Diabetes      Relation: Son          Name: Kurtis              Age of Onset: (Not Specified)  Problem: Hearing loss      Relation: Son          Name: Kurtis              Age of Onset: (Not Specified)    MSE: The patient is alert, fully oriented, language is intact, and recent and remote memory intact. The patient denies  any suicidal or homicidal ideation or plans. The patient presents with no depressive, manic or psychotic symptoms. Thought is logical and clear. No disturbances of judgment or insight are exhibited. No behavioral disturbances are present on examination.    Mental Status Exam     General: In no acute distress.   Appearance: Calm  Attitude: Cooperative.   Behavior: Anxious and labile features in remission.   Motor Activity: No agitation or retardation. No EPS/TD. Normal gait and station.   Speech: Regular rate, rhythm, volume and tone, spontaneous, fluent.   Mood: Euthymic   Affect: Euthymic.   Thought Process: Rational.  Thought Content: Appropriate  Thought Perception: Does not endorse auditory or visual hallucinations, does not appear to be responding to hallucinatory stimuli.   Cognition: Alert, oriented x3. No deficits noted. Adequate fund of knowledge. No deficit in recent and remote memory. No deficits in attention, concentration or language.    Insight: Insight is limited.   Judgment: Judgment is limited.       Appearance: well-groomed.   Build: Average.   Demeanor: Average.  Eye Contact: Average..   Motor Activity: Average.   Speech: Clear.   Language: Neurologic language is intact.   Fund of Knowledge: aware of current events,~fair fund of knowledge.   Delusions: None Reported.   Self Harm: None Reported.   Aggressive: None Reported.   Mood: Euthymic  Affect: Full.   Orientation: Alert.   Manner: Cooperative.   Thought process: Goal-directed.   Thought association: displays rational thought process.   Content of thought: No suicidal or homicidal ideation or plans are evidenced.   Abstract/ Rational Thought: intact   Memory: Grossly intact.   Behavior: Calm.   Attention/Concentration: normal.   Cognition: Intact.   Intelligence Estimate: Average.   Executive Function: Intact.   Insight: Intact.   Judgement: Intact.             Review of Systems   Neurological:         MSE: The patient is alert, fully  oriented, language is intact, and recent and remote memory intact. The patient denies any suicidal or homicidal ideation or plans. The patient presents with no depressive, manic or psychotic symptoms. Thought is logical and clear. No disturbances of judgment or insight are exhibited. No behavioral disturbances are present on examination.    Mental Status Exam     General: In no acute distress.   Appearance: Calm  Attitude: Cooperative.   Behavior: Anxious and labile features in remission.   Motor Activity: No agitation or retardation. No EPS/TD. Normal gait and station.   Speech: Regular rate, rhythm, volume and tone, spontaneous, fluent.   Mood: Euthymic   Affect: Euthymic.   Thought Process: Rational.  Thought Content: Appropriate  Thought Perception: Does not endorse auditory or visual hallucinations, does not appear to be responding to hallucinatory stimuli.   Cognition: Alert, oriented x3. No deficits noted. Adequate fund of knowledge. No deficit in recent and remote memory. No deficits in attention, concentration or language.    Insight: Insight is limited.   Judgment: Judgment is limited.       Appearance: well-groomed.   Build: Average.   Demeanor: Average.  Eye Contact: Average..   Motor Activity: Average.   Speech: Clear.   Language: Neurologic language is intact.   Fund of Knowledge: aware of current events,~fair fund of knowledge.   Delusions: None Reported.   Self Harm: None Reported.   Aggressive: None Reported.   Mood: Euthymic  Affect: Full.   Orientation: Alert.   Manner: Cooperative.   Thought process: Goal-directed.   Thought association: displays rational thought process.   Content of thought: No suicidal or homicidal ideation or plans are evidenced.   Abstract/ Rational Thought: intact   Memory: Grossly intact.   Behavior: Calm.   Attention/Concentration: normal.   Cognition: Intact.   Intelligence Estimate: Average.   Executive Function: Intact.   Insight: Intact.   Judgement: Intact.       Psychiatric/Behavioral:          MSE: The patient is alert, fully oriented, language is intact, and recent and remote memory intact. The patient denies any suicidal or homicidal ideation or plans. The patient presents with no depressive, manic or psychotic symptoms. Thought is logical and clear. No disturbances of judgment or insight are exhibited. No behavioral disturbances are present on examination.    Mental Status Exam     General: In no acute distress.   Appearance: Calm  Attitude: Cooperative.   Behavior: Anxious and labile features in remission.   Motor Activity: No agitation or retardation. No EPS/TD. Normal gait and station.   Speech: Regular rate, rhythm, volume and tone, spontaneous, fluent.   Mood: Euthymic   Affect: Euthymic.   Thought Process: Rational.  Thought Content: Appropriate  Thought Perception: Does not endorse auditory or visual hallucinations, does not appear to be responding to hallucinatory stimuli.   Cognition: Alert, oriented x3. No deficits noted. Adequate fund of knowledge. No deficit in recent and remote memory. No deficits in attention, concentration or language.    Insight: Insight is limited.   Judgment: Judgment is limited.       Appearance: well-groomed.   Build: Average.   Demeanor: Average.  Eye Contact: Average..   Motor Activity: Average.   Speech: Clear.   Language: Neurologic language is intact.   Fund of Knowledge: aware of current events,~fair fund of knowledge.   Delusions: None Reported.   Self Harm: None Reported.   Aggressive: None Reported.   Mood: Euthymic  Affect: Full.   Orientation: Alert.   Manner: Cooperative.   Thought process: Goal-directed.   Thought association: displays rational thought process.   Content of thought: No suicidal or homicidal ideation or plans are evidenced.   Abstract/ Rational Thought: intact   Memory: Grossly intact.   Behavior: Calm.   Attention/Concentration: normal.   Cognition: Intact.   Intelligence Estimate: Average.   Executive  Function: Intact.   Insight: Intact.   Judgement: Intact.      All other systems reviewed and are negative.    Psych Review of Symptoms:    ADHD: Patient denied any symptoms.         Anxiety: Patient denied any symptoms.         Developmental and Sensory Concerns: Patient denied any symptoms.         Depressive Symptoms: Patient denied any symptoms.         Disruptive and Conduct Symptoms: Patient denied any symptoms.         Eating / Feeding Concerns: Patient denied any symptoms.         Elimination Symptoms: Patient denied any symptoms.         Manic Symptoms: Patient denied any symptoms.         Obsessive-Compulsive Symptoms: Patient denied any symptoms.         Psychotic Symptoms: Patient denied any symptoms.           Trauma Related Symptoms: Patient denied any symptoms.           Sleep Concerns: Patient denied any symptoms.             Objective   Physical Exam  Neurological:      Mental Status: She is alert and oriented to person, place, and time.      Comments: MSE: The patient is alert, fully oriented, language is intact, and recent and remote memory intact. The patient denies any suicidal or homicidal ideation or plans. The patient presents with no depressive, manic or psychotic symptoms. Thought is logical and clear. No disturbances of judgment or insight are exhibited. No behavioral disturbances are present on examination.    Mental Status Exam     General: In no acute distress.   Appearance: Calm  Attitude: Cooperative.   Behavior: Anxious and labile features in remission.   Motor Activity: No agitation or retardation. No EPS/TD. Normal gait and station.   Speech: Regular rate, rhythm, volume and tone, spontaneous, fluent.   Mood: Euthymic   Affect: Euthymic.   Thought Process: Rational.  Thought Content: Appropriate  Thought Perception: Does not endorse auditory or visual hallucinations, does not appear to be responding to hallucinatory stimuli.   Cognition: Alert, oriented x3. No deficits noted.  Adequate fund of knowledge. No deficit in recent and remote memory. No deficits in attention, concentration or language.    Insight: Insight is limited.   Judgment: Judgment is limited.       Appearance: well-groomed.   Build: Average.   Demeanor: Average.  Eye Contact: Average..   Motor Activity: Average.   Speech: Clear.   Language: Neurologic language is intact.   Fund of Knowledge: aware of current events,~fair fund of knowledge.   Delusions: None Reported.   Self Harm: None Reported.   Aggressive: None Reported.   Mood: Euthymic  Affect: Full.   Orientation: Alert.   Manner: Cooperative.   Thought process: Goal-directed.   Thought association: displays rational thought process.   Content of thought: No suicidal or homicidal ideation or plans are evidenced.   Abstract/ Rational Thought: intact   Memory: Grossly intact.   Behavior: Calm.   Attention/Concentration: normal.   Cognition: Intact.   Intelligence Estimate: Average.   Executive Function: Intact.   Insight: Intact.   Judgement: Intact.      Psychiatric:         Mood and Affect: Mood normal.         Behavior: Behavior normal.         Thought Content: Thought content normal.         Judgment: Judgment normal.      Comments: MSE: The patient is alert, fully oriented, language is intact, and recent and remote memory intact. The patient denies any suicidal or homicidal ideation or plans. The patient presents with no depressive, manic or psychotic symptoms. Thought is logical and clear. No disturbances of judgment or insight are exhibited. No behavioral disturbances are present on examination.    Mental Status Exam     General: In no acute distress.   Appearance: Calm  Attitude: Cooperative.   Behavior: Anxious and labile features in remission.   Motor Activity: No agitation or retardation. No EPS/TD. Normal gait and station.   Speech: Regular rate, rhythm, volume and tone, spontaneous, fluent.   Mood: Euthymic   Affect: Euthymic.   Thought Process:  Rational.  Thought Content: Appropriate  Thought Perception: Does not endorse auditory or visual hallucinations, does not appear to be responding to hallucinatory stimuli.   Cognition: Alert, oriented x3. No deficits noted. Adequate fund of knowledge. No deficit in recent and remote memory. No deficits in attention, concentration or language.    Insight: Insight is limited.   Judgment: Judgment is limited.       Appearance: well-groomed.   Build: Average.   Demeanor: Average.  Eye Contact: Average..   Motor Activity: Average.   Speech: Clear.   Language: Neurologic language is intact.   Fund of Knowledge: aware of current events,~fair fund of knowledge.   Delusions: None Reported.   Self Harm: None Reported.   Aggressive: None Reported.   Mood: Euthymic  Affect: Full.   Orientation: Alert.   Manner: Cooperative.   Thought process: Goal-directed.   Thought association: displays rational thought process.   Content of thought: No suicidal or homicidal ideation or plans are evidenced.   Abstract/ Rational Thought: intact   Memory: Grossly intact.   Behavior: Calm.   Attention/Concentration: normal.   Cognition: Intact.   Intelligence Estimate: Average.   Executive Function: Intact.   Insight: Intact.   Judgement: Intact.            Lab Review:   Office Visit on 02/24/2025   Component Date Value    POC HEMOGLOBIN A1c 02/24/2025 6.9 (A)    Orders Only on 02/13/2025   Component Date Value    CHOLESTEROL, TOTAL 02/13/2025 145     HDL CHOLESTEROL 02/13/2025 38 (L)     TRIGLYCERIDES 02/13/2025 160 (H)     LDL-CHOLESTEROL 02/13/2025 81     CHOL/HDLC RATIO 02/13/2025 3.8     NON HDL CHOLESTEROL 02/13/2025 107     DIRECT LDL 02/13/2025 93     GLUCOSE 02/13/2025 141 (H)     UREA NITROGEN (BUN) 02/13/2025 22     CREATININE 02/13/2025 0.84     EGFR 02/13/2025 75     SODIUM 02/13/2025 137     POTASSIUM 02/13/2025 4.3     CHLORIDE 02/13/2025 99     CARBON DIOXIDE 02/13/2025 27     ELECTROLYTE BALANCE 02/13/2025 11     CALCIUM  02/13/2025 10.1     PROTEIN, TOTAL 02/13/2025 7.1     ALBUMIN 02/13/2025 4.6     BILIRUBIN, TOTAL 02/13/2025 0.6     ALKALINE PHOSPHATASE 02/13/2025 61     AST 02/13/2025 17     ALT 02/13/2025 22     WHITE BLOOD CELL COUNT 02/13/2025 7.8     RED BLOOD CELL COUNT 02/13/2025 5.16 (H)     HEMOGLOBIN 02/13/2025 16.1 (H)     HEMATOCRIT 02/13/2025 48.2 (H)     MCV 02/13/2025 93.4     MCH 02/13/2025 31.2     MCHC 02/13/2025 33.4     RDW 02/13/2025 12.5     PLATELET COUNT 02/13/2025 292     MPV 02/13/2025 11.8     ABSOLUTE NEUTROPHILS 02/13/2025 4,875     ABSOLUTE LYMPHOCYTES 02/13/2025 1,927     ABSOLUTE MONOCYTES 02/13/2025 757     ABSOLUTE EOSINOPHILS 02/13/2025 203     ABSOLUTE BASOPHILS 02/13/2025 39     NEUTROPHILS 02/13/2025 62.5     LYMPHOCYTES 02/13/2025 24.7     MONOCYTES 02/13/2025 9.7     EOSINOPHILS 02/13/2025 2.6     BASOPHILS 02/13/2025 0.5     VITAMIN B12 02/13/2025 990     TSH W/REFLEX TO FT4 02/13/2025 1.40     VITAMIN D,25-OH,TOTAL,IA 02/13/2025 51     CREATININE, RANDOM URINE 02/13/2025 79     ALBUMIN, URINE 02/13/2025 0.2     ALBUMIN/CREATININE RATIO* 02/13/2025 3    Office Visit on 11/25/2024   Component Date Value    POC HEMOGLOBIN A1c 11/25/2024 8.5 (A)     [facility] NOW Covid Serial N* 11/25/2024 [id number]     [facility] NOW Covid Expirati* 11/25/2024 02/16/2026     POC ROBIN-COV-2 AG 11/25/2024 Presumptive negative test for SARS-CoV-2 (no antigen detected)     POC Rapid Influenza A 11/25/2024 Negative     POC Rapid Influenza B 11/25/2024 Negative        Assessment/Plan   Psychiatric Risk Assessment  Violence Risk Assessment: none  Acute Risk of Harm to Others is Considered: low   Suicide Risk Assessment: age > 65 yrs old and   Protective Factors against Suicide: adherence to  treatment, fear of suicide, moral objections to suicide, positive family relationships, and sense of responsibility toward family  Acute Risk of Harm to Self is Considered: low    Imminent Risk of Suicide or Serious Self-Injury:  Low   Chronic Risk of Suicide of Serious Self-Injury: Low  Risk factors: Age, depression history and   Protective factors: Denies current suicidal ideation, denies history of suicide attempts , willingness to seek help and support , gender, access to a variety of clinical interventions , and receiving and engaged in care for mental, physical, and substance use disorders      Imminent Risk of Violence or Homicide: Low   Risk Factors: No significant risk factors identified on screening  Protective Factors: Lack of known history of harm to others , Lack of known history of violent ideation , and lack of known access to firearms.     Assessment & Plan  Anxiety and depression  Diagnosis: anxiety and depression, obsessive compulsive features and major depression all in substantial remission.      Treatment plan:   The FDA risks, benefits & alternatives to the medications prescribed were explained to you today. You were able to understand & repeat these risks, benefits & alternatives to these prescribed medications. You have agreed to proceed with treatment with the medications discussed based on the conclusion that the benefit outweighs the risks of this treatment regimen: continue duloxetine 60 mg twice daily and mirtazapine 30 mg nightly.      Follow up in October of 2025 or sooner if needed.   Orders:    mirtazapine (Remeron) 30 mg tablet; Take 1 tablet (30 mg) by mouth once daily at bedtime.    DULoxetine (Cymbalta) 60 mg DR capsule; Take 1 capsule (60 mg) by mouth 2 times a day.    Time:   Prep time on date of the patient encounter: 5 minutes.   Time spent directly with patient/family/caregiver: 18 minutes.   Additional time spent on patient care activities:  minutes.   Documentation time: 7 minutes.   Total time on date of patient encounter: 30 minutes.

## 2025-03-27 ENCOUNTER — APPOINTMENT (OUTPATIENT)
Dept: RADIOLOGY | Facility: CLINIC | Age: 70
End: 2025-03-27
Payer: MEDICARE

## 2025-03-27 ENCOUNTER — TELEPHONE (OUTPATIENT)
Dept: PRIMARY CARE | Facility: CLINIC | Age: 70
End: 2025-03-27
Payer: MEDICARE

## 2025-03-27 NOTE — TELEPHONE ENCOUNTER
Mary stated:    She had nausea and diarrhea last night  12:30 pm today she had a little bit of diarrhea agin and when she wiped she saw bright red blood on the toilet paper about 4 quarters size  Second time it she had a little diarrhea today she didn't see as much on the toilet paper but did notice blood around her BM in the toilet, still bright red  She has tenderness to left side but not painful, left side between ribs and hip areas  No fever, not nauseous today  Does have diverticulosis and hemorrhoids per her GI Doctor said from previous colonoscopy 5 years ago    Please advise

## 2025-03-27 NOTE — TELEPHONE ENCOUNTER
Patient LMOM asking if Dr Wasserman has an opening this afternoon    She is experiencing diarrhea with some blood    Mary

## 2025-03-28 NOTE — TELEPHONE ENCOUNTER
I called to speak to pt about this and urge her to go to ER. I called and spoke to her. They gave her 2 bags of antibiotics and told her she had colitis and then sent her home with Flagyl and Omnicef. They also gave her Percocet and Zofran for nausea PRN. Told her to FU with Dr Caruso within a week. Do you still want to see her Monday??

## 2025-03-28 NOTE — TELEPHONE ENCOUNTER
She is making apt with Dr Caruso and thinks that she would like to see him, but thanks us very much for our calling her.

## 2025-04-07 ENCOUNTER — OFFICE (OUTPATIENT)
Dept: URBAN - METROPOLITAN AREA CLINIC 26 | Facility: CLINIC | Age: 70
End: 2025-04-07
Payer: MEDICARE

## 2025-04-07 VITALS
SYSTOLIC BLOOD PRESSURE: 121 MMHG | DIASTOLIC BLOOD PRESSURE: 73 MMHG | TEMPERATURE: 98.2 F | WEIGHT: 161 LBS | HEART RATE: 89 BPM | HEIGHT: 66 IN

## 2025-04-07 DIAGNOSIS — R19.7 DIARRHEA, UNSPECIFIED: ICD-10-CM

## 2025-04-07 DIAGNOSIS — K57.90 DIVERTICULOSIS OF INTESTINE, PART UNSPECIFIED, WITHOUT PERFO: ICD-10-CM

## 2025-04-07 DIAGNOSIS — R93.3 ABNORMAL FINDINGS ON DIAGNOSTIC IMAGING OF OTHER PARTS OF DI: ICD-10-CM

## 2025-04-07 DIAGNOSIS — Z86.0100 PERSONAL HISTORY OF COLON POLYPS, UNSPECIFIED: ICD-10-CM

## 2025-04-07 PROCEDURE — 99204 OFFICE O/P NEW MOD 45 MIN: CPT | Performed by: INTERNAL MEDICINE

## 2025-04-09 ENCOUNTER — APPOINTMENT (OUTPATIENT)
Dept: PHARMACY | Facility: HOSPITAL | Age: 70
End: 2025-04-09
Payer: MEDICARE

## 2025-04-09 DIAGNOSIS — E11.65 TYPE 2 DIABETES MELLITUS WITH HYPERGLYCEMIA, WITH LONG-TERM CURRENT USE OF INSULIN: ICD-10-CM

## 2025-04-09 DIAGNOSIS — Z79.4 TYPE 2 DIABETES MELLITUS WITH HYPERGLYCEMIA, WITH LONG-TERM CURRENT USE OF INSULIN: ICD-10-CM

## 2025-04-09 NOTE — PROGRESS NOTES
Patient is sent at the request of Nat Wasserman MD for my opinion regarding Type 2 diabetes.  My final recommendations will be communicated back to the requesting provider by way of shared medical record.    Subjective     Diabetes  She has type 2 diabetes mellitus. There are no hypoglycemic complications. Risk factors for coronary artery disease include diabetes mellitus, dyslipidemia and hypertension. An ACE inhibitor/angiotensin II receptor blocker is being taken.     Patient reports hematochezia. Having colonoscopy on Friday. Will skip dose of Mounjaro this week. Plans to restart after colonoscopy. Also taking half dose of Lantus on Thursday as she prepares for colonoscopy. Went on clear diet for 2 days after ER visit. BG lowered to 60s mg/dL.     More recently, she has had looser stool from antibiotics.     Past Medical History:  She has a past medical history of Abnormal finding on breast imaging (08/10/2015), Abnormal levels of other serum enzymes (03/11/2013), Abnormal weight gain (09/23/2019), Abnormal weight loss (02/24/2025), Acute candidiasis of vulva and vagina (02/01/2017), Acute pain of right knee (03/17/2016), Acute stress reaction (05/01/2019), Acute upper respiratory infection, unspecified (12/07/2017), Adverse effect of insulin and oral hypoglycemic (antidiabetic) drugs, initial encounter (12/08/2019), Allergy status to unspecified drugs, medicaments and biological substances (02/14/2019), Anxiety, Arthritis, Arthritis of knee, right (05/09/2023), Benign and innocent cardiac murmurs, Bilateral primary osteoarthritis of knee (04/27/2016), Body mass index (BMI) 29.0-29.9, adult (02/03/2020), Body mass index (BMI) 29.0-29.9, adult (03/18/2020), Decreased range of motion (ROM) of knee (03/17/2016), Dependent relative needing care at home (06/13/2017), Diaphragmatic hernia without obstruction or gangrene (10/21/2015), Effusion, unspecified knee (06/19/2014), Encounter for general adult medical  examination without abnormal findings (02/03/2020), Encounter for immunization (10/20/2016), Encounter for immunization, Encounter for other preprocedural examination (01/31/2016), Eyelid retraction right lower eyelid (05/09/2023), Hematuria (05/09/2023), Hypertension, Impacted cerumen, left ear (06/24/2019), Laxity of eyelid (05/09/2023), Long term (current) use of antibiotics (04/21/2015), Long term (current) use of insulin (Multi) (07/11/2021), Major depressive disorder, 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 psychotic features (Multi) (04/11/2021), Major depressive disorder, 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 psychotic features (Multi) (07/14/2020), Major depressive disorder, recurrent, moderate (02/03/2020), Metformin adverse reaction (05/09/2023), Obstructive sleep apnea (adult) (pediatric) (07/06/2021), Osteoarthritis of right knee (01/23/2015), Other abnormal and inconclusive findings on diagnostic imaging of breast (09/18/2017), Other conditions influencing health status (06/13/2017), Other long term (current) drug therapy (01/02/2022), 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), Other specified abnormal immunological findings in serum (03/11/2013), Other specified counseling (07/22/2020), Other specified counseling (06/25/2020), Other specified disorders of nose and nasal sinuses (01/30/2015), Other specified symptoms and signs involving the circulatory and respiratory systems (07/21/2016), Overweight, Pain in unspecified knee, Pain in unspecified knee (02/21/2014), Papillomavirus as the cause of diseases classified elsewhere, Personal history of diseases of the blood and blood-forming organs and certain disorders involving the immune mechanism, Personal history of diseases of the skin and subcutaneous tissue, Personal history of other diseases of the circulatory system, Personal history of other diseases of the respiratory system (03/15/2018), Personal history of other drug therapy, Personal history of other drug  therapy (09/18/2017), Personal history of other endocrine, nutritional and metabolic disease (10/02/2018), Personal history of other mental and behavioral disorders (04/10/2019), Personal history of other specified conditions (01/19/2018), Personal history of other specified conditions (09/23/2019), Personal history of other specified conditions (07/22/2015), Personal history of other specified conditions (03/17/2017), Personal history of pneumonia (recurrent), Personal history of urinary (tract) infections, Personal history of urinary (tract) infections, Postpartum depression, Problem related to primary support group, unspecified (06/05/2019), Pulmonary hypertension (Multi) (05/09/2023), Rash and other nonspecific skin eruption (06/24/2013), Right leg weakness (03/17/2016), Type 2 diabetes mellitus with hyperglycemia (Multi) (12/21/2020), Type 2 diabetes mellitus with hyperglycemia, with long-term current use of insulin (01/23/2015), Unilateral primary osteoarthritis, right knee (01/27/2016), Unspecified exophthalmos (12/08/2019), Urethral caruncle (05/09/2023), Urinary tract infection, Urinary tract infection, site not specified (07/11/2021), Varicella, and Vitamin D deficiency, unspecified (10/05/2022).    Past Surgical History:  She has a past surgical history that includes Colonoscopy (05/29/2012); Esophagogastroduodenoscopy (05/29/2012); Other surgical history (Left, 2016); Total knee arthroplasty (07/22/2015); Other surgical history (02/03/2020); Total knee arthroplasty (04/21/2015); Joint replacement (2014); and [place] tooth extraction.    Social History:  She reports that she has never smoked. She has never been exposed to tobacco smoke. She has never used smokeless tobacco. She reports current alcohol use. She reports that she does not use drugs.    Family History:  Family History   Problem Relation Name Age of Onset    Hypertension Mother Lindsay     Transient ischemic attack Mother Lindsay      Diverticulitis Mother Lindsay     Diverticulosis Mother Lindsay     Other (bladder cancer) Mother Lindsay     Hearing loss Mother Lindsay     Hypertension Father Fariha     Diabetes Father Fariha     Diabetes Son Kurtis     Hearing loss Son Kurtis        Allergies:  Ciprofloxacin, Diclofenac sodium, Erythromycin base, and Naproxen    Current diet:  3 meals per day, 1-2 times per week of fast food (was worse over the summer)  Breakfast: cereal with blueberries  Lunch: turkey sandwich with fruit and yogurt  Dinner: protein, vegetable, and carbohydrate  Snack: 2-3 times per day - was grabbing cookies has now transitioned to wheat thins with cheese or a piece of fruit  Drink: water or diet soda pop (1-2 cans), coffee in the morning    Current exercise:  walking twice a week for 15 minutes midday    The patient does have a known family history of diabetes.    Patient is using: continuous glucose monitor  The patient is currently checking the blood glucose continuously.    Hypoglycemia frequency: rare  Hypoglycemia awareness: Yes     CGM Data  Average glucose   7 days average: 107 mg/dL  14 day average: 110 mg/dL  30 day average: 123 mg/dL      CGM Data 3/12/2025  7 days average: 125 mg/dL  14 day average: 133 mg/dL  30 day average: 135 mg/dL    CGM Data 2/12/2025  Time in ranges  7 day averages   7 day  Above: 60%  In range: 40%  Low: 0%   14 day  Above: 49%  In range: 51%  Low: 0% 30 day  Above: 49%  In range: 51%  Low: 0% 12-6 am 117  6-12 am 171  12-6 pm 165  6-12 pm 162   Average glucose Time CGM Active   7 days average: 153 mg/dL  14 day average: 146 mg/dL  30 day average: 148 mg/dL 14 day  Scans per day: 4  Sensor data captured: 82%      Adverse Effects:   denies     Objective     Last Recorded Vitals:  BP Readings from Last 6 Encounters:   02/24/25 104/54   11/25/24 113/65   08/12/24 100/52   05/15/24 112/52   03/18/24 109/70   12/12/23 96/52        Wt Readings from Last 6 Encounters:   03/07/25 73.9 kg (163 lb)  "  02/24/25 73.9 kg (163 lb)   11/25/24 76 kg (167 lb 9.6 oz)   08/12/24 77.1 kg (170 lb)   05/15/24 80.7 kg (178 lb)   03/18/24 78 kg (172 lb)       Diabetes Pharmacotherapy:  Mounjaro 12.5 mg once weekly  Farxiga 10 mg daily  Lantus 26 units in the morning    Previous DM therapy:  Metformin - diarrhea  Trulicity - alternative therapy with Mounjaro    Primary/Secondary Prevention   - Statin? Yes  - ACE-I/ARB? Yes  - Aspirin? No    Pertinent PMH Review:  - PMH of Pancreatitis: No  - PMH of Retinopathy: No  - PMH of Urinary Tract Infections: Yes, 3-4 years ago, does not have them often  - PMH of MTC: No    Lab Review  Lab Results   Component Value Date    BILITOT 0.6 02/13/2025    CALCIUM 10.1 02/13/2025    CO2 27 02/13/2025    CL 99 02/13/2025    CREATININE 0.84 02/13/2025    GLUCOSE 141 (H) 02/13/2025    ALKPHOS 61 02/13/2025    K 4.3 02/13/2025    PROT 7.1 02/13/2025     02/13/2025    AST 17 02/13/2025    ALT 22 02/13/2025    BUN 22 02/13/2025    ANIONGAP 11 02/13/2025    MG 2.28 09/05/2023    ALBUMIN 4.6 02/13/2025    GFRF 82 09/05/2023     Lab Results   Component Value Date    TRIG 160 (H) 02/13/2025    CHOL 145 02/13/2025    LDLCALC 81 02/13/2025    HDL 38 (L) 02/13/2025     Lab Results   Component Value Date    HGBA1C 6.9 (A) 02/24/2025    HGBA1C 8.5 (A) 11/25/2024    HGBA1C 8.1 (A) 08/12/2024     No components found for: \"UACR\"  The 10-year ASCVD risk score (Elizabeth DK, et al., 2019) is: 15.4%    Values used to calculate the score:      Age: 69 years      Sex: Female      Is Non- : No      Diabetic: Yes      Tobacco smoker: No      Systolic Blood Pressure: 104 mmHg      Is BP treated: Yes      HDL Cholesterol: 38 mg/dL      Total Cholesterol: 145 mg/dL    Health Maintenance:   Foot Exam: PCP checks at yearly physical, patient denies cuts or wounds on feet  Eye Exam: 9/2023  Urine Albumin: not available   Influenza Immunization: 11/21/2024  Pneumonia Immunization: up to date    Drug " Interactions:  No significant drug interactions identified at this time    Assessment/Plan   Problem List Items Addressed This Visit    None  Visit Diagnoses       Type 2 diabetes mellitus with hyperglycemia, with long-term current use of insulin        Relevant Orders    Referral to Clinical Pharmacy        Patients diabetes borderline controlled with most recent A1c of 6.9% (Goal < 7%). Given current concerns with hematochezia and upcoming colonoscopy, will hold off on dose adjustments to GLP-1. Plan to decrease dose of basal insulin with decline to average glucose.   Continue  Mounjaro 12.5 mg once weekly   Farxiga 10 mg daily  Decrease  Lantus 24 units injected once daily in the morning  Scan CGM 4 times daily (morning, twice during the day, and at bedtime)  Follow-up: 5/7/2025 at 9:40 am via phone  PCP Follow-Up: 5/21/2025    Continue all meds under the continuation of care with the referring provider and clinical pharmacy team.

## 2025-04-10 VITALS
OXYGEN SATURATION: 96 % | HEIGHT: 66 IN | OXYGEN SATURATION: 100 % | OXYGEN SATURATION: 92 % | RESPIRATION RATE: 12 BRPM | SYSTOLIC BLOOD PRESSURE: 130 MMHG | HEART RATE: 73 BPM | HEART RATE: 80 BPM | SYSTOLIC BLOOD PRESSURE: 135 MMHG | DIASTOLIC BLOOD PRESSURE: 65 MMHG | DIASTOLIC BLOOD PRESSURE: 71 MMHG | HEART RATE: 76 BPM | DIASTOLIC BLOOD PRESSURE: 78 MMHG | DIASTOLIC BLOOD PRESSURE: 68 MMHG | HEART RATE: 72 BPM | HEART RATE: 74 BPM | DIASTOLIC BLOOD PRESSURE: 61 MMHG | DIASTOLIC BLOOD PRESSURE: 64 MMHG | RESPIRATION RATE: 11 BRPM | DIASTOLIC BLOOD PRESSURE: 78 MMHG | DIASTOLIC BLOOD PRESSURE: 61 MMHG | RESPIRATION RATE: 12 BRPM | DIASTOLIC BLOOD PRESSURE: 77 MMHG | DIASTOLIC BLOOD PRESSURE: 64 MMHG | SYSTOLIC BLOOD PRESSURE: 152 MMHG | DIASTOLIC BLOOD PRESSURE: 65 MMHG | SYSTOLIC BLOOD PRESSURE: 124 MMHG | DIASTOLIC BLOOD PRESSURE: 64 MMHG | DIASTOLIC BLOOD PRESSURE: 72 MMHG | OXYGEN SATURATION: 98 % | OXYGEN SATURATION: 96 % | OXYGEN SATURATION: 100 % | SYSTOLIC BLOOD PRESSURE: 129 MMHG | SYSTOLIC BLOOD PRESSURE: 116 MMHG | TEMPERATURE: 97.6 F | RESPIRATION RATE: 12 BRPM | SYSTOLIC BLOOD PRESSURE: 127 MMHG | DIASTOLIC BLOOD PRESSURE: 71 MMHG | RESPIRATION RATE: 11 BRPM | SYSTOLIC BLOOD PRESSURE: 152 MMHG | OXYGEN SATURATION: 100 % | HEART RATE: 67 BPM | DIASTOLIC BLOOD PRESSURE: 68 MMHG | SYSTOLIC BLOOD PRESSURE: 149 MMHG | SYSTOLIC BLOOD PRESSURE: 138 MMHG | SYSTOLIC BLOOD PRESSURE: 130 MMHG | RESPIRATION RATE: 18 BRPM | DIASTOLIC BLOOD PRESSURE: 78 MMHG | HEART RATE: 85 BPM | WEIGHT: 161 LBS | RESPIRATION RATE: 15 BRPM | SYSTOLIC BLOOD PRESSURE: 135 MMHG | SYSTOLIC BLOOD PRESSURE: 158 MMHG | SYSTOLIC BLOOD PRESSURE: 122 MMHG | RESPIRATION RATE: 14 BRPM | HEART RATE: 83 BPM | RESPIRATION RATE: 17 BRPM | OXYGEN SATURATION: 97 % | RESPIRATION RATE: 17 BRPM | SYSTOLIC BLOOD PRESSURE: 122 MMHG | SYSTOLIC BLOOD PRESSURE: 149 MMHG | DIASTOLIC BLOOD PRESSURE: 77 MMHG | SYSTOLIC BLOOD PRESSURE: 127 MMHG | SYSTOLIC BLOOD PRESSURE: 116 MMHG | DIASTOLIC BLOOD PRESSURE: 70 MMHG | WEIGHT: 161 LBS | SYSTOLIC BLOOD PRESSURE: 158 MMHG | SYSTOLIC BLOOD PRESSURE: 135 MMHG | DIASTOLIC BLOOD PRESSURE: 74 MMHG | HEART RATE: 77 BPM | SYSTOLIC BLOOD PRESSURE: 124 MMHG | TEMPERATURE: 97.6 F | HEART RATE: 73 BPM | DIASTOLIC BLOOD PRESSURE: 63 MMHG | SYSTOLIC BLOOD PRESSURE: 130 MMHG | RESPIRATION RATE: 2 BRPM | SYSTOLIC BLOOD PRESSURE: 123 MMHG | DIASTOLIC BLOOD PRESSURE: 82 MMHG | DIASTOLIC BLOOD PRESSURE: 65 MMHG | RESPIRATION RATE: 18 BRPM | RESPIRATION RATE: 14 BRPM | OXYGEN SATURATION: 99 % | DIASTOLIC BLOOD PRESSURE: 63 MMHG | DIASTOLIC BLOOD PRESSURE: 70 MMHG | OXYGEN SATURATION: 97 % | HEART RATE: 79 BPM | RESPIRATION RATE: 2 BRPM | HEART RATE: 74 BPM | HEART RATE: 79 BPM | HEART RATE: 79 BPM | OXYGEN SATURATION: 96 % | HEART RATE: 67 BPM | RESPIRATION RATE: 11 BRPM | DIASTOLIC BLOOD PRESSURE: 82 MMHG | HEART RATE: 77 BPM | OXYGEN SATURATION: 97 % | HEART RATE: 80 BPM | HEART RATE: 72 BPM | RESPIRATION RATE: 15 BRPM | DIASTOLIC BLOOD PRESSURE: 70 MMHG | OXYGEN SATURATION: 98 % | SYSTOLIC BLOOD PRESSURE: 152 MMHG | SYSTOLIC BLOOD PRESSURE: 132 MMHG | SYSTOLIC BLOOD PRESSURE: 132 MMHG | RESPIRATION RATE: 2 BRPM | DIASTOLIC BLOOD PRESSURE: 72 MMHG | OXYGEN SATURATION: 98 % | OXYGEN SATURATION: 92 % | DIASTOLIC BLOOD PRESSURE: 82 MMHG | SYSTOLIC BLOOD PRESSURE: 122 MMHG | SYSTOLIC BLOOD PRESSURE: 124 MMHG | HEART RATE: 76 BPM | DIASTOLIC BLOOD PRESSURE: 71 MMHG | DIASTOLIC BLOOD PRESSURE: 74 MMHG | HEIGHT: 66 IN | HEART RATE: 83 BPM | SYSTOLIC BLOOD PRESSURE: 116 MMHG | DIASTOLIC BLOOD PRESSURE: 77 MMHG | SYSTOLIC BLOOD PRESSURE: 127 MMHG | HEART RATE: 74 BPM | DIASTOLIC BLOOD PRESSURE: 74 MMHG | HEART RATE: 73 BPM | RESPIRATION RATE: 15 BRPM | SYSTOLIC BLOOD PRESSURE: 149 MMHG | SYSTOLIC BLOOD PRESSURE: 123 MMHG | HEART RATE: 85 BPM | SYSTOLIC BLOOD PRESSURE: 138 MMHG | DIASTOLIC BLOOD PRESSURE: 68 MMHG | SYSTOLIC BLOOD PRESSURE: 158 MMHG | HEART RATE: 76 BPM | OXYGEN SATURATION: 92 % | RESPIRATION RATE: 14 BRPM | TEMPERATURE: 97.6 F | OXYGEN SATURATION: 93 % | HEART RATE: 80 BPM | SYSTOLIC BLOOD PRESSURE: 123 MMHG | HEART RATE: 85 BPM | OXYGEN SATURATION: 93 % | RESPIRATION RATE: 18 BRPM | HEART RATE: 67 BPM | SYSTOLIC BLOOD PRESSURE: 129 MMHG | OXYGEN SATURATION: 93 % | OXYGEN SATURATION: 99 % | OXYGEN SATURATION: 99 % | SYSTOLIC BLOOD PRESSURE: 138 MMHG | DIASTOLIC BLOOD PRESSURE: 63 MMHG | HEART RATE: 83 BPM | HEIGHT: 66 IN | RESPIRATION RATE: 17 BRPM | DIASTOLIC BLOOD PRESSURE: 72 MMHG | SYSTOLIC BLOOD PRESSURE: 129 MMHG | DIASTOLIC BLOOD PRESSURE: 61 MMHG | HEART RATE: 72 BPM | HEART RATE: 77 BPM | WEIGHT: 161 LBS | SYSTOLIC BLOOD PRESSURE: 132 MMHG

## 2025-04-10 PROCEDURE — RXMED WILLOW AMBULATORY MEDICATION CHARGE

## 2025-04-11 ENCOUNTER — AMBULATORY SURGICAL CENTER (OUTPATIENT)
Dept: URBAN - METROPOLITAN AREA SURGERY 12 | Facility: SURGERY | Age: 70
End: 2025-04-11
Payer: MEDICARE

## 2025-04-11 ENCOUNTER — OFFICE (OUTPATIENT)
Dept: URBAN - METROPOLITAN AREA PATHOLOGY 2 | Facility: PATHOLOGY | Age: 70
End: 2025-04-11
Payer: MEDICARE

## 2025-04-11 DIAGNOSIS — K64.4 RESIDUAL HEMORRHOIDAL SKIN TAGS: ICD-10-CM

## 2025-04-11 DIAGNOSIS — Z86.0100 PERSONAL HISTORY OF COLON POLYPS, UNSPECIFIED: ICD-10-CM

## 2025-04-11 DIAGNOSIS — R19.7 DIARRHEA, UNSPECIFIED: ICD-10-CM

## 2025-04-11 DIAGNOSIS — K63.89 OTHER SPECIFIED DISEASES OF INTESTINE: ICD-10-CM

## 2025-04-11 DIAGNOSIS — K63.5 POLYP OF COLON: ICD-10-CM

## 2025-04-11 DIAGNOSIS — A09 INFECTIOUS GASTROENTERITIS AND COLITIS, UNSPECIFIED: ICD-10-CM

## 2025-04-11 DIAGNOSIS — K57.30 DIVERTICULOSIS OF LARGE INTESTINE WITHOUT PERFORATION OR ABS: ICD-10-CM

## 2025-04-11 DIAGNOSIS — R93.3 ABNORMAL FINDINGS ON DIAGNOSTIC IMAGING OF OTHER PARTS OF DI: ICD-10-CM

## 2025-04-11 DIAGNOSIS — K64.8 OTHER HEMORRHOIDS: ICD-10-CM

## 2025-04-11 PROCEDURE — 45380 COLONOSCOPY AND BIOPSY: CPT | Mod: 59 | Performed by: INTERNAL MEDICINE

## 2025-04-11 PROCEDURE — 88305 TISSUE EXAM BY PATHOLOGIST: CPT | Performed by: PATHOLOGY

## 2025-04-11 PROCEDURE — 45385 COLONOSCOPY W/LESION REMOVAL: CPT | Performed by: INTERNAL MEDICINE

## 2025-04-12 ENCOUNTER — PHARMACY VISIT (OUTPATIENT)
Dept: PHARMACY | Facility: CLINIC | Age: 70
End: 2025-04-12
Payer: MEDICARE

## 2025-05-07 ENCOUNTER — TELEPHONE (OUTPATIENT)
Dept: BEHAVIORAL HEALTH | Facility: CLINIC | Age: 70
End: 2025-05-07

## 2025-05-07 ENCOUNTER — APPOINTMENT (OUTPATIENT)
Dept: PHARMACY | Facility: HOSPITAL | Age: 70
End: 2025-05-07
Payer: MEDICARE

## 2025-05-07 DIAGNOSIS — E11.65 TYPE 2 DIABETES MELLITUS WITH HYPERGLYCEMIA, WITH LONG-TERM CURRENT USE OF INSULIN: ICD-10-CM

## 2025-05-07 DIAGNOSIS — Z79.4 TYPE 2 DIABETES MELLITUS WITH HYPERGLYCEMIA, WITH LONG-TERM CURRENT USE OF INSULIN: ICD-10-CM

## 2025-05-07 PROCEDURE — RXMED WILLOW AMBULATORY MEDICATION CHARGE

## 2025-05-07 NOTE — PROGRESS NOTES
Patient is sent at the request of Nat Wasserman MD for my opinion regarding Type 2 diabetes.  My final recommendations will be communicated back to the requesting provider by way of shared medical record.    Subjective     Diabetes  She has type 2 diabetes mellitus. There are no hypoglycemic complications. Risk factors for coronary artery disease include diabetes mellitus, dyslipidemia and hypertension. An ACE inhibitor/angiotensin II receptor blocker is being taken.     Patient had colonoscopy, had a benign polyp and no other significant findings. GI encouraged a high fiber diet. Denies further hematochezia.     Denies side effects with current DM therapy. Denies hypoglycemic symptoms. Notices her clothes are fitting looser, has not weighed herself.     Past Medical History:  She has a past medical history of Abnormal finding on breast imaging (08/10/2015), Abnormal levels of other serum enzymes (03/11/2013), Abnormal weight gain (09/23/2019), Abnormal weight loss (02/24/2025), Acute candidiasis of vulva and vagina (02/01/2017), Acute pain of right knee (03/17/2016), Acute stress reaction (05/01/2019), Acute upper respiratory infection, unspecified (12/07/2017), Adverse effect of insulin and oral hypoglycemic (antidiabetic) drugs, initial encounter (12/08/2019), Allergy status to unspecified drugs, medicaments and biological substances (02/14/2019), Anxiety, Arthritis, Arthritis of knee, right (05/09/2023), Benign and innocent cardiac murmurs, Bilateral primary osteoarthritis of knee (04/27/2016), Body mass index (BMI) 29.0-29.9, adult (02/03/2020), Body mass index (BMI) 29.0-29.9, adult (03/18/2020), Decreased range of motion (ROM) of knee (03/17/2016), Dependent relative needing care at home (06/13/2017), Diaphragmatic hernia without obstruction or gangrene (10/21/2015), Effusion, unspecified knee (06/19/2014), Encounter for general adult medical examination without abnormal findings (02/03/2020), Encounter  for immunization (10/20/2016), Encounter for immunization, Encounter for other preprocedural examination (01/31/2016), Eyelid retraction right lower eyelid (05/09/2023), Hematuria (05/09/2023), Hypertension, Impacted cerumen, left ear (06/24/2019), Laxity of eyelid (05/09/2023), Long term (current) use of antibiotics (04/21/2015), Long term (current) use of insulin (Multi) (07/11/2021), Major depressive disorder, 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 psychotic features (Multi) (04/11/2021), Major depressive disorder, 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 psychotic features (Multi) (07/14/2020), Major depressive disorder, recurrent, moderate (02/03/2020), Metformin adverse reaction (05/09/2023), Obstructive sleep apnea (adult) (pediatric) (07/06/2021), Osteoarthritis of right knee (01/23/2015), Other abnormal and inconclusive findings on diagnostic imaging of breast (09/18/2017), Other conditions influencing health status (06/13/2017), Other long term (current) drug therapy (01/02/2022), 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), Other specified abnormal immunological findings in serum (03/11/2013), Other specified counseling (07/22/2020), Other specified counseling (06/25/2020), Other specified disorders of nose and nasal sinuses (01/30/2015), Other specified symptoms and signs involving the circulatory and respiratory systems (07/21/2016), Overweight, Pain in unspecified knee, Pain in unspecified knee (02/21/2014), Papillomavirus as the cause of diseases classified elsewhere, Personal history of diseases of the blood and blood-forming organs and certain disorders involving the immune mechanism, Personal history of diseases of the skin and subcutaneous tissue, Personal history of other diseases of the circulatory system, Personal history of other diseases of the respiratory system (03/15/2018), Personal history of other drug therapy, Personal history of other drug therapy (09/18/2017), Personal history of other endocrine,  nutritional and metabolic disease (10/02/2018), Personal history of other mental and behavioral disorders (04/10/2019), Personal history of other specified conditions (01/19/2018), Personal history of other specified conditions (09/23/2019), Personal history of other specified conditions (07/22/2015), Personal history of other specified conditions (03/17/2017), Personal history of pneumonia (recurrent), Personal history of urinary (tract) infections, Personal history of urinary (tract) infections, Postpartum depression, Problem related to primary support group, unspecified (06/05/2019), Pulmonary hypertension (Multi) (05/09/2023), Rash and other nonspecific skin eruption (06/24/2013), Right leg weakness (03/17/2016), Type 2 diabetes mellitus with hyperglycemia (Multi) (12/21/2020), Type 2 diabetes mellitus with hyperglycemia, with long-term current use of insulin (01/23/2015), Unilateral primary osteoarthritis, right knee (01/27/2016), Unspecified exophthalmos (12/08/2019), Urethral caruncle (05/09/2023), Urinary tract infection, Urinary tract infection, site not specified (07/11/2021), Varicella, and Vitamin D deficiency, unspecified (10/05/2022).    Past Surgical History:  She has a past surgical history that includes Colonoscopy (05/29/2012); Esophagogastroduodenoscopy (05/29/2012); Other surgical history (Left, 2016); Total knee arthroplasty (07/22/2015); Other surgical history (02/03/2020); Total knee arthroplasty (04/21/2015); Joint replacement (2014); and [place] tooth extraction.    Social History:  She reports that she has never smoked. She has never been exposed to tobacco smoke. She has never used smokeless tobacco. She reports current alcohol use. She reports that she does not use drugs.    Family History:  Family History   Problem Relation Name Age of Onset    Hypertension Mother Lindsay     Transient ischemic attack Mother Lindsay     Diverticulitis Mother Lindsay     Diverticulosis Mother Lindsay      Other (bladder cancer) Mother Lindsay     Hearing loss Mother Lindsay     Hypertension Father Fariha     Diabetes Father Fariha     Diabetes Son Kurtis     Hearing loss Son Kurtis        Allergies:  Ciprofloxacin, Diclofenac sodium, Erythromycin base, and Naproxen    Current diet: 3 meals per day, 1-2 times per week of fast food (was worse over the summer)  Breakfast: cereal with blueberries  Lunch: turkey sandwich with fruit and yogurt  Dinner: protein, vegetable, and carbohydrate  Snack: 2-3 times per day - was grabbing cookies has now transitioned to wheat thins with cheese or a piece of fruit  Drink: water or diet soda pop (1-2 cans), coffee in the morning    Current exercise: walking twice a week for 15 minutes midday    The patient does have a known family history of diabetes.    Patient is using: continuous glucose monitor  The patient is currently checking the blood glucose continuously.    Hypoglycemia frequency: rare  Hypoglycemia awareness: Yes     CGM Data  5/7/2025  Time in ranges     7 day  Above: 20%  In range: 72%  Low: 8%   14 day  Above: 19%  In range: 74%  Low: 7%     Average glucose Time CGM Active   7 days average: 120 mg/dL  14 day average: 120 mg/dL  30 day average: 112 mg/dL 7 day  Scans per day: 4  Sensor data captured: 86%     3/12/2025  Average glucose   7 days average: 107 mg/dL  14 day average: 110 mg/dL  30 day average: 123 mg/dL      CGM Data 3/12/2025  7 days average: 125 mg/dL  14 day average: 133 mg/dL  30 day average: 135 mg/dL    Adverse Effects: denies     Objective     Last Recorded Vitals:  BP Readings from Last 6 Encounters:   02/24/25 104/54   11/25/24 113/65   08/12/24 100/52   05/15/24 112/52   03/18/24 109/70   12/12/23 96/52        Wt Readings from Last 6 Encounters:   03/07/25 73.9 kg (163 lb)   02/24/25 73.9 kg (163 lb)   11/25/24 76 kg (167 lb 9.6 oz)   08/12/24 77.1 kg (170 lb)   05/15/24 80.7 kg (178 lb)   03/18/24 78 kg (172 lb)       Diabetes  "Pharmacotherapy:  Mounjaro 12.5 mg once weekly  Farxiga 10 mg daily  Lantus 24 units in the morning    Previous DM therapy:  Metformin - diarrhea  Trulicity - alternative therapy with Mounjaro    Primary/Secondary Prevention   - Statin? Yes  - ACE-I/ARB? Yes  - Aspirin? No    Pertinent PMH Review:  - PMH of Pancreatitis: No  - PMH of Retinopathy: No  - PMH of Urinary Tract Infections: Yes, 3-4 years ago, does not have them often  - PMH of MTC: No    Lab Review  Lab Results   Component Value Date    BILITOT 0.6 02/13/2025    CALCIUM 10.1 02/13/2025    CO2 27 02/13/2025    CL 99 02/13/2025    CREATININE 0.84 02/13/2025    GLUCOSE 141 (H) 02/13/2025    ALKPHOS 61 02/13/2025    K 4.3 02/13/2025    PROT 7.1 02/13/2025     02/13/2025    AST 17 02/13/2025    ALT 22 02/13/2025    BUN 22 02/13/2025    ANIONGAP 11 02/13/2025    MG 2.28 09/05/2023    ALBUMIN 4.6 02/13/2025    GFRF 82 09/05/2023     Lab Results   Component Value Date    TRIG 160 (H) 02/13/2025    CHOL 145 02/13/2025    LDLCALC 81 02/13/2025    HDL 38 (L) 02/13/2025     Lab Results   Component Value Date    HGBA1C 6.9 (A) 02/24/2025    HGBA1C 8.5 (A) 11/25/2024    HGBA1C 8.1 (A) 08/12/2024     No components found for: \"UACR\"  The 10-year ASCVD risk score (Elizabeth DK, et al., 2019) is: 17%    Values used to calculate the score:      Age: 70 years      Sex: Female      Is Non- : No      Diabetic: Yes      Tobacco smoker: No      Systolic Blood Pressure: 104 mmHg      Is BP treated: Yes      HDL Cholesterol: 38 mg/dL      Total Cholesterol: 145 mg/dL    Health Maintenance:   Foot Exam: PCP checks at yearly physical, patient denies cuts or wounds on feet  Eye Exam: 9/2023  Urine Albumin: not available   Influenza Immunization: 11/21/2024  Pneumonia Immunization: up to date    Drug Interactions:  No significant drug interactions identified at this time    Assessment/Plan   Problem List Items Addressed This Visit    None  Visit Diagnoses  "        Type 2 diabetes mellitus with hyperglycemia, with long-term current use of insulin        Relevant Orders    Referral to Clinical Pharmacy        Patients diabetes borderline controlled with most recent A1c of 6.9% (Goal < 7%). BG trends appear to be within goal range. Though reports shows low BG, patient was asymptomatic. Patient would prefer not to increase GLP-1 dose today, therefore will continue current therapy.   Continue  Mounjaro 12.5 mg once weekly   Farxiga 10 mg daily  Lantus 24 units injected once daily in the morning  Scan CGM 4 times daily (morning, twice during the day, and at bedtime)  Follow-up: 6/25/2025 at 9:40 am via phone  PCP Follow-Up: 5/21/2025    Continue all meds under the continuation of care with the referring provider and clinical pharmacy team.

## 2025-05-08 ENCOUNTER — PHARMACY VISIT (OUTPATIENT)
Dept: PHARMACY | Facility: CLINIC | Age: 70
End: 2025-05-08
Payer: MEDICARE

## 2025-05-08 DIAGNOSIS — F42.8 OBSESSIVE THINKING: ICD-10-CM

## 2025-05-08 DIAGNOSIS — F41.9 ANXIETY: ICD-10-CM

## 2025-05-08 RX ORDER — BUSPIRONE HYDROCHLORIDE 5 MG/1
TABLET ORAL
Qty: 60 TABLET | Refills: 1 | Status: SHIPPED | OUTPATIENT
Start: 2025-05-08

## 2025-05-11 NOTE — PROGRESS NOTES
Subjective   Reason for Visit: Alexia Cho is an 70 y.o. female here for a Medicare Wellness Visit, Annual Physical, and follow up of conditions.   Past Medical, Surgical, and Family History reviewed and updated in chart.  Reviewed all medications by prescribing practitioner or clinical pharmacist (such as prescriptions, OTCs, herbal therapies and supplements) and documented in the medical record.  (LAST VISIT PLAN FROM: 02/24/2025:  Patient Instructions:  Constipation related to meds:  Increase metamucil to 4 or 5 caps daily with at least 8 oz water. Use 3 caps daily this week, then 4 caps daily next week and can increase to 5 daily if needed.  Surfak stool softener can also be used as needed for hard stool. (Docusate calcium) or colace (docusate sodium)  Keep up with fluids, at least 64 oz daily and most of that water.  Non fasting blood test in August  to monitor medications and recheck hemoglobin which was slightly elevated likely because you had not had any fluids yet the day of your February blood test.  Follow up as scheduled, next visit in may is your wellness exam with pelvic (not screening pap test).  The spironolactone should be continued.  Will forward your coverage letters to [employer] to see about changing the lantus to a different brand.  Recommend rsv vaccination, one time only. Tetanus booster or tdap due if you get a tetanus prone wound, otherwise will be due routinely in 2026 fyi.  Schedule vascular KEVIN with exercise in the next few months in cardiology downstairs.  Schedule mammogram which is due.  14 orders placed.    END INFO FROM PRIOR VISIT)  HPI  Health maintenance items last completed:  Colonoscopy: 04/15/2025; diverticulosis, 1 polyp, mild patchy colitis, internal and external hemorrhoids.   Mammogram: 05/14/2025; No mammographic evidence of malignancy.  BI-RADS Category:  1 Negative.  Recommendation:  Annual Screening. Due 05/14/2026.  Bone Density: 04/09/2021; normal.  Due  04/09/2025.  Urine albumin if HTN or DM2: Albumin, urine was 0.2 on 02/13/2025.  Pap: 05/09/2023- negative.   Pelvic: Saw URO/GYN 09/2020 for caruncle and atrophy.  Topical estrogen recommended.  Patient is using vaginal estrogen twice per week but has not used this for the past 2 months due to not feeling well.  Breast exam in office: Today.  Vaccines due or not completed: UTD except for second dose of Shingrix,  RSV recommended.  Last Health Maintenance exam: 05/15/2024.    Patient Care Team:  Nat Wasserman MD as PCP - General  Nat Wasserman MD as PCP - [employer]age PCP  Jeramy Phelps MD PhD as Psychiatrist (Geriatric Psychiatry)  MANOHAR Murrieta-CNP as Nurse Practitioner (Sleep Medicine)  Didier Terrell MD PhD as Physician Assistant (Obstetrics and Gynecology)  Elva VALDOVINOS MD as Surgeon (Gastroenterology)  Ziyad Caruso MD as Consulting Physician (Gastroenterology)     I reviewed current and past Medical, Surgical and Family History, as well as allergies and updated the medical record.    I reviewed the questions and answers of the Medicare Wellness Visit form including screenings for depression, alcohol, and fall risk.    I reviewed medications from this prescribing practitioner, outside practitioners, clinical pharmacist and patient directed including OTCs, herbal therapies and supplements, as well as prescriptions. These are documented in the medical record.      I discussed code status with the patient, and they understand the difference between full code and DNR.  Patient is full code.  I discussed HCPOA and LW. Her first HCPOA is her , [facility].    I reviewed and updated the patient's Care Team in the chart.    Patient reports having regular eye and dental examinations.    We discussed her latest blood work on 03/27/2025 which showed elevated WBC.  Will recheck CBC and electrolytes.    Health Maintenance:  Recommended obtaining the second dose of  the Shingrix vaccine and RSV vaccine.  Covid booster and influenza vaccine Fall 2025.      Hypertension:  Aortic Valve Sclerosis:  BP today is 104/66.  Follow up on cardiovascular conditions:    Cardiac:   Patient continues on amlodipine 10 mg daily, metoprolol succinate XL 50 mg daily, ramipril 10 mg every 12 hours, spironolactone 25 mg daily, and hydrochlorothiazide 12.5 mg daily. Denies palpitations. Advised to monitor BP at home weekly, and if systolic is 100 or lower, we can adjust her medication possibly holding hydrochlorothiazide.  Chest pain?No  Palpitations? No  Increased luna?  No  Other:  Resp:   Shortness of breath?No  Wheezing No  Cough No  Other:  Extremities:   Leg or ankle swelling?No   Claudication?No  Other:  Neuro:  DizzinessNo  SyncopeNo   Blurred visionNo  New headaches No  Other:  Medications:Taking them regularly.Yes  Side effects.No    Hyperlipidemia:  No myalgias, nausea, abdominal pain.  Meds: Taking regularly, no adverse effect.  Patient continues on atorvastatin 20 mg daily.  Labs:  Last LDL direct was 93 mg/dL with triglycerides at 160 mg/dL on 02/13/2025.  LDL goal: <100 mg/dL.  ASCVD risk 17%.    Diabetes:  Type II:  A1c today was 6.0%.  Is taking medications regularly, denies side effects.  Patient continues on Farxiga 10 mg daily, Lantus U-100 insulin 24 units at lunch time (Pharm D) and Mounjaro below.  Denies hypoglycemia, polyuria, polydipsia.  No new numbness or paresthesias of extremities. No syncope or dizziness. No blurred vision.  Lab Results   Component Value Date    HGBA1C 6.0 05/21/2025    HGBA1C 6.9 (A) 02/24/2025    HGBA1C 8.5 (A) 11/25/2024     Lab Results   Component Value Date    LDLCALC 81 02/13/2025    CREATININE 0.84 02/13/2025      Lab Results   Component Value Date    GLUCOSE 141 (H) 02/13/2025    CALCIUM 10.1 02/13/2025     02/13/2025    K 4.3 02/13/2025    CO2 27 02/13/2025    CL 99 02/13/2025    BUN 22 02/13/2025    CREATININE 0.84 02/13/2025      No  results found for this or any previous visit (from the past 4464 hours).     GLP-1 agonists (Mounjaro 12.5 mg injection every 7 days (Started in February): No vomiting, abdominal pain and anterior neck pain/swelling.  Patient has lost 15 pounds since 03/07/2025.  Will decrease her dosage of Mounjaro to 10 mg.  She was taking this dosage in January.  We discussed that we can further reduce to 7.5 mg if needed.  I sent a message to Pharm D to expedite the lower dose.  Patient inquired about using Tums.  I reviewed her medication list with no contraindication found.  Denies dysphagia.  She states she requires these more frequently since her anxiety has flared.  We discussed that this may improve with decreasing her Mounjaro.    We discussed her diet.  Patient reports that she will have a poached egg for breakfast and toast, a turkey sandwich, banana and yogurt for lunch and [employer] for dinner.  Advised to refrain from eating [employer].    SGLT-2 inhibitors (Farxiga 10 mg daily): No hematuria, dysuria, or yeast infections.    Continuous Glucose Monitor Data:  Duration (days) %Above Target % In Range %Low     Average glucose Usage rate/scans per day    Set Target Range (low-high)  7 days   20%   75%  5%    14 days    30 days   22%   72%  6%    90 days  22%   72%  6%  Any lows symptomatic?  Low glucose events: None in the last week but occur usually in the evening, especially, between midnight and 6 pm. Lowest being 68, as per patient.    We discussed that if she starts having more low sugars, she can decrease insulin by 4 units daily, or call if you are not sure what to do.     Anxiety:  Depression:  Patient continues on BuSpar 5 mg daily, Cymbalta 60 mg X2 daily and Remeron 30 mg nightly.  Patient follows with Dr. Phelps, Behavioral Health, with upcoming appointment on 06/02/2025.  Patient scored quite high on her PHQ-9 and JORGE A-7 today, and messaged Dr. Phelps on 05/08/2025 and was prescribed BuSpar 5 mg daily to add to her  current regimen with follow up appointment 06/02/2025.      We discussed that this is a starting dose.  Recommended asking Dr. Phelps if she can increase.  Denies SI.  She states her  is very supportive, and she tries to keep busy with doing housework/cleaning.  She reports worrying about her grandson (age 13) who has difficulty at school with peers hiding on him and him walking around alone and feels sad for him.     We discussed speaking with a therapist.  Patient reports that she did speak with a therapist , Sandeep, a few years ago.  We discussed reconnecting with her, or I can refer to Behavioral Health here if needed.  She has not seen a therapist at Dr. Phelps office.  Patient is in agreement of seeing Behavioral Health, Sveta Rowe here.  Order placed.     She states that her presentation to the ER for rectal bleeding may have triggered her anxiety/depression.  She underwent colonoscopy on 04/07/2025 which showed active infectious colitis.  She was recommended Metamucil 3 tablets daily.  We discussed that this can be increased to 5 daily and can take 3 in the AM and 2 in the PM.  Patient denies adverse effects.  Denies rectal bleeding since the ER but experienced some bleeding after her colonoscopy.  She reports having regular bowel movements now but experiences urgency but was having constipation prior due to Mounjaro use.      She states she also had a yeast infection and treated it with Monistat but it recurred and is now treating with Monistat again.  Last used last night.      She is losing weight.     Orders placed for prescriptions as required.    Orders placed for blood work as required.    Follow up 08/25/2025 for weight check.    Review of Systems  All systems reviewed and are negative unless otherwise stated in HPI or noted in writing on the written   3  page history and review of systems document reviewed by me and  scanned in with this visit. This is scanned either to notes or to media, with  today's date.    Objective   The following test results have been reviewed:  Latest Complete Lab Results:  Recent Data from [facility]  Related to COMPREHENSIVE METABOLIC PANEL  Component 03/27/25 02/09/16 01/19/16 01/19/16 01/23/15 02/26/10   Protein, Total 7.7 -- -- -- -- 6.9   Albumin 4.6 -- 4.5 -- -- 4.1    Calcium, Total 9.7 -- -- -- -- --   Bilirubin, Total 0.7 -- -- -- -- 0.7   Alkaline Phosphatase 86 -- -- -- -- 52   AST 19 -- -- -- -- 21   ALT 26 -- -- -- -- 29   Glucose 186 High   173 High  -- 150 High  188 High  149 High     BUN 23 High  9 -- 15 13 13   Creatinine 0.73 0.54 Low  -- 0.78 0.56 Low  0.6   Sodium 138 138 -- 141 140 142   Potassium 4.2 4.0 -- 3.7 3.5 4.3   Chloride 100 98 -- 99 99 104   CO2 23 31 -- 24 28 29   Anion Gap 15 9 -- 18 High  13 --   Estimated Glomerular Filtration Rate 89  -- -- -- -- --   View all related data   Recent Data from [facility]  Related to COMPLETE BLOOD COUNT AND DIFFERENTIAL  Component 03/27/25 02/10/16 02/09/16 01/19/16 02/05/15 02/04/15   WBC 25.51 High  9.77 14.97 High  8.24 8.83 10.92   RBC 5.21 High  3.61 Low  4.08 4.71 3.67 Low  3.84 Low    Hemoglobin 16.3 High  11.1 Low  12.7 14.7 11.3 Low  11.7   Hematocrit 46.7 High  32.7 Low  36.5 41.9 33.2 Low  34.7 Low    MCV 89.6 90.6 89.5 89.0 90.5 90.4   MCH 31.3 30.7 31.1 31.2 30.8 30.5   MCHC 34.9 33.9 34.8 35.1 34.0 33.7   RDW-CV 12.9 13.6 13.2 13.2 13.4 13.5   Platelet Count 327 177 216 247 185 194   MPV 11.2 12.2 11.7 11.6 11.9 11.4   NRBC 0 -- -- -- -- --   Absolute nRBC <0.01 -- -- -- -- --   Neutrophils % 81 -- -- -- -- --   Abs Neut (Segs + Bands) 20.66 High  -- -- -- -- --   Lymphocytes % 9 -- -- -- -- --   Abs Lymph (Normal + Reactive) 2.3 -- -- -- -- --   Monocytes % 9 -- -- -- -- --   Abs Mono 2.3 High  -- -- 0.62 -- --   Eosin% 0 -- -- 1.3 -- --   Abs Eosin 0 -- -- 0.11 -- --   Basophils % 1 -- -- -- -- --   Abs Baso 0.26 High  -- -- 0.03 -- --   Platelet Estimate Adequate -- -- -- -- --   Red  Cell Morph Reviewed: unremarkable -- -- -- -- --   Diff Type Manual -- -- Auto Diff -- --   View all related data     CBC w/Diff:   Lab Results   Component Value Date    WBC 7.8 02/13/2025    NRBC 0.0 12/15/2023    RBC 5.16 (H) 02/13/2025    HGB 16.1 (H) 02/13/2025    HCT 48.2 (H) 02/13/2025    MCV 93.4 02/13/2025    MCHC 33.4 02/13/2025     02/13/2025    RDW 12.5 02/13/2025    NEUTOPHILPCT 57.4 12/15/2023    IGPCT 0.4 12/15/2023    LYMPHOPCT 24.7 02/13/2025    MONOPCT 9.7 02/13/2025    EOSPCT 2.6 02/13/2025    BASOPCT 0.5 02/13/2025    NEUTROABS 4.14 12/15/2023    LYMPHSABS 1.93 12/15/2023    MONOSABS 0.81 12/15/2023    EOSABS 203 02/13/2025    BASOSABS 39 02/13/2025      CMP:  Lab Results   Component Value Date    GLUCOSE 141 (H) 02/13/2025     02/13/2025    K 4.3 02/13/2025    CL 99 02/13/2025    CO2 27 02/13/2025    ANIONGAP 11 02/13/2025    BUN 22 02/13/2025    CREATININE 0.84 02/13/2025    GFRF 82 09/05/2023    CALCIUM 10.1 02/13/2025    ALBUMIN 4.6 02/13/2025    ALKPHOS 61 02/13/2025    PROT 7.1 02/13/2025    AST 17 02/13/2025    BILITOT 0.6 02/13/2025    ALT 22 02/13/2025      Lipid:   Lab Results   Component Value Date    CHOL 145 02/13/2025    HDL 38 (L) 02/13/2025    CHHDL 3.8 02/13/2025    LDLF 82 01/20/2023    VLDL 21 12/15/2023    TRIG 160 (H) 02/13/2025     LDL Direct:  Lab Results   Component Value Date    LDLDIRECT 93 02/13/2025      TSH:   Lab Results   Component Value Date    TSH 1.40 02/13/2025      B12:  Lab Results   Component Value Date    BYEAQGWD01 990 02/13/2025     Vitamin D:  Lab Results   Component Value Date    VITD25 51 02/13/2025      HgA1c:   Lab Results   Component Value Date    HGBA1C 6.0 05/21/2025    BIEUJJOO5B 169 03/14/2024     Vitals:      3/18/2024    11:56 AM 5/15/2024    10:40 AM 8/12/2024     9:39 AM 11/25/2024    10:15 AM 2/24/2025     8:12 AM 3/7/2025    10:24 AM 5/21/2025     9:45 AM   Vitals   Systolic 109 112 100 113 104  104   Diastolic 70 52 52 65 54   "66   BP Location  Right arm        Heart Rate 80 60 60 89 64  70   Temp    36.9 °C (98.4 °F)      Resp  18 18 16 18  18   Height 1.702 m (5' 7\") 1.676 m (5' 6\") 1.664 m (5' 5.5\") 1.651 m (5' 5\") 1.651 m (5' 5\") 1.651 m (5' 5\") 1.651 m (5' 5\")   Weight (lb) 172 178 170 167.6 163 163 148   BMI 26.94 kg/m2 28.73 kg/m2 27.86 kg/m2 27.89 kg/m2 27.12 kg/m2 27.12 kg/m2 24.63 kg/m2   BSA (m2) 1.92 m2 1.94 m2 1.89 m2 1.87 m2 1.84 m2 1.84 m2 1.75 m2     Physical Exam   General: Patient is known to me, looks well, is in usual state of health, with no obvious distress.  Head: Normocephalic  Eyes: PERRL, EOMI.  No scleral icterus or conjunctival abnormality  Ears: Normal canals and TM's.  Oropharynx: Well hydrated mucosa. no lesions, erythema. palate moves well.  Neck: No masses, no cervical (A/P/ or Lateral) adenopathy. Thyroid not enlarged and no nodules. Carotid pulses normal and no bruits.  Chest: Normal AP diameter. No supraclavicular adenopathy.  Lungs: Clear to auscultation: no rales , wheezes, rhonchi, rubs, examined bilaterally, ant/post /lateral. RR normal.  Heart: RRR. No MGCR S1 S2 normal.  Abdomen: BS normal, no bruits. No hepatosplenomegaly. No abdominal mass or tenderness. No distension.  Extremities: No upper extremity edema. No lower leg edema. No ischemia.   Joints: No synovitis seen. No deformities.  Pulses: Normal posterior tibialis pulses, normal dorsalis pedis pulses. Normal radial pulses. Normal femoral pulses without bruits.  Neuro: CN 2-12 intact . Alert, oriented, appropriate.  No focal weakness observed. No tremors. Ambulation is normal .  Psych: Mood and affect normal. No cognitive deficits noted during this exam. Alert, oriented, appropriate.  Skin: No rash, petechiae or excessive bruising.  Lymph: No SC axillary or inguinal adenopathy    Breast Exam : Symmetric appearance. No masses, nipple discharge, skin retraction, axillary or supraclavicular adenopathy.    Gyn: Normal pelvic exam: External " "Genitalia without lesions. Urethra normal. Speculum exam: no lesions or vaginal discharge, cervix without lesions but vaginal mucosa is reddened. Bimanual exam: no uterine masses. No ovarian masses. No anal/perineal lesions. Recto vaginal exam normal. (Patient declined chaperone)  Advised to finish the Monistat (this is her second course), and I will place an order for Diflucan 100 mg, one tablet orally daily for 3 days. Advised if symptoms persist beyond a week or if they recur in the next 2 weeks, she can refill it and take one per day for 3 days again.     Diabetic Foot Exam:  Skin normal.  No lesions.  Left DP at 1+, right DP at 2+.  PT pulses within normal limits.  Callus: None  Deformities: None.  Monofilament Testing: Normal.  Patient underwent vascular testing on 03/17/2025 which was WNL.      Assessment/Plan   Problem List Items Addressed This Visit       Anxiety    Relevant Orders    Follow Up In [facility]    Aortic valve sclerosis    Relevant Medications    metoprolol succinate XL (Toprol-XL) 50 mg 24 hr tablet    Hyperlipidemia due to type 2 diabetes mellitus (Multi)    Relevant Medications    atorvastatin (Lipitor) 20 mg tablet    High cholesterol    Relevant Medications    atorvastatin (Lipitor) 20 mg tablet    Hypertension, essential - Primary    Relevant Medications    metoprolol succinate XL (Toprol-XL) 50 mg 24 hr tablet    Controlled type 2 diabetes mellitus with hyperglycemia, with long-term current use of insulin (Multi)    Relevant Medications    BD Insulin Syringe, half unit, 0.3 mL 31 gauge x 5/16\" syringe    tirzepatide (Mounjaro) 10 mg/0.5 mL pen injector    Other Relevant Orders    POCT glycosylated hemoglobin (Hb A1C) manually resulted (Completed)    Comprehensive metabolic panel    Moderately severe recurrent major depression (Multi)    Relevant Orders    Follow Up In [facility] "     Other Visit Diagnoses         Hypertension, unspecified type          Abnormal weight loss        Relevant Orders    T3, free    T4, free    TSH    Comprehensive metabolic panel      Unintentional weight loss        Relevant Orders    T3, free    T4, free    TSH      Leukocytosis, unspecified type        Relevant Orders    CBC and Auto Differential      Focal active colitis        Relevant Orders    CBC and Auto Differential    Comprehensive metabolic panel      Yeast vaginitis        Relevant Medications    fluconazole (Diflucan) 100 mg tablet               Scribe Attestation  By signing my name below, Jayla REYES Scribe   attest that this documentation has been prepared under the direction and in the presence of Nat Wasserman MD.    Comprehensive metabolic panel; Future  -     Comprehensive metabolic panel  Yeast vaginitis  -     fluconazole (Diflucan) 100 mg tablet; Take 1 tablet (100 mg) by mouth once daily for 3 days. Ca repeat one week later if not improved.  Encounter for subsequent annual wellness visit (AWV) in Medicare patient  Annual visit for general adult medical examination with abnormal findings  Comments:  small goter, wt loss    Annual  history and physical completed including  ROS, PE.   Medicare wellness visit  completed including:  Immunizations,   Health Maintenance items,  Discussion of advanced directives and code status, which is: full code, ok cpr  Fall risk and prevention addressed.  Functionality and pain were assessed.   Cancer screening testing was addressed as appropriate-see patient discussion/orders.   Medications were discussed and reviewed/reconciled and refill need assessed/handled.   Patient states taking their medication regularly and appropriately.  Also:   Depression screening PhQ-2 completed: positive and positive anxiety -is on medication, denies suicidal ideation. Has psychiatry appt. Referred to therapy as well. See discussion in hpi  Alcohol misuse screen: neg    In addition to the wellness visit and screening, the above medical issues were addressed:  As well as results of testing completed since last visit.  Type  2 dm stable, will cut back on mounjaro with abnl weight loss. Watch for low sugars, I reviewed her cgm. She has not had symptomaic hypoglycemia but may need to decrease insulin if weight.pi   loss continues    Patient Instructions:     Do not take the 12,5 mg Mounjaro this Friday. We reduced your dose to 10 mg weekly starting either Friday or as soon as it arrives from .    If you start having more low sugars , then decrease insulin by 4 units daily, or call if you are not sure what to do.    Metoprolol succinate/Toprol:: Sent 50 mg tabs and take one per day. This replaces the 1/2 tab of  100 mg daily.    Increase metamucil to 5 caps daily can split the dose.    Follow up for weight check and pelvic exam (routine) in 4-6 weeks.    Shingrix 2nd dose due.  We note that you did not have one in 2021, but had fist dose in October 2024.    RSV vaccine recommended this year , can be August (or sooner if RSV flares in the area)  Flu and covid boosters fall 2025.    Yeast: fluconazole: one tab by mouth daily for 3 days. If symptoms persist beyond a week or if they re occur in the next 2 weeks, can refill it and take one per day for 3 days again. Also finish the current vaginal monistat you are using.    Watch bp at home, checl at least weekly, if top number is 100 or lower call about adjusting medication , possibly holding hydrochlorothiazide.    Scribe Attestation  By signing my name below, IJayla, Scribe   attest that this documentation has been prepared under the direction and in the presence of Nat Wasserman MD.

## 2025-05-14 ENCOUNTER — HOSPITAL ENCOUNTER (OUTPATIENT)
Dept: RADIOLOGY | Facility: CLINIC | Age: 70
Discharge: HOME | End: 2025-05-14
Payer: MEDICARE

## 2025-05-14 DIAGNOSIS — R92.8 ABNORMAL MAMMOGRAM: ICD-10-CM

## 2025-05-14 PROCEDURE — 77066 DX MAMMO INCL CAD BI: CPT | Mod: RSC

## 2025-05-21 ENCOUNTER — APPOINTMENT (OUTPATIENT)
Dept: PRIMARY CARE | Facility: CLINIC | Age: 70
End: 2025-05-21
Payer: MEDICARE

## 2025-05-21 VITALS
DIASTOLIC BLOOD PRESSURE: 66 MMHG | WEIGHT: 148 LBS | SYSTOLIC BLOOD PRESSURE: 104 MMHG | HEIGHT: 65 IN | HEART RATE: 70 BPM | BODY MASS INDEX: 24.66 KG/M2 | RESPIRATION RATE: 18 BRPM

## 2025-05-21 DIAGNOSIS — Z79.4 CONTROLLED TYPE 2 DIABETES MELLITUS WITH HYPERGLYCEMIA, WITH LONG-TERM CURRENT USE OF INSULIN (MULTI): ICD-10-CM

## 2025-05-21 DIAGNOSIS — D72.829 LEUKOCYTOSIS, UNSPECIFIED TYPE: ICD-10-CM

## 2025-05-21 DIAGNOSIS — F41.1 GENERALIZED ANXIETY DISORDER: ICD-10-CM

## 2025-05-21 DIAGNOSIS — I10 HYPERTENSION, ESSENTIAL: Primary | ICD-10-CM

## 2025-05-21 DIAGNOSIS — F33.2 MODERATELY SEVERE RECURRENT MAJOR DEPRESSION (MULTI): ICD-10-CM

## 2025-05-21 DIAGNOSIS — Z78.9 FULL CODE STATUS: ICD-10-CM

## 2025-05-21 DIAGNOSIS — I10 HYPERTENSION, UNSPECIFIED TYPE: ICD-10-CM

## 2025-05-21 DIAGNOSIS — R63.4 UNINTENTIONAL WEIGHT LOSS: ICD-10-CM

## 2025-05-21 DIAGNOSIS — I35.8 AORTIC VALVE SCLEROSIS: ICD-10-CM

## 2025-05-21 DIAGNOSIS — E78.5 HYPERLIPIDEMIA DUE TO TYPE 2 DIABETES MELLITUS (MULTI): ICD-10-CM

## 2025-05-21 DIAGNOSIS — E11.69 HYPERLIPIDEMIA DUE TO TYPE 2 DIABETES MELLITUS (MULTI): ICD-10-CM

## 2025-05-21 DIAGNOSIS — E78.00 HIGH CHOLESTEROL: ICD-10-CM

## 2025-05-21 DIAGNOSIS — B37.31 YEAST VAGINITIS: ICD-10-CM

## 2025-05-21 DIAGNOSIS — Z00.01 ANNUAL VISIT FOR GENERAL ADULT MEDICAL EXAMINATION WITH ABNORMAL FINDINGS: ICD-10-CM

## 2025-05-21 DIAGNOSIS — K52.9 FOCAL ACTIVE COLITIS: ICD-10-CM

## 2025-05-21 DIAGNOSIS — E11.65 CONTROLLED TYPE 2 DIABETES MELLITUS WITH HYPERGLYCEMIA, WITH LONG-TERM CURRENT USE OF INSULIN (MULTI): ICD-10-CM

## 2025-05-21 DIAGNOSIS — R63.4 ABNORMAL WEIGHT LOSS: ICD-10-CM

## 2025-05-21 DIAGNOSIS — E11.9 ENCOUNTER FOR DIABETIC FOOT EXAM (MULTI): ICD-10-CM

## 2025-05-21 DIAGNOSIS — Z00.00 ENCOUNTER FOR SUBSEQUENT ANNUAL WELLNESS VISIT (AWV) IN MEDICARE PATIENT: ICD-10-CM

## 2025-05-21 LAB
ALBUMIN SERPL-MCNC: 4.7 G/DL (ref 3.6–5.1)
ALP SERPL-CCNC: 49 U/L (ref 37–153)
ALT SERPL-CCNC: 24 U/L (ref 6–29)
ANION GAP SERPL CALCULATED.4IONS-SCNC: 11 MMOL/L (CALC) (ref 7–17)
AST SERPL-CCNC: 21 U/L (ref 10–35)
BILIRUB SERPL-MCNC: 0.7 MG/DL (ref 0.2–1.2)
BUN SERPL-MCNC: 20 MG/DL (ref 7–25)
CALCIUM SERPL-MCNC: 10.3 MG/DL (ref 8.6–10.4)
CHLORIDE SERPL-SCNC: 100 MMOL/L (ref 98–110)
CO2 SERPL-SCNC: 27 MMOL/L (ref 20–32)
CREAT SERPL-MCNC: 0.73 MG/DL (ref 0.6–1)
EGFRCR SERPLBLD CKD-EPI 2021: 88 ML/MIN/1.73M2
GLUCOSE SERPL-MCNC: 132 MG/DL (ref 65–99)
POC HEMOGLOBIN A1C: 6 % (ref 4.2–6.5)
POTASSIUM SERPL-SCNC: 4.2 MMOL/L (ref 3.5–5.3)
PROT SERPL-MCNC: 7.4 G/DL (ref 6.1–8.1)
SODIUM SERPL-SCNC: 138 MMOL/L (ref 135–146)

## 2025-05-21 PROCEDURE — 3044F HG A1C LEVEL LT 7.0%: CPT | Performed by: INTERNAL MEDICINE

## 2025-05-21 PROCEDURE — G0439 PPPS, SUBSEQ VISIT: HCPCS | Performed by: INTERNAL MEDICINE

## 2025-05-21 PROCEDURE — RXMED WILLOW AMBULATORY MEDICATION CHARGE

## 2025-05-21 PROCEDURE — 3074F SYST BP LT 130 MM HG: CPT | Performed by: INTERNAL MEDICINE

## 2025-05-21 PROCEDURE — 3078F DIAST BP <80 MM HG: CPT | Performed by: INTERNAL MEDICINE

## 2025-05-21 PROCEDURE — 1126F AMNT PAIN NOTED NONE PRSNT: CPT | Performed by: INTERNAL MEDICINE

## 2025-05-21 PROCEDURE — 1170F FXNL STATUS ASSESSED: CPT | Performed by: INTERNAL MEDICINE

## 2025-05-21 PROCEDURE — 1159F MED LIST DOCD IN RCRD: CPT | Performed by: INTERNAL MEDICINE

## 2025-05-21 PROCEDURE — 3008F BODY MASS INDEX DOCD: CPT | Performed by: INTERNAL MEDICINE

## 2025-05-21 PROCEDURE — 4010F ACE/ARB THERAPY RXD/TAKEN: CPT | Performed by: INTERNAL MEDICINE

## 2025-05-21 PROCEDURE — 99214 OFFICE O/P EST MOD 30 MIN: CPT | Performed by: INTERNAL MEDICINE

## 2025-05-21 PROCEDURE — 1160F RVW MEDS BY RX/DR IN RCRD: CPT | Performed by: INTERNAL MEDICINE

## 2025-05-21 PROCEDURE — 99397 PER PM REEVAL EST PAT 65+ YR: CPT | Performed by: INTERNAL MEDICINE

## 2025-05-21 PROCEDURE — 83036 HEMOGLOBIN GLYCOSYLATED A1C: CPT | Performed by: INTERNAL MEDICINE

## 2025-05-21 PROCEDURE — 1123F ACP DISCUSS/DSCN MKR DOCD: CPT | Performed by: INTERNAL MEDICINE

## 2025-05-21 RX ORDER — TIRZEPATIDE 10 MG/.5ML
10 INJECTION, SOLUTION SUBCUTANEOUS WEEKLY
Qty: 2 ML | Refills: 1 | Status: SHIPPED | OUTPATIENT
Start: 2025-05-21

## 2025-05-21 RX ORDER — FLUCONAZOLE 100 MG/1
100 TABLET ORAL DAILY
Qty: 3 TABLET | Refills: 1 | Status: SHIPPED | OUTPATIENT
Start: 2025-05-21 | End: 2025-05-24

## 2025-05-21 RX ORDER — METOPROLOL SUCCINATE 100 MG/1
100 TABLET, EXTENDED RELEASE ORAL DAILY
Qty: 100 TABLET | Refills: 3 | Status: SHIPPED | OUTPATIENT
Start: 2025-05-21 | End: 2025-05-21 | Stop reason: DRUGHIGH

## 2025-05-21 RX ORDER — SYRINGE AND NEEDLE,INSULIN,1ML 28GX1/2"
1 SYRINGE, EMPTY DISPOSABLE MISCELLANEOUS DAILY
Qty: 100 EACH | Refills: 3 | Status: SHIPPED | OUTPATIENT
Start: 2025-05-21

## 2025-05-21 RX ORDER — METOPROLOL SUCCINATE 50 MG/1
50 TABLET, EXTENDED RELEASE ORAL DAILY
Qty: 90 TABLET | Refills: 3 | Status: SHIPPED | OUTPATIENT
Start: 2025-05-21

## 2025-05-21 RX ORDER — ATORVASTATIN CALCIUM 20 MG/1
20 TABLET, FILM COATED ORAL NIGHTLY
Qty: 90 TABLET | Refills: 3 | Status: SHIPPED | OUTPATIENT
Start: 2025-05-21

## 2025-05-21 SDOH — HEALTH STABILITY: PHYSICAL HEALTH: ON AVERAGE, HOW MANY MINUTES DO YOU ENGAGE IN EXERCISE AT THIS LEVEL?: 20 MIN

## 2025-05-21 SDOH — ECONOMIC STABILITY: FOOD INSECURITY: WITHIN THE PAST 12 MONTHS, YOU WORRIED THAT YOUR FOOD WOULD RUN OUT BEFORE YOU GOT MONEY TO BUY MORE.: NEVER TRUE

## 2025-05-21 SDOH — ECONOMIC STABILITY: FOOD INSECURITY: WITHIN THE PAST 12 MONTHS, THE FOOD YOU BOUGHT JUST DIDN'T LAST AND YOU DIDN'T HAVE MONEY TO GET MORE.: NEVER TRUE

## 2025-05-21 SDOH — HEALTH STABILITY: PHYSICAL HEALTH: ON AVERAGE, HOW MANY DAYS PER WEEK DO YOU ENGAGE IN MODERATE TO STRENUOUS EXERCISE (LIKE A BRISK WALK)?: 3 DAYS

## 2025-05-21 SDOH — ECONOMIC STABILITY: INCOME INSECURITY: IN THE LAST 12 MONTHS, WAS THERE A TIME WHEN YOU WERE NOT ABLE TO PAY THE MORTGAGE OR RENT ON TIME?: NO

## 2025-05-21 ASSESSMENT — ACTIVITIES OF DAILY LIVING (ADL)
TOILETING: INDEPENDENT
EATING: INDEPENDENT
FEEDING YOURSELF: INDEPENDENT
TAKING MEDICATION: INDEPENDENT
DOING HOUSEWORK: INDEPENDENT
HEARING - LEFT EAR: FUNCTIONAL
STIL DRIVING: YES
DRESSING YOURSELF: INDEPENDENT
WALKS IN HOME: INDEPENDENT
PATIENT'S MEMORY ADEQUATE TO SAFELY COMPLETE DAILY ACTIVITIES?: YES
PREPARING MEALS: INDEPENDENT
MANAGING FINANCES: INDEPENDENT
ADEQUATE_TO_COMPLETE_ADL: YES
HEARING - RIGHT EAR: FUNCTIONAL
JUDGMENT_ADEQUATE_SAFELY_COMPLETE_DAILY_ACTIVITIES: YES
NEEDS ASSISTANCE WITH FOOD: INDEPENDENT
GROCERY SHOPPING: INDEPENDENT
PILL BOX USED: NO
GROOMING: INDEPENDENT
USING TELEPHONE: INDEPENDENT
USING TRANSPORTATION: INDEPENDENT
BATHING: INDEPENDENT

## 2025-05-21 ASSESSMENT — ANXIETY QUESTIONNAIRES
4. TROUBLE RELAXING: NEARLY EVERY DAY
6. BECOMING EASILY ANNOYED OR IRRITABLE: NOT AT ALL
1. FEELING NERVOUS, ANXIOUS, OR ON EDGE: NEARLY EVERY DAY
2. NOT BEING ABLE TO STOP OR CONTROL WORRYING: NEARLY EVERY DAY
5. BEING SO RESTLESS THAT IT IS HARD TO SIT STILL: NEARLY EVERY DAY
GAD7 TOTAL SCORE: 15
7. FEELING AFRAID AS IF SOMETHING AWFUL MIGHT HAPPEN: NOT AT ALL
IF YOU CHECKED OFF ANY PROBLEMS ON THIS QUESTIONNAIRE, HOW DIFFICULT HAVE THESE PROBLEMS MADE IT FOR YOU TO DO YOUR WORK, TAKE CARE OF THINGS AT HOME, OR GET ALONG WITH OTHER PEOPLE: VERY DIFFICULT
3. WORRYING TOO MUCH ABOUT DIFFERENT THINGS: NEARLY EVERY DAY

## 2025-05-21 ASSESSMENT — PAIN SCALES - GENERAL: PAINLEVEL_OUTOF10: 0-NO PAIN

## 2025-05-21 ASSESSMENT — SOCIAL DETERMINANTS OF HEALTH (SDOH)
WITHIN THE LAST YEAR, HAVE YOU BEEN AFRAID OF YOUR PARTNER OR EX-PARTNER?: NO
HOW OFTEN DO YOU ATTEND CHURCH OR RELIGIOUS SERVICES?: NEVER
DO YOU BELONG TO ANY CLUBS OR ORGANIZATIONS SUCH AS CHURCH GROUPS UNIONS, FRATERNAL OR ATHLETIC GROUPS, OR SCHOOL GROUPS?: NO
HOW OFTEN DO YOU GET TOGETHER WITH FRIENDS OR RELATIVES?: TWICE A WEEK
WITHIN THE LAST YEAR, HAVE TO BEEN RAPED OR FORCED TO HAVE ANY KIND OF SEXUAL ACTIVITY BY YOUR PARTNER OR EX-PARTNER?: NO
WITHIN THE LAST YEAR, HAVE YOU BEEN HUMILIATED OR EMOTIONALLY ABUSED IN OTHER WAYS BY YOUR PARTNER OR EX-PARTNER?: NO
WITHIN THE LAST YEAR, HAVE YOU BEEN KICKED, HIT, SLAPPED, OR OTHERWISE PHYSICALLY HURT BY YOUR PARTNER OR EX-PARTNER?: NO
IN A TYPICAL WEEK, HOW MANY TIMES DO YOU TALK ON THE PHONE WITH FAMILY, FRIENDS, OR NEIGHBORS?: TWICE A WEEK
IN THE PAST 12 MONTHS, HAS THE ELECTRIC, GAS, OIL, OR WATER COMPANY THREATENED TO SHUT OFF SERVICE IN YOUR HOME?: NO
HOW OFTEN DO YOU ATTENT MEETINGS OF THE CLUB OR ORGANIZATION YOU BELONG TO?: NEVER
HOW HARD IS IT FOR YOU TO PAY FOR THE VERY BASICS LIKE FOOD, HOUSING, MEDICAL CARE, AND HEATING?: NOT HARD AT ALL

## 2025-05-21 ASSESSMENT — PATIENT HEALTH QUESTIONNAIRE - PHQ9
5. POOR APPETITE OR OVEREATING: NEARLY EVERY DAY
SUM OF ALL RESPONSES TO PHQ QUESTIONS 1-9: 11
2. FEELING DOWN, DEPRESSED OR HOPELESS: NEARLY EVERY DAY
3. TROUBLE FALLING OR STAYING ASLEEP OR SLEEPING TOO MUCH: SEVERAL DAYS
9. THOUGHTS THAT YOU WOULD BE BETTER OFF DEAD, OR OF HURTING YOURSELF: NOT AT ALL
SUM OF ALL RESPONSES TO PHQ9 QUESTIONS 1 AND 2: 5
10. IF YOU CHECKED OFF ANY PROBLEMS, HOW DIFFICULT HAVE THESE PROBLEMS MADE IT FOR YOU TO DO YOUR WORK, TAKE CARE OF THINGS AT HOME, OR GET ALONG WITH OTHER PEOPLE: NOT DIFFICULT AT ALL
1. LITTLE INTEREST OR PLEASURE IN DOING THINGS: MORE THAN HALF THE DAYS
8. MOVING OR SPEAKING SO SLOWLY THAT OTHER PEOPLE COULD HAVE NOTICED. OR THE OPPOSITE, BEING SO FIGETY OR RESTLESS THAT YOU HAVE BEEN MOVING AROUND A LOT MORE THAN USUAL: NOT AT ALL
6. FEELING BAD ABOUT YOURSELF - OR THAT YOU ARE A FAILURE OR HAVE LET YOURSELF OR YOUR FAMILY DOWN: MORE THAN HALF THE DAYS
4. FEELING TIRED OR HAVING LITTLE ENERGY: NOT AT ALL
7. TROUBLE CONCENTRATING ON THINGS, SUCH AS READING THE NEWSPAPER OR WATCHING TELEVISION: NOT AT ALL

## 2025-05-21 ASSESSMENT — LIFESTYLE VARIABLES
HOW OFTEN DO YOU HAVE A DRINK CONTAINING ALCOHOL: MONTHLY OR LESS
HOW OFTEN DO YOU HAVE SIX OR MORE DRINKS ON ONE OCCASION: NEVER
SKIP TO QUESTIONS 9-10: 1
HOW MANY STANDARD DRINKS CONTAINING ALCOHOL DO YOU HAVE ON A TYPICAL DAY: PATIENT DOES NOT DRINK
AUDIT-C TOTAL SCORE: 1

## 2025-05-21 ASSESSMENT — COLUMBIA-SUICIDE SEVERITY RATING SCALE - C-SSRS
1. IN THE PAST MONTH, HAVE YOU WISHED YOU WERE DEAD OR WISHED YOU COULD GO TO SLEEP AND NOT WAKE UP?: NO
6. HAVE YOU EVER DONE ANYTHING, STARTED TO DO ANYTHING, OR PREPARED TO DO ANYTHING TO END YOUR LIFE?: NO
2. HAVE YOU ACTUALLY HAD ANY THOUGHTS OF KILLING YOURSELF?: NO

## 2025-05-21 NOTE — PATIENT INSTRUCTIONS
Do not take the 12,5 mg Mounjaro this Friday. We reduced your dose to 10 mg weekly starting either Friday or as soon as it arrives from .    If you start having more low sugars , then decrease insulin by 4 units daily, or call if you are not sure what to do.    Metoprolol succinate/Toprol:: Sent 50 mg tabs and take one per day. This replaces the 1/2 tab of 100 mg daily.    Increase metamucil to 5 caps daily can split the dose.    Follow up for weight check and pelvic exam (routine) in 4-6 weeks.    Shingrix 2nd dose due.  We note that you did not have one in 2021, but had fist dose in October 2024.    RSV vaccine recommended this year , can be August (or sooner if RSV flares in the area)  Flu and covid boosters fall 2025.    Yeast: fluconazole: one tab by mouth daily for 3 days. If symptoms persist beyond a week or if they re occur in the next 2 weeks, can refill it and take one per day for 3 days again. Also finish the current vaginal monistat you are using.    Watch bp at home, checl at least weekly, if top number is 100 or lower call about adjusting medication , possibly holding hydrochlorothiazide.

## 2025-05-22 ENCOUNTER — PHARMACY VISIT (OUTPATIENT)
Dept: PHARMACY | Facility: CLINIC | Age: 70
End: 2025-05-22
Payer: MEDICARE

## 2025-05-22 LAB
BASOPHILS # BLD AUTO: 43 CELLS/UL (ref 0–200)
BASOPHILS NFR BLD AUTO: 0.5 %
EOSINOPHIL # BLD AUTO: 26 CELLS/UL (ref 15–500)
EOSINOPHIL NFR BLD AUTO: 0.3 %
ERYTHROCYTE [DISTWIDTH] IN BLOOD BY AUTOMATED COUNT: 13.1 % (ref 11–15)
HCT VFR BLD AUTO: 45.9 % (ref 35–45)
HGB BLD-MCNC: 15.4 G/DL (ref 11.7–15.5)
LYMPHOCYTES # BLD AUTO: 1479 CELLS/UL (ref 850–3900)
LYMPHOCYTES NFR BLD AUTO: 17.2 %
MCH RBC QN AUTO: 32 PG (ref 27–33)
MCHC RBC AUTO-ENTMCNC: 33.6 G/DL (ref 32–36)
MCV RBC AUTO: 95.2 FL (ref 80–100)
MONOCYTES # BLD AUTO: 654 CELLS/UL (ref 200–950)
MONOCYTES NFR BLD AUTO: 7.6 %
NEUTROPHILS # BLD AUTO: 6398 CELLS/UL (ref 1500–7800)
NEUTROPHILS NFR BLD AUTO: 74.4 %
PLATELET # BLD AUTO: 308 THOUSAND/UL (ref 140–400)
PMV BLD REES-ECKER: 11.9 FL (ref 7.5–12.5)
RBC # BLD AUTO: 4.82 MILLION/UL (ref 3.8–5.1)
T3FREE SERPL-MCNC: 3.6 PG/ML (ref 2.3–4.2)
T4 FREE SERPL-MCNC: 1.5 NG/DL (ref 0.8–1.8)
WBC # BLD AUTO: 8.6 THOUSAND/UL (ref 3.8–10.8)

## 2025-05-23 DIAGNOSIS — Z12.31 VISIT FOR SCREENING MAMMOGRAM: Primary | ICD-10-CM

## 2025-05-23 NOTE — RESULT ENCOUNTER NOTE
Please call the patient regarding her result. Her blood tests ok, nothing metabolic to cause her weight loss . Continue plan and follow up with dr avelar as she has planned.

## 2025-05-28 ENCOUNTER — APPOINTMENT (OUTPATIENT)
Dept: PRIMARY CARE | Facility: CLINIC | Age: 70
End: 2025-05-28
Payer: MEDICARE

## 2025-05-28 ASSESSMENT — PATIENT HEALTH QUESTIONNAIRE - PHQ9
3. TROUBLE FALLING OR STAYING ASLEEP: SEVERAL DAYS
SUM OF ALL RESPONSES TO PHQ9 QUESTIONS 1 & 2: 4
5. POOR APPETITE OR OVEREATING: MORE THAN HALF THE DAYS
7. TROUBLE CONCENTRATING ON THINGS, SUCH AS READING THE NEWSPAPER OR WATCHING TELEVISION: NOT AT ALL
2. FEELING DOWN, DEPRESSED OR HOPELESS: MORE THAN HALF THE DAYS
6. FEELING BAD ABOUT YOURSELF - OR THAT YOU ARE A FAILURE OR HAVE LET YOURSELF OR YOUR FAMILY DOWN: MORE THAN HALF THE DAYS
8. MOVING OR SPEAKING SO SLOWLY THAT OTHER PEOPLE COULD HAVE NOTICED. OR THE OPPOSITE, BEING SO FIGETY OR RESTLESS THAT YOU HAVE BEEN MOVING AROUND A LOT MORE THAN USUAL: NOT AT ALL
9. THOUGHTS THAT YOU WOULD BE BETTER OFF DEAD, OR OF HURTING YOURSELF: NOT AT ALL
SUM OF ALL RESPONSES TO PHQ QUESTIONS 1-9: 9
4. FEELING TIRED OR HAVING LITTLE ENERGY: NOT AT ALL
1. LITTLE INTEREST OR PLEASURE IN DOING THINGS: MORE THAN HALF THE DAYS

## 2025-05-28 ASSESSMENT — ANXIETY QUESTIONNAIRES
3. WORRYING TOO MUCH ABOUT DIFFERENT THINGS: MORE THAN HALF THE DAYS
IF YOU CHECKED OFF ANY PROBLEMS ON THIS QUESTIONNAIRE, HOW DIFFICULT HAVE THESE PROBLEMS MADE IT FOR YOU TO DO YOUR WORK, TAKE CARE OF THINGS AT HOME, OR GET ALONG WITH OTHER PEOPLE: SOMEWHAT DIFFICULT
6. BECOMING EASILY ANNOYED OR IRRITABLE: NOT AT ALL
7. FEELING AFRAID AS IF SOMETHING AWFUL MIGHT HAPPEN: NOT AT ALL
1. FEELING NERVOUS, ANXIOUS, OR ON EDGE: NEARLY EVERY DAY
5. BEING SO RESTLESS THAT IT IS HARD TO SIT STILL: MORE THAN HALF THE DAYS
4. TROUBLE RELAXING: NEARLY EVERY DAY
GAD7 TOTAL SCORE: 12
2. NOT BEING ABLE TO STOP OR CONTROL WORRYING: MORE THAN HALF THE DAYS

## 2025-05-28 NOTE — PROGRESS NOTES
Collaborative Care ([facility]) Initial Assessment    Session Time  Start: 9:57 am   End: 10:57 am      Collaborative Care program information (including case discussion with psychiatry, involvement of [facility] and billing when applicable) was provided and discussed with the patient. Patient Indicated understanding and agreed to proceed.   Confirm: Yes    Patient Health Questionnaire-9 Score: 9 (5/28/2025 10:00 AM)  JORGE A-7 Total Score: 12 (5/28/2025 10:00 AM)    Reason for Visit / Chief Complaint  Chief Complaint   Patient presents with    Depression    Anxiety   Patient is a 70 year old  female who was referred to collaborative care by Dr. Nat Wasserman for symptoms of depression and anxiety. Patient reported that she sees a psychiatrist named Dr. Andrei Phelps who prescribes her medications. She stated that she worries about her grandson who was born earlier and had to stay in the hospital for 7 months. She stated that she worries about him. She reported that she has trouble staying asleep at times. She stated that she will walk up at 5:00 am with anxiety and not be able to go back to sleep. She stated she has trouble with decreased appetite and she has lost 15 pounds since February. She stated that she had trouble with depression and anxiety for the past 41 years but it has came and went over the yeas She stated that it has returned in April with an incident with her grandson. Patient is open to counseling for coping skills for depression and anxiety.     Accompanied by: Self  Guardian Status: Self  Caregiver Status: Does not have a caregiver    Review of Symptoms    Sleep   Average Hours Sleep in/Night: 6  Prepares Self for Sleep at Time: 11:00 pm   Usual Wake up Time: 6:30 weekdays 9:00 am weekends. Wakes up at 5:00 am sometimes   Sleep Symptoms: interrupted sleep, waking up anxious, and shaky   Sleep Hygiene: hot shower 1-2H/before bed, uses electronics/phone, and consistent sleep/wake time    Mood   Symptom  Onset/Duration: First started 41 years ago with post partum depression. Has came and gone over the years. Returned in April- trip to ED that was troubling for her. Money stressors   Current Sx: little interest/pleasure doing things, feeling down, feeling depressed, trouble staying asleep, feeling misunderstood, feeling like failure, and trouble concentrating  Triggers: situation(s), people, and event(s)  Past Sx: little interest/pleasure doing things, feeling down, feeling depressed, trouble staying asleep, poor appetite, weight loss, feeling like failure, and feeling let others down    Anxiety   Symptom Onset/Duration: Has anxiety in the past with her depression. Returned in April  Current Sx: feeling nervous/anxious/on edge, difficulty stopping/controlling worry, worrying too much, trouble relaxing, feeling fidgety/restless, and trouble concentrating  Panic / Somatic Sx: none  Triggers: situation(s), people, and event(s)  Past Sx: feeling nervous/anxious/on edge, difficulty stopping/controlling worry, worrying too much, trouble relaxing, feeling fidgety/restless, and trouble concentrating    Self-Esteem / Self-Image   Self Esteem Rating (1-10 Scale, 10 being high): 7  Self-Esteem / Self Image Sx: able to identify strength, positive attitude towards self, good perception of self, feels has good qualities, feels worthy, and feels proud of self    Appetite   Description of Overall Appetite: poor appetite and decreased appetite  Eating Behaviors: skips meals  Concerns with appetite: none, denied    Anger / Irritability  Symptoms of Anger / Irritability: none, denied     Communication / Self Expression  Communication Style & Concerns: able to express self/emotions    Trauma    Symptoms Onset/Duration: symptoms more than one month  Traumatic Experiences: Grandson was born at 27 weeks and was in NICU for 7 months came home on vent. Patient cared from him.   Current Symptoms Related to Traumatic Experience: worries about  her grandson  Triggers: situation(s) and people    Grief / Loss / Adjustment   Symptom Onset/Duration: NA  Current Sx: NA  Factors of Grief / Loss / Adjustment: NA    Hallucinations / Delusions   Hallucinations & Delusions Experienced: none, denied    Learning Concerns / Memory   Learning Concerns & Sx: trouble with focus and concentrating  Memory Concerns & Sx: none, denied    Functional impairment   Impacting ADL's: no impairment   Impacting IADL's: No impairment  Impacting Ability : No impairment    Associated Medical Concerns   Potential Associated Factors: Vitamin D deficiency, Vitamin B12 deficiency, diabetes, and heart murmur, axial myopia, and cataract      Comprehensive Behavioral Health History     Medications  Current Mental Health Medications:   Buspar / buspirone; Dose: 10 mg ; Side effects: None, denied  Cymbalta / duloxetine; Dose: 60 mg ; Side effects: None, denied  Remeron / mirtazapine; Dose: 30 mg ; Side effects: None, denied    Past Mental Health Medications:   None/Unknown    Concerns / challenges / barriers with taking medications? No concerns    Open to medication recommendations from consulting psychiatrist? No    Do you ever forget to take your medication? No  If yes, how often? NA    Mental Health Treatment History  Mental Health Treatment: individual therapy  Reason/When/Where/Outcome: depression/ anxiety- 3 years ago- [facility]- Sandeep Freddy-     Risk History  Suicidal Thoughts/Method/Intent/Plan: None, denied  Suicide Attempts/Preparations: None, denied  Number of Suicide Attempts: 0  Access to Firearms/Lethal Means: stored in locked cabinet and gun in home  Non-Suicidal Self Injury: None, denied  Last [place] Risk Score: No Risk    Protective Factors: good protective factors, hopefulness/future orientation, social support/connectedness, community support, positive family relationships, and marriage/partnership    Violence: None, denied  Homicidal Thoughts/Method/Plan/Intent: None,  denied  Homicidal Attempts/Preparations: None, denied  Number of Attempts: 0      Substance Use History    Substances    Social History     Substance and Sexual Activity   Alcohol Use Yes    Comment: Have a drink occasionally     Social History     Substance and Sexual Activity   Drug Use Never       Substance Current Use   Alcohol Occasional use                   Addiction Treatment   NA    Family History    Mental Health / Conditions    Family Member Condition / Diagnosis Medications / Side Effects   Cousin;  both  Depression None/Unknown   Aunt; paternal Psychotic disorder None/Unknown               Substance Use    Family Member Substance Current Use   Cousin;  maternal Alcohol Excessive use                      History of Suicide    Family Member Details   N/A NA           Social History    Housing   Living Situation: lives with spouse and lives in house  Safe Housing Conditions / Feels Safe in Home: Yes    Employment  Current Employment: REtired  Current Concerns/Challenges: No    Income   Current Concerns/Challenges: No  Receive Benefits/Assistance: No    Education   Status / Level of Education: Master's degree    Legal   Legal Considerations: None, denied    Relationships   S/O:     Parents/Guardian:   Siblings: 1 sister   Friends: yes   Other: 2 sons       Active Duty? No  Are you a ? No    Sexuality / Gender   Concerns with Sexuality/Gender: None, denied  Sexual Orientation: heterosexual    Preferred Gender Pronouns / Identity: She/her/hers    Transportation   Transportation Concerns: active 's license and has vehicle    Scientologist/ Spirituality   Are you Zoroastrian or Spiritual: Yes  Scientologist / Practice: Non-Confucianism  Spiritual Practice: seeks meaning/purpose in life, empathy and intuition focused, sense of wholeness, connected to inner/outer self, and practices gratitude    Coping / Strengths / Supports   Coping:  affirmations, reframing, and talking with  someone  Strengths: dependable, intelligent, and loving  Supports: Spouse      Abuse History  Physical Abuse: No  Sexual Abuse: No  Verbal / Emotional Abuse / Bullying (+Cyber): No   Financial Abuse: No  Domestic Violence: No    Assessment Summary  / Plan    Assessment Summary:  What do you want to work on/get out of collaborative care? To feel better    Plan:   bi-weekly    Follow up in about 1 week (around 6/4/2025).    Provisional Findings / Impressions  Primary: F33.2  Moderately severe recurrent major depression (Multi)     Secondary: F41.9 Anxiety     Goals    Care Plan    There is no care plan documentation to display.

## 2025-05-29 ENCOUNTER — DOCUMENTATION (OUTPATIENT)
Dept: PRIMARY CARE | Facility: CLINIC | Age: 70
End: 2025-05-29
Payer: MEDICARE

## 2025-05-29 DIAGNOSIS — F41.9 ANXIETY: Primary | ICD-10-CM

## 2025-05-29 DIAGNOSIS — F33.2 MODERATELY SEVERE RECURRENT MAJOR DEPRESSION (MULTI): ICD-10-CM

## 2025-06-02 ENCOUNTER — APPOINTMENT (OUTPATIENT)
Dept: BEHAVIORAL HEALTH | Facility: CLINIC | Age: 70
End: 2025-06-02
Payer: MEDICARE

## 2025-06-02 DIAGNOSIS — F42.8 OBSESSIVE THINKING: ICD-10-CM

## 2025-06-02 DIAGNOSIS — F41.9 ANXIETY: ICD-10-CM

## 2025-06-02 DIAGNOSIS — F41.8 MIXED ANXIETY AND DEPRESSIVE DISORDER: ICD-10-CM

## 2025-06-02 PROCEDURE — 3044F HG A1C LEVEL LT 7.0%: CPT | Performed by: PSYCHIATRY & NEUROLOGY

## 2025-06-02 PROCEDURE — 99215 OFFICE O/P EST HI 40 MIN: CPT | Performed by: PSYCHIATRY & NEUROLOGY

## 2025-06-02 PROCEDURE — 4010F ACE/ARB THERAPY RXD/TAKEN: CPT | Performed by: PSYCHIATRY & NEUROLOGY

## 2025-06-02 RX ORDER — BUSPIRONE HYDROCHLORIDE 7.5 MG/1
7.5 TABLET ORAL 3 TIMES DAILY
Qty: 90 TABLET | Refills: 1 | Status: SHIPPED | OUTPATIENT
Start: 2025-06-02 | End: 2025-08-01

## 2025-06-02 ASSESSMENT — ENCOUNTER SYMPTOMS
NERVOUS/ANXIOUS: 1
DEPRESSED MOOD: 1
DYSPHORIC MOOD: 1

## 2025-06-02 NOTE — ASSESSMENT & PLAN NOTE
Diagnosis: Recurrent anxiety and depression, obsessive compulsive features.     Treatment plan:   The FDA risks, benefits & alternatives to the medications prescribed were explained to you today. You were able to understand & repeat these risks, benefits & alternatives to these prescribed medications. You have agreed to proceed with treatment with the medications discussed based on the conclusion that the benefit outweighs the risks of this treatment regimen: continue duloxetine 60 mg twice daily and mirtazapine 30 mg nightly. We are increasing buspirone to 7.5 mg three times a day from current 5 mg twice daily.     Follow up in 2 to 3 weeks.  Orders:    busPIRone (Buspar) 7.5 mg tablet; Take 1 tablet (7.5 mg) by mouth 3 times a day. Take with breakfast and lunch daily.

## 2025-06-02 NOTE — ASSESSMENT & PLAN NOTE
Diagnosis: Recurrent anxiety and depression, obsessive compulsive features.     Treatment plan:   The FDA risks, benefits & alternatives to the medications prescribed were explained to you today. You were able to understand & repeat these risks, benefits & alternatives to these prescribed medications. You have agreed to proceed with treatment with the medications discussed based on the conclusion that the benefit outweighs the risks of this treatment regimen: continue duloxetine 60 mg twice daily and mirtazapine 30 mg nightly. We are increasing buspirone to 7.5 mg three times a day from current 5 mg twice daily.     Follow up in 2 to 3 weeks.

## 2025-06-02 NOTE — PROGRESS NOTES
"Subjective   Patient ID: Alexia Cho \"Corina" is a 70 y.o. female who presents for No chief complaint on file. I am not doing well.    Virtual Consent: The patient engaged in a telehealth session via Epic audio visual or phone with this provider practicing within the [facility]. The identity of the patient was verified by their date of birth and last four digits of their social security number. The provider demonstrated that confidentially was preserved at their location. The patient was informed that they were responsible for ensuring confidentially was secured at their location. The patient's location was documented for emergency purposes. The patient was informed of the necessary steps that would occur if an emergency was to occur or technology failed during session.   An interactive audio and video telecommunication system which permits real time communications between the patient (at the patient's home) and provider (at the home office) was utilized to provide this telehealth service.   Verbal consent was requested and obtained from Alexia Cho on this date, 06/2/25 for a telehealth visit and the patient's location was confirmed at the time of the visit.     HPI: The patient has had a recurrence of depression and anxiety related to increase credit card debt a recent scare about her health which turned out okay. She started to see her therapist and has her second appointment coming up this week. She can't help her son with their grandson economically for now and that has been a stressor as well.      Past Medical History:  08/10/2015: Abnormal finding on breast imaging  03/11/2013: Abnormal levels of other serum enzymes      Comment:  Abnormal liver enzymes  09/23/2019: Abnormal weight gain      Comment:  Abnormal weight gain  02/24/2025: Abnormal weight loss  02/01/2017: Acute candidiasis of vulva and vagina      Comment:  Yeast vaginitis  03/17/2016: Acute pain of right knee  05/01/2019: Acute " stress reaction      Comment:  Acute reaction to stress  12/07/2017: Acute upper respiratory infection, unspecified      Comment:  Upper respiratory infection with cough and congestion  12/08/2019: Adverse effect of insulin and oral hypoglycemic   (antidiabetic) drugs, initial encounter      Comment:  Metformin adverse reaction  02/14/2019: Allergy status to unspecified drugs, medicaments and   biological substances      Comment:  History of adverse drug reaction  No date: Anxiety  No date: Arthritis  05/09/2023: Arthritis of knee, right  No date: Benign and innocent cardiac murmurs      Comment:  Functional murmur  04/27/2016: Bilateral primary osteoarthritis of knee      Comment:  Primary osteoarthritis of both knees  02/03/2020: Body mass index (BMI) 29.0-29.9, adult      Comment:  BMI 29.0-29.9,adult  03/18/2020: Body mass index (BMI) 29.0-29.9, adult      Comment:  BMI 29.0-29.9,adult  03/17/2016: Decreased range of motion (ROM) of knee  06/13/2017: Dependent relative needing care at home      Comment:  Caregiver stress  10/21/2015: Diaphragmatic hernia without obstruction or gangrene      Comment:  Hiatal hernia  06/19/2014: Effusion, unspecified knee      Comment:  Effusion of knee  02/03/2020: Encounter for general adult medical examination without   abnormal findings      Comment:  Welcome to Medicare preventive visit  10/20/2016: Encounter for immunization      Comment:  Need for prophylactic vaccination and inoculation                against influenza  No date: Encounter for immunization      Comment:  Need for tuberculosis vaccination  01/31/2016: Encounter for other preprocedural examination      Comment:  Pre-op evaluation  05/09/2023: Eyelid retraction right lower eyelid  05/09/2023: Hematuria  No date: Hypertension  06/24/2019: Impacted cerumen, left ear      Comment:  Impacted cerumen of left ear  05/09/2023: Laxity of eyelid  04/21/2015: Long term (current) use of antibiotics      Comment:  Need  for prophylactic antibiotic  07/11/2021: Long term (current) use of insulin (Multi)      Comment:  Insulin dose changed  04/11/2021: Major depressive disorder, 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   psychotic features (Multi)      Comment:  Moderately severe recurrent major depression  07/14/2020: Major depressive disorder, 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   psychotic features (Multi)      Comment:  Moderately severe recurrent major depression  02/03/2020: Major depressive disorder, recurrent, moderate      Comment:  Moderate episode of recurrent major depressive disorder  05/09/2023: Metformin adverse reaction  07/06/2021: Obstructive sleep apnea (adult) (pediatric)      Comment:  Obstructive sleep apnea  01/23/2015: Osteoarthritis of right knee  09/18/2017: Other abnormal and inconclusive findings on diagnostic   imaging of breast      Comment:  Mammogram abnormal  06/13/2017: Other conditions influencing health status      Comment:  DM (diabetes mellitus), secondary uncontrolled  01/02/2022: Other long term (current) drug therapy      Comment:  New medication added  03/07/2022: Other nonrheumatic aortic valve disorders      Comment:  Aortic valve sclerosis  03/11/2013: Other specified abnormal immunological findings in serum      Comment:  KARI positive  07/22/2020: Other specified counseling      Comment:  Encounter for diabetes education  06/25/2020: Other specified counseling      Comment:  Glucometer instruction, encounter for  01/30/2015: Other specified disorders of nose and nasal sinuses      Comment:  Staphylococcus infection of nose  07/21/2016: Other specified symptoms and signs involving the   circulatory and respiratory systems      Comment:  Decreased pedal pulses  No date: Overweight      Comment:  Overweight  No date: Pain in unspecified knee      Comment:  Joint pain, knee  02/21/2014: Pain in unspecified knee      Comment:  Joint pain, knee  No date: Papillomavirus as the cause of diseases classified elsewhere       Comment:  Human papilloma virus infection  No date: Personal history of diseases of the blood and blood-forming   organs and certain disorders involving the immune mechanism      Comment:  History of anemia  No date: Personal history of diseases of the skin and subcutaneous   tissue      Comment:  History of urticaria  No date: Personal history of other diseases of the circulatory system      Comment:  History of hypertension  03/15/2018: Personal history of other diseases of the respiratory   system      Comment:  History of acute sinusitis  No date: Personal history of other drug therapy      Comment:  History of influenza vaccination  09/18/2017: Personal history of other drug therapy      Comment:  History of influenza vaccination  10/02/2018: Personal history of other endocrine, nutritional and   metabolic disease      Comment:  History of hypokalemia  04/10/2019: Personal history of other mental and behavioral disorders      Comment:  History of depression  01/19/2018: Personal history of other specified conditions      Comment:  History of nausea  09/23/2019: Personal history of other specified conditions      Comment:  History of abnormal weight loss  07/22/2015: Personal history of other specified conditions      Comment:  History of diarrhea  03/17/2017: Personal history of other specified conditions      Comment:  History of insomnia  No date: Personal history of pneumonia (recurrent)      Comment:  History of pneumonia  No date: Personal history of urinary (tract) infections      Comment:  History of bladder infections  No date: Personal history of urinary (tract) infections      Comment:  History of urinary tract infection  No date: Postpartum depression      Comment:  Post partum depression  06/05/2019: Problem related to primary support group, unspecified      Comment:  Relationship problem between caregiver and child  05/09/2023: Pulmonary hypertension (Multi)  06/24/2013: Rash and other nonspecific  skin eruption      Comment:  Rash  03/17/2016: Right leg weakness  12/21/2020: Type 2 diabetes mellitus with hyperglycemia (Multi)      Comment:  Type 2 diabetes mellitus with hyperglycemia, without                long-term current use of insulin  01/23/2015: Type 2 diabetes mellitus with hyperglycemia, with long-  term current use of insulin  01/27/2016: Unilateral primary osteoarthritis, right knee      Comment:  Arthritis of knee, right  12/08/2019: Unspecified exophthalmos      Comment:  Ocular proptosis  05/09/2023: Urethral caruncle  No date: Urinary tract infection  07/11/2021: Urinary tract infection, site not specified      Comment:  Acute lower urinary tract infection  No date: Varicella  10/05/2022: Vitamin D deficiency, unspecified      Comment:  Vitamin D deficiency     Review of patient's family history indicates:  Problem: Hypertension      Relation: Mother          Name: Lindsay              Age of Onset: (Not Specified)  Problem: Transient ischemic attack      Relation: Mother          Name: Lindsay              Age of Onset: (Not Specified)  Problem: Diverticulitis      Relation: Mother          Name: Lindsay              Age of Onset: (Not Specified)  Problem: Diverticulosis      Relation: Mother          Name: Lindsay              Age of Onset: (Not Specified)  Problem: Other (bladder cancer)      Relation: Mother          Name: Lindsay              Age of Onset: (Not Specified)  Problem: Hearing loss      Relation: Mother          Name: Lindsay              Age of Onset: (Not Specified)  Problem: Hypertension      Relation: Father          Name: Fariha              Age of Onset: (Not Specified)  Problem: Diabetes      Relation: Father          Name: Fariha              Age of Onset: (Not Specified)  Problem: Diabetes      Relation: Son          Name: Kurtis              Age of Onset: (Not Specified)  Problem: Hearing loss      Relation: Son          Name: Kurtis              Age of Onset: (Not  Specified)     MSE: The patient is alert, fully oriented, language is intact, and recent and remote memory intact. The patient denies any suicidal or homicidal ideation or plans. The patient presents with anxious and depressive features without manic or psychotic symptoms. Thought is logical and clear. No disturbances of judgment or insight are exhibited. No behavioral disturbances are present on examination.     Mental Status Exam     General: In no acute distress.   Appearance: Calm  Attitude: Cooperative.   Behavior: Anxious and labile features.  Motor Activity: No agitation or retardation. No EPS/TD. Normal gait and station.   Speech: Regular rate, rhythm, volume and tone, spontaneous, fluent.   Mood: Depressive and anxious features.  Affect: Depressive and anxious features.  Thought Process: Rational.  Thought Content: Appropriate  Thought Perception: Does not endorse auditory or visual hallucinations, does not appear to be responding to hallucinatory stimuli.   Cognition: Alert, oriented x3. No deficits noted. Adequate fund of knowledge. No deficit in recent and remote memory. No deficits in attention, concentration or language.    Insight: Insight is limited.   Judgment: Judgment is limited.       Appearance: well-groomed.   Build: Average.   Demeanor: Average.  Eye Contact: Average..   Motor Activity: Average.   Speech: Clear.   Language: Neurologic language is intact.   Fund of Knowledge: aware of current events,~fair fund of knowledge.   Delusions: None Reported.   Self Harm: None Reported.   Aggressive: None Reported.   Mood: Depressive and anxious features.  Affect: Depressive and anxious features.  Orientation: Alert.   Manner: Cooperative.   Thought process: Goal-directed.   Thought association: displays rational thought process.   Content of thought: No suicidal or homicidal ideation or plans are evidenced.   Abstract/ Rational Thought: intact   Memory: Grossly intact.   Behavior: Calm.    Attention/Concentration: normal.   Cognition: Intact.   Intelligence Estimate: Average.   Executive Function: Intact.   Insight: Intact.   Judgement: Intact.         Review of Systems   Neurological:         MSE: The patient is alert, fully oriented, language is intact, and recent and remote memory intact. The patient denies any suicidal or homicidal ideation or plans. The patient presents with anxious and depressive features without manic or psychotic symptoms. Thought is logical and clear. No disturbances of judgment or insight are exhibited. No behavioral disturbances are present on examination.     Mental Status Exam     General: In no acute distress.   Appearance: Calm  Attitude: Cooperative.   Behavior: Anxious and labile features.  Motor Activity: No agitation or retardation. No EPS/TD. Normal gait and station.   Speech: Regular rate, rhythm, volume and tone, spontaneous, fluent.   Mood: Depressive and anxious features.  Affect: Depressive and anxious features.  Thought Process: Rational.  Thought Content: Appropriate  Thought Perception: Does not endorse auditory or visual hallucinations, does not appear to be responding to hallucinatory stimuli.   Cognition: Alert, oriented x3. No deficits noted. Adequate fund of knowledge. No deficit in recent and remote memory. No deficits in attention, concentration or language.    Insight: Insight is limited.   Judgment: Judgment is limited.       Appearance: well-groomed.   Build: Average.   Demeanor: Average.  Eye Contact: Average..   Motor Activity: Average.   Speech: Clear.   Language: Neurologic language is intact.   Fund of Knowledge: aware of current events,~fair fund of knowledge.   Delusions: None Reported.   Self Harm: None Reported.   Aggressive: None Reported.   Mood: Depressive and anxious features.  Affect: Depressive and anxious features.  Orientation: Alert.   Manner: Cooperative.   Thought process: Goal-directed.   Thought association: displays  rational thought process.   Content of thought: No suicidal or homicidal ideation or plans are evidenced.   Abstract/ Rational Thought: intact   Memory: Grossly intact.   Behavior: Calm.   Attention/Concentration: normal.   Cognition: Intact.   Intelligence Estimate: Average.   Executive Function: Intact.   Insight: Intact.   Judgement: Intact.      Psychiatric/Behavioral:  Positive for dysphoric mood. The patient is nervous/anxious.         MSE: The patient is alert, fully oriented, language is intact, and recent and remote memory intact. The patient denies any suicidal or homicidal ideation or plans. The patient presents with anxious and depressive features without manic or psychotic symptoms. Thought is logical and clear. No disturbances of judgment or insight are exhibited. No behavioral disturbances are present on examination.     Mental Status Exam     General: In no acute distress.   Appearance: Calm  Attitude: Cooperative.   Behavior: Anxious and labile features.  Motor Activity: No agitation or retardation. No EPS/TD. Normal gait and station.   Speech: Regular rate, rhythm, volume and tone, spontaneous, fluent.   Mood: Depressive and anxious features.  Affect: Depressive and anxious features.  Thought Process: Rational.  Thought Content: Appropriate  Thought Perception: Does not endorse auditory or visual hallucinations, does not appear to be responding to hallucinatory stimuli.   Cognition: Alert, oriented x3. No deficits noted. Adequate fund of knowledge. No deficit in recent and remote memory. No deficits in attention, concentration or language.    Insight: Insight is limited.   Judgment: Judgment is limited.       Appearance: well-groomed.   Build: Average.   Demeanor: Average.  Eye Contact: Average..   Motor Activity: Average.   Speech: Clear.   Language: Neurologic language is intact.   Fund of Knowledge: aware of current events,~fair fund of knowledge.   Delusions: None Reported.   Self Harm: None  Reported.   Aggressive: None Reported.   Mood: Depressive and anxious features.  Affect: Depressive and anxious features.  Orientation: Alert.   Manner: Cooperative.   Thought process: Goal-directed.   Thought association: displays rational thought process.   Content of thought: No suicidal or homicidal ideation or plans are evidenced.   Abstract/ Rational Thought: intact   Memory: Grossly intact.   Behavior: Calm.   Attention/Concentration: normal.   Cognition: Intact.   Intelligence Estimate: Average.   Executive Function: Intact.   Insight: Intact.   Judgement: Intact.      All other systems reviewed and are negative.    Psych Review of Symptoms:    ADHD: Patient denied any symptoms.         Anxiety:   Generalized Anxiety Symptoms: Difficulty controlling worry and excessive worry.       Developmental and Sensory Concerns: Patient denied any symptoms.         Depressive Symptoms:   Depressed mood and irritable.       Disruptive and Conduct Symptoms: Patient denied any symptoms.         Eating / Feeding Concerns: Patient denied any symptoms.         Elimination Symptoms: Patient denied any symptoms.         Manic Symptoms: Patient denied any symptoms.         Obsessive-Compulsive Symptoms: Patient denied any symptoms.         Psychotic Symptoms: Patient denied any symptoms.           Trauma Related Symptoms: Patient denied any symptoms.           Sleep Concerns: Patient denied any symptoms.             Objective   Physical Exam  Neurological:      Mental Status: She is alert and oriented to person, place, and time.      Comments: MSE: The patient is alert, fully oriented, language is intact, and recent and remote memory intact. The patient denies any suicidal or homicidal ideation or plans. The patient presents with anxious and depressive features without manic or psychotic symptoms. Thought is logical and clear. No disturbances of judgment or insight are exhibited. No behavioral disturbances are present on  examination.     Mental Status Exam     General: In no acute distress.   Appearance: Calm  Attitude: Cooperative.   Behavior: Anxious and labile features.  Motor Activity: No agitation or retardation. No EPS/TD. Normal gait and station.   Speech: Regular rate, rhythm, volume and tone, spontaneous, fluent.   Mood: Depressive and anxious features.  Affect: Depressive and anxious features.  Thought Process: Rational.  Thought Content: Appropriate  Thought Perception: Does not endorse auditory or visual hallucinations, does not appear to be responding to hallucinatory stimuli.   Cognition: Alert, oriented x3. No deficits noted. Adequate fund of knowledge. No deficit in recent and remote memory. No deficits in attention, concentration or language.    Insight: Insight is limited.   Judgment: Judgment is limited.       Appearance: well-groomed.   Build: Average.   Demeanor: Average.  Eye Contact: Average..   Motor Activity: Average.   Speech: Clear.   Language: Neurologic language is intact.   Fund of Knowledge: aware of current events,~fair fund of knowledge.   Delusions: None Reported.   Self Harm: None Reported.   Aggressive: None Reported.   Mood: Depressive and anxious features.  Affect: Depressive and anxious features.  Orientation: Alert.   Manner: Cooperative.   Thought process: Goal-directed.   Thought association: displays rational thought process.   Content of thought: No suicidal or homicidal ideation or plans are evidenced.   Abstract/ Rational Thought: intact   Memory: Grossly intact.   Behavior: Calm.   Attention/Concentration: normal.   Cognition: Intact.   Intelligence Estimate: Average.   Executive Function: Intact.   Insight: Intact.   Judgement: Intact.      Psychiatric:      Comments: MSE: The patient is alert, fully oriented, language is intact, and recent and remote memory intact. The patient denies any suicidal or homicidal ideation or plans. The patient presents with anxious and depressive features  without manic or psychotic symptoms. Thought is logical and clear. No disturbances of judgment or insight are exhibited. No behavioral disturbances are present on examination.     Mental Status Exam     General: In no acute distress.   Appearance: Calm  Attitude: Cooperative.   Behavior: Anxious and labile features.  Motor Activity: No agitation or retardation. No EPS/TD. Normal gait and station.   Speech: Regular rate, rhythm, volume and tone, spontaneous, fluent.   Mood: Depressive and anxious features.  Affect: Depressive and anxious features.  Thought Process: Rational.  Thought Content: Appropriate  Thought Perception: Does not endorse auditory or visual hallucinations, does not appear to be responding to hallucinatory stimuli.   Cognition: Alert, oriented x3. No deficits noted. Adequate fund of knowledge. No deficit in recent and remote memory. No deficits in attention, concentration or language.    Insight: Insight is limited.   Judgment: Judgment is limited.       Appearance: well-groomed.   Build: Average.   Demeanor: Average.  Eye Contact: Average..   Motor Activity: Average.   Speech: Clear.   Language: Neurologic language is intact.   Fund of Knowledge: aware of current events,~fair fund of knowledge.   Delusions: None Reported.   Self Harm: None Reported.   Aggressive: None Reported.   Mood: Depressive and anxious features.  Affect: Depressive and anxious features.  Orientation: Alert.   Manner: Cooperative.   Thought process: Goal-directed.   Thought association: displays rational thought process.   Content of thought: No suicidal or homicidal ideation or plans are evidenced.   Abstract/ Rational Thought: intact   Memory: Grossly intact.   Behavior: Calm.   Attention/Concentration: normal.   Cognition: Intact.   Intelligence Estimate: Average.   Executive Function: Intact.   Insight: Intact.   Judgement: Intact.            Lab Review:   Office Visit on 05/21/2025   Component Date Value    POC  HEMOGLOBIN A1c 05/21/2025 6.0     WHITE BLOOD CELL COUNT 05/21/2025 8.6     RED BLOOD CELL COUNT 05/21/2025 4.82     HEMOGLOBIN 05/21/2025 15.4     HEMATOCRIT 05/21/2025 45.9 (H)     MCV 05/21/2025 95.2     MCH 05/21/2025 32.0     MCHC 05/21/2025 33.6     RDW 05/21/2025 13.1     PLATELET COUNT 05/21/2025 308     MPV 05/21/2025 11.9     ABSOLUTE NEUTROPHILS 05/21/2025 6,398     ABSOLUTE LYMPHOCYTES 05/21/2025 1,479     ABSOLUTE MONOCYTES 05/21/2025 654     ABSOLUTE EOSINOPHILS 05/21/2025 26     ABSOLUTE BASOPHILS 05/21/2025 43     NEUTROPHILS 05/21/2025 74.4     LYMPHOCYTES 05/21/2025 17.2     MONOCYTES 05/21/2025 7.6     EOSINOPHILS 05/21/2025 0.3     BASOPHILS 05/21/2025 0.5     T3, FREE 05/21/2025 3.6     T4, FREE 05/21/2025 1.5     GLUCOSE 05/21/2025 132 (H)     UREA NITROGEN (BUN) 05/21/2025 20     CREATININE 05/21/2025 0.73     EGFR 05/21/2025 88     SODIUM 05/21/2025 138     POTASSIUM 05/21/2025 4.2     CHLORIDE 05/21/2025 100     CARBON DIOXIDE 05/21/2025 27     ELECTROLYTE BALANCE 05/21/2025 11     CALCIUM 05/21/2025 10.3     PROTEIN, TOTAL 05/21/2025 7.4     ALBUMIN 05/21/2025 4.7     BILIRUBIN, TOTAL 05/21/2025 0.7     ALKALINE PHOSPHATASE 05/21/2025 49     AST 05/21/2025 21     ALT 05/21/2025 24    Office Visit on 02/24/2025   Component Date Value    POC HEMOGLOBIN A1c 02/24/2025 6.9 (A)    Orders Only on 02/13/2025   Component Date Value    CHOLESTEROL, TOTAL 02/13/2025 145     HDL CHOLESTEROL 02/13/2025 38 (L)     TRIGLYCERIDES 02/13/2025 160 (H)     LDL-CHOLESTEROL 02/13/2025 81     CHOL/HDLC RATIO 02/13/2025 3.8     NON HDL CHOLESTEROL 02/13/2025 107     DIRECT LDL 02/13/2025 93     GLUCOSE 02/13/2025 141 (H)     UREA NITROGEN (BUN) 02/13/2025 22     CREATININE 02/13/2025 0.84     EGFR 02/13/2025 75     SODIUM 02/13/2025 137     POTASSIUM 02/13/2025 4.3     CHLORIDE 02/13/2025 99     CARBON DIOXIDE 02/13/2025 27     ELECTROLYTE BALANCE 02/13/2025 11     CALCIUM 02/13/2025 10.1     PROTEIN, TOTAL  02/13/2025 7.1     ALBUMIN 02/13/2025 4.6     BILIRUBIN, TOTAL 02/13/2025 0.6     ALKALINE PHOSPHATASE 02/13/2025 61     AST 02/13/2025 17     ALT 02/13/2025 22     WHITE BLOOD CELL COUNT 02/13/2025 7.8     RED BLOOD CELL COUNT 02/13/2025 5.16 (H)     HEMOGLOBIN 02/13/2025 16.1 (H)     HEMATOCRIT 02/13/2025 48.2 (H)     MCV 02/13/2025 93.4     MCH 02/13/2025 31.2     MCHC 02/13/2025 33.4     RDW 02/13/2025 12.5     PLATELET COUNT 02/13/2025 292     MPV 02/13/2025 11.8     ABSOLUTE NEUTROPHILS 02/13/2025 4,875     ABSOLUTE LYMPHOCYTES 02/13/2025 1,927     ABSOLUTE MONOCYTES 02/13/2025 757     ABSOLUTE EOSINOPHILS 02/13/2025 203     ABSOLUTE BASOPHILS 02/13/2025 39     NEUTROPHILS 02/13/2025 62.5     LYMPHOCYTES 02/13/2025 24.7     MONOCYTES 02/13/2025 9.7     EOSINOPHILS 02/13/2025 2.6     BASOPHILS 02/13/2025 0.5     VITAMIN B12 02/13/2025 990     TSH W/REFLEX TO FT4 02/13/2025 1.40     VITAMIN D,25-OH,TOTAL,IA 02/13/2025 51     CREATININE, RANDOM URINE 02/13/2025 79     ALBUMIN, URINE 02/13/2025 0.2     ALBUMIN/CREATININE RATIO* 02/13/2025 3        Assessment/Plan   Psychiatric Risk Assessment  Violence Risk Assessment: none  Acute Risk of Harm to Others is Considered: low   Suicide Risk Assessment: age > 65 yrs old and   Protective Factors against Suicide: adherence to  treatment, fear of suicide, moral objections to suicide, positive family relationships, and sense of responsibility toward family  Acute Risk of Harm to Self is Considered: low    Imminent Risk of Suicide or Serious Self-Injury: Low   Chronic Risk of Suicide of Serious Self-Injury: Low  Risk factors: Age, depression history and   Protective factors: Denies current suicidal ideation, denies history of suicide attempts , willingness to seek help and support , gender, access to a variety of clinical interventions , and receiving and engaged in care for mental, physical, and substance use disorders      Imminent Risk of Violence or Homicide:  Low   Risk Factors: No significant risk factors identified on screening  Protective Factors: Lack of known history of harm to others , Lack of known history of violent ideation , and lack of known access to firearms.     Assessment & Plan  Obsessive thinking  Diagnosis: Recurrent anxiety and depression, obsessive compulsive features.     Treatment plan:   The FDA risks, benefits & alternatives to the medications prescribed were explained to you today. You were able to understand & repeat these risks, benefits & alternatives to these prescribed medications. You have agreed to proceed with treatment with the medications discussed based on the conclusion that the benefit outweighs the risks of this treatment regimen: continue duloxetine 60 mg twice daily and mirtazapine 30 mg nightly. We are increasing buspirone to 7.5 mg three times a day from current 5 mg twice daily.     Follow up in 2 to 3 weeks.     Orders:    busPIRone (Buspar) 7.5 mg tablet; Take 1 tablet (7.5 mg) by mouth 3 times a day. Take with breakfast and lunch daily.    Anxiety  Diagnosis: Recurrent anxiety and depression, obsessive compulsive features.     Treatment plan:   The FDA risks, benefits & alternatives to the medications prescribed were explained to you today. You were able to understand & repeat these risks, benefits & alternatives to these prescribed medications. You have agreed to proceed with treatment with the medications discussed based on the conclusion that the benefit outweighs the risks of this treatment regimen: continue duloxetine 60 mg twice daily and mirtazapine 30 mg nightly. We are increasing buspirone to 7.5 mg three times a day from current 5 mg twice daily.     Follow up in 2 to 3 weeks.  Orders:    busPIRone (Buspar) 7.5 mg tablet; Take 1 tablet (7.5 mg) by mouth 3 times a day. Take with breakfast and lunch daily.    Mixed anxiety and depressive disorder  Diagnosis: Recurrent anxiety and depression, obsessive compulsive  features.     Treatment plan:   The FDA risks, benefits & alternatives to the medications prescribed were explained to you today. You were able to understand & repeat these risks, benefits & alternatives to these prescribed medications. You have agreed to proceed with treatment with the medications discussed based on the conclusion that the benefit outweighs the risks of this treatment regimen: continue duloxetine 60 mg twice daily and mirtazapine 30 mg nightly. We are increasing buspirone to 7.5 mg three times a day from current 5 mg twice daily.     Follow up in 2 to 3 weeks.       Time:   Prep time on date of the patient encounter: 5 minutes.   Time spent directly with patient/family/caregiver: 30 minutes.   Additional time spent on patient care activities:  minutes.   Documentation time: 5 minutes.   Total time on date of patient encounter: 40 minutes.

## 2025-06-05 ENCOUNTER — APPOINTMENT (OUTPATIENT)
Dept: PRIMARY CARE | Facility: CLINIC | Age: 70
End: 2025-06-05
Payer: MEDICARE

## 2025-06-05 DIAGNOSIS — F41.9 ANXIETY: Primary | ICD-10-CM

## 2025-06-05 DIAGNOSIS — F33.2 MODERATELY SEVERE RECURRENT MAJOR DEPRESSION (MULTI): ICD-10-CM

## 2025-06-05 ASSESSMENT — ANXIETY QUESTIONNAIRES
2. NOT BEING ABLE TO STOP OR CONTROL WORRYING: MORE THAN HALF THE DAYS
5. BEING SO RESTLESS THAT IT IS HARD TO SIT STILL: MORE THAN HALF THE DAYS
IF YOU CHECKED OFF ANY PROBLEMS ON THIS QUESTIONNAIRE, HOW DIFFICULT HAVE THESE PROBLEMS MADE IT FOR YOU TO DO YOUR WORK, TAKE CARE OF THINGS AT HOME, OR GET ALONG WITH OTHER PEOPLE: SOMEWHAT DIFFICULT
GAD7 TOTAL SCORE: 12
1. FEELING NERVOUS, ANXIOUS, OR ON EDGE: NEARLY EVERY DAY
4. TROUBLE RELAXING: NEARLY EVERY DAY
7. FEELING AFRAID AS IF SOMETHING AWFUL MIGHT HAPPEN: NOT AT ALL
3. WORRYING TOO MUCH ABOUT DIFFERENT THINGS: MORE THAN HALF THE DAYS
6. BECOMING EASILY ANNOYED OR IRRITABLE: NOT AT ALL

## 2025-06-05 ASSESSMENT — PATIENT HEALTH QUESTIONNAIRE - PHQ9
9. THOUGHTS THAT YOU WOULD BE BETTER OFF DEAD, OR OF HURTING YOURSELF: NOT AT ALL
4. FEELING TIRED OR HAVING LITTLE ENERGY: MORE THAN HALF THE DAYS
2. FEELING DOWN, DEPRESSED OR HOPELESS: MORE THAN HALF THE DAYS
6. FEELING BAD ABOUT YOURSELF - OR THAT YOU ARE A FAILURE OR HAVE LET YOURSELF OR YOUR FAMILY DOWN: NOT AT ALL
8. MOVING OR SPEAKING SO SLOWLY THAT OTHER PEOPLE COULD HAVE NOTICED. OR THE OPPOSITE, BEING SO FIGETY OR RESTLESS THAT YOU HAVE BEEN MOVING AROUND A LOT MORE THAN USUAL: NOT AT ALL
7. TROUBLE CONCENTRATING ON THINGS, SUCH AS READING THE NEWSPAPER OR WATCHING TELEVISION: NOT AT ALL
1. LITTLE INTEREST OR PLEASURE IN DOING THINGS: MORE THAN HALF THE DAYS
3. TROUBLE FALLING OR STAYING ASLEEP: SEVERAL DAYS
5. POOR APPETITE OR OVEREATING: MORE THAN HALF THE DAYS
SUM OF ALL RESPONSES TO PHQ9 QUESTIONS 1 & 2: 4
SUM OF ALL RESPONSES TO PHQ QUESTIONS 1-9: 9

## 2025-06-05 NOTE — PROGRESS NOTES
Collaborative Care (CoCM)  Progress Note    Type of Interaction: In Office    Start Time: 10:58 AM     End Time: 11:43 am     Appointment: Scheduled    Reason for Visit:   Chief Complaint   Patient presents with    Anxiety    Patient reported that she has been feeling the same with her anxiety. She reported her Buspar was increased. She reported that she has been utilizing her coping kraig for anxiety. She stated that she is anxious over finances and she will is worried about having to tell her son she cannot help him any longer. M reviewed grounding techniques to  anxiety.     Interval History / Patient Symptoms:     Patient Health Questionnaire-9 Score: 9 (2025 11:48 AM)  JORGE A-7 Total Score: 12 (2025 11:48 AM)    Interventions Provided: Develop Coping Strategies and Review Progress/Goals Stress Management  B[facility] reviewed grounding techniques with the patient to decrease symptoms of anxiety- 5-4-3-2-1, categories, body awareness, and mental exercises.     Progress Made: N/A    Response to Intervention: Patient was engaged in the session and intervention with the M.     Plan:   Patient is scheduled in 2 weeks.   Care Plan    There is no care plan documentation to display.         There are no Patient Instructions on file for this visit.    Continue counseling   Follow Up / Next Appointment:    25 @ 10:00 am

## 2025-06-11 NOTE — PROGRESS NOTES
"Patient ID: Alexia Cho \"Mary\" is a 70 y.o. female who presents for Follow-up (Pt here for follow up and review. Pt had pelvic exam at last visit-note in chart is incorrect. Denies any new problems or concerns at this time. Last A1C was 5/21/25, results=6.0%)    (LAST VISIT PLAN FROM: 05/21/2025:  Patient Instructions:  Do not take the 12,5 mg Mounjaro this Friday. We reduced your dose to 10 mg weekly starting either Friday or as soon as it arrives from .  If you start having more low sugars , then decrease insulin by 4 units daily, or call if you are not sure what to do.  Metoprolol succinate/Toprol:: Sent 50 mg tabs and take one per day. This replaces the 1/2 tab of 100 mg daily.  Increase metamucil to 5 caps daily can split the dose.  Follow up for weight check and pelvic exam (routine) in 4-6 weeks.  Shingrix 2nd dose due.  We note that you did not have one in 2021, but had fist dose in October 2024.  RSV vaccine recommended this year , can be August (or sooner if RSV flares in the area)  Flu and covid boosters fall 2025.  Yeast: fluconazole: one tab by mouth daily for 3 days. If symptoms persist beyond a week or if they re occur in the next 2 weeks, can refill it and take one per day for 3 days again. Also finish the current vaginal monistat you are using.  Watch bp at home, checl at least weekly, if top number is 100 or lower call about adjusting medication , possibly holding hydrochlorothiazide.  END LAST VISIT PLAN)  -------------------------------------------  HPI  Patient is a 70-year-old who presents today for follow up on the following conditions:    Anxiety:  Depression:  Patient recently started on BuSpar 7.5 mg X3 daily (after breakfast, after lunch, and after dinner).  She has been taking Cymbalta 60 mg X2 daily and Remeron 30 mg nightly.  Patient follows with Dr. Phelsp, Behavioral Health, with upcoming appointment on 06/27/2025. Patient reports that she is still waking up feeling anxious " and nauseous.  She is working on coping strategies.  She is to see a therapist every week. I reviewed the note from her last visit 06/17/2025.  Patient is worrying about her grandson and finances.  She reports dizziness and nausea correlating with anxiety.  She has checked her BP when dizziness occurs which has been running in the 120s/70s and sometimes in the 60s diastolic.    Weight Management:  She states her eating is about the same.  She has lost 3 pounds in 4 weeks. Patient reports that yesterday she had scrambled eggs for breakfast and a cheese burger with fruit and chocolate pudding for dinner.  Will continue to monitor weight loss with follow up in one month for weight check.    Hypertension:  Aortic Valve Sclerosis:  BP today is 103/55. BP running in the 120s/70s and sometimes 60s diastolic at home.  Follow up on cardiovascular conditions:  Patient continues on amlodipine 10 mg daily, metoprolol succinate XL 50 mg daily, ramipril 10 mg every 12 hours, spironolactone 25 mg daily, and hydrochlorothiazide 12.5 mg daily.   We discussed holding hydrochlorothiazide for now.  Advised to call if BP starts running in the 140s or higher and we will consider restarting it.  Cardiac:   Chest pain?No  Palpitations? No  Increased luna?  No  Other:  Resp:   Shortness of breath?No  Wheezing No  Cough No  Other:  Extremities:   Leg or ankle swelling?No   Claudication?No  Other:  Neuro:  Dizziness: Yes correlating with anxiety, as per patient.  SyncopeNo   Blurred visionNo  New headaches No  Other:  Medications:Taking them regularly.Yes  Side effects.No    Hyperlipidemia:  No myalgias, nausea, abdominal pain.  Meds: Taking regularly, no adverse effect.  Patient continues on atorvastatin 20 mg daily.  Labs:  Last LDL direct was 93 mg/dL, at goal, with triglycerides at 160 mg/dL on 02/13/2025.  LDL goal: <100 mg/dL.  ASCVD risk 16.7%    Diabetes:  Type II:  Last A1c was 6.0% on 05/21/2025.  Is taking medications regularly,  denies side effects.  Patient continues on Farxiga 10 mg daily, Lantus U-100 insulin 22 units in the evening (Pharm D) and Mounjaro below.  Denies hypoglycemia, polyuria, polydipsia.  No new numbness or paresthesias of extremities. No syncope or dizziness. No blurred vision.  Lab Results   Component Value Date    HGBA1C 6.0 05/21/2025    HGBA1C 6.9 (A) 02/24/2025    HGBA1C 8.5 (A) 11/25/2024     Lab Results   Component Value Date    LDLCALC 81 02/13/2025    CREATININE 0.73 05/21/2025      Lab Results   Component Value Date    GLUCOSE 132 (H) 05/21/2025    CALCIUM 10.3 05/21/2025     05/21/2025    K 4.2 05/21/2025    CO2 27 05/21/2025     05/21/2025    BUN 20 05/21/2025    CREATININE 0.73 05/21/2025        GLP-1 agonists (Mounjaro 10 mg injection every 7 days: No vomiting, abdominal pain and anterior neck pain/swelling.      SGLT-2 inhibitors (Farxiga 10 mg daily): No hematuria, dysuria, or yeast infections.    I reviewed her CGM:  Continuous Glucose Monitor Data:  Duration (days) %Above Target % In Range %Low     Average glucose Usage rate/scans per day    Set Target Range (low-high)  7 days    23%  68%  9%    14 days   18%  70%  12%    30 days    Similar as above for 30 days.    90 days  Any lows symptomatic?  She reports once incidence of low blood sugar in the 40s where she was symptomatic.  She has had 6 lows in the last 14 days, 7 lows in the last 30 and 3 events in the last week.    Denies leg edema or bowel concerns.    We discussed reducing her Mounjaro to 7.5 mg weekly and her Lantus insulin from 22 units to 18 units and holding her hydrochlorothiazide.  Advised if any further sugars of 60 or below, without obvious reason, decrease Lantus to 16 units daily and let me know. We discussed that if she notices near the time of taking her Lantus that her sugar is dropping, advised to hold Lantus temporarily, eat something and see if it starts to come back up. We discussed that she can delay the Lantus  "to bedtime and also decrease by 4 units in that situation.   Advised if the sugar is not coming up easily, then skip Lantus that night.  Advised to keep me posted (or Deshaun) on any issues with the above.    Order placed for prescription as required.    Follow up 07/23/2025 for nurse visit for weight recheck.    Review of Systems  See ROS in HPI    Current Outpatient Medications   Medication Instructions    amLODIPine (NORVASC) 10 mg, oral, Daily    amoxicillin (AMOXIL) 2,000 mg, oral, As needed    atorvastatin (LIPITOR) 20 mg, oral, Nightly    BD Insulin Syringe, half unit, 0.3 mL 31 gauge x 5/16\" syringe 1 each, subcutaneous, Daily    busPIRone (BUSPAR) 7.5 mg, oral, 3 times daily, Take with breakfast and lunch daily.    cholecalciferol (Vitamin D-3) 50 mcg (2,000 unit) capsule Take by mouth.    cyanocobalamin (Vitamin B-12) 1,000 mcg tablet 1 tablet, Daily    docusate calcium (SURFAK) 240 mg, oral, Daily PRN    DULoxetine (CYMBALTA) 60 mg, oral, 2 times daily    estradiol (Estrace) 0.01 % (0.1 mg/gram) vaginal cream insert 1/4 gram vaginally daily or as directed and rub SMALL AMOUNT OF CREAM EXTERNALY    Farxiga 10 mg TAKE 1 TABLET EVERY MORNINGBEFORE A MEAL.    FreeStyle Lancets 28 gauge use 1 LANCET to TEST BLOOD SUGAR twice a day    FreeStyle Darren 14 Day Sensor kit APPLY DEVICE TO POSTERIOR ARM AND LEAVE FOR 14 DAYS. USE REMOTE TO SCAN AND REVIEW SUGARS    FreeStyle Lite Strips strip use 1 TEST STRIP to TEST BLOOD SUGAR once daily    hydroCHLOROthiazide (MICROZIDE) 12.5 mg, oral, Daily    Lactobacillus rhamnosus GG (CULTURELLE) 15 billion cell capsule, sprinkle capsule Take by mouth.    Lantus U-100 Insulin 24 Units, subcutaneous, Nightly, Take as directed per insulin instructions.    metoprolol succinate XL (TOPROL-XL) 50 mg, oral, Daily, Do not crush or chew.    mirtazapine (REMERON) 30 mg, oral, Nightly    Mounjaro 7.5 mg, subcutaneous, Weekly    psyllium (MetamuciL) 0.4 gram capsule 4 or 5 caps with at " "least 8 oz of water, by mouth daily.    ramipril (ALTACE) 10 mg, oral, Every 12 hours    spironolactone (ALDACTONE) 25 mg, oral, Daily     RX Allergies[1]  Objective    The following test results have been reviewed:  Latest Complete Lab Results:  CBC:   Lab Results   Component Value Date    WBC 8.6 05/21/2025    RBC 4.82 05/21/2025    HGB 15.4 05/21/2025    HCT 45.9 (H) 05/21/2025    MCV 95.2 05/21/2025    MCH 32.0 05/21/2025    MCHC 33.6 05/21/2025     05/21/2025    MPV 11.9 05/21/2025     CMP:  Lab Results   Component Value Date    GLUCOSE 132 (H) 05/21/2025     05/21/2025    K 4.2 05/21/2025     05/21/2025    CO2 27 05/21/2025    ANIONGAP 11 05/21/2025    BUN 20 05/21/2025    CREATININE 0.73 05/21/2025    GFRF 82 09/05/2023    CALCIUM 10.3 05/21/2025    ALBUMIN 4.7 05/21/2025    ALKPHOS 49 05/21/2025    PROT 7.4 05/21/2025    AST 21 05/21/2025    BILITOT 0.7 05/21/2025    ALT 24 05/21/2025      Lipid:   Lab Results   Component Value Date    CHOL 145 02/13/2025    HDL 38 (L) 02/13/2025    CHHDL 3.8 02/13/2025    LDLF 82 01/20/2023    VLDL 21 12/15/2023    TRIG 160 (H) 02/13/2025     LDL Direct:  Lab Results   Component Value Date    LDLDIRECT 93 02/13/2025      TSH:   Lab Results   Component Value Date    TSH 1.40 02/13/2025      B12:  Lab Results   Component Value Date    HNCYCAWG44 990 02/13/2025     Vitamin D:  Lab Results   Component Value Date    VITD25 51 02/13/2025      HgA1c:   Lab Results   Component Value Date    HGBA1C 6.0 05/21/2025    [id number] 169 03/14/2024       Physical Exam  Vitals:      5/15/2024    10:40 AM 8/12/2024     9:39 AM 11/25/2024    10:15 AM 2/24/2025     8:12 AM 3/7/2025    10:24 AM 5/21/2025     9:45 AM 6/18/2025     7:58 AM   Vitals   Systolic 112 100 113 104  104 103   Diastolic 52 52 65 54  66 55   BP Location Right arm         Heart Rate 60 60 89 64  70 83   Temp   36.9 °C (98.4 °F)       Resp 18 18 16 18  18 18   Height 1.676 m (5' 6\") 1.664 m (5' 5.5\") " "1.651 m (5' 5\") 1.651 m (5' 5\") 1.651 m (5' 5\") 1.651 m (5' 5\") 1.626 m (5' 4\")   Weight (lb) 178 170 167.6 163 163 148 145   BMI 28.73 kg/m2 27.86 kg/m2 27.89 kg/m2 27.12 kg/m2 27.12 kg/m2 24.63 kg/m2 24.89 kg/m2   BSA (m2) 1.94 m2 1.89 m2 1.87 m2 1.84 m2 1.84 m2 1.75 m2 1.72 m2   Visit Report Report Report Report Report  Report Report     Patient looked mildly anxious-appearing, is known to me, and is in no obvious distress.  HEENT:   Normocephalic, no facial asymmetry  Eyes: Sclera and conjunctiva are clear.  Neck: No adenopathy cervical (Ant/post/lat), no Supraclavicular nodes.   Thyroid normal.  Carotid pulses normal, no carotid bruits.  Lungs : RR normal. Clear to auscultation anterior, posterior and lateral. No rales, wheezes rhonchi or rubs. Good air exchange.  Heart: RRR. No Murmur, gallop, click or rub.  Abdomen: Bowel sounds normal, No bruits. No pulsatile mass. No hepatosplenomegaly, masses or tenderness. Soft, no guarding.  Vascular:  Posterior tibialis pulses at 1-2+, bilaterally.   Extremities: No upper extremity edema. No lower extremity edema.   Musculoskeletal: No synovitis of joints seen. No new deformity.  Neuro: CN 2-12 intact. Alert, appropriate.  Ambulates independently.  No gross motor deficit.   No tremors.  Psych: Mildly anxious appearing.  Skin: No rash, bruising petechiae or jaundice.    Alexia \"Mary\" was seen today for follow-up.  Diagnoses and all orders for this visit:  Hyperlipidemia due to type 2 diabetes mellitus (Multi) (Primary)  Hypertension, essential  Controlled type 2 diabetes mellitus with hyperglycemia, with long-term current use of insulin (Multi)  -     tirzepatide (Mounjaro) 7.5 mg/0.5 mL pen injector; Inject 7.5 mg under the skin 1 (one) time per week.  Mixed anxiety and depressive disorder  Weight loss, unintentional  Medication dose changed      Adjusting meds with current weight loss causing need for less meds-when anxiety improves diet will likely change and may " need to adjust back up . Close monitoring.  See patient instructions in wrap up plan, orders and comments for treatment plan.  Patient Instructions:   Hold hydrochlorothiazide.  Call  if blood pressures running 140 or higher top number and will consider restarting it.  Decreased lantus from 22 units to 18 units daily.   Decrease mounjaro to 7.5 mg daily, set the 10 mg aside as may go back to that in the future..    If any further sugars of 60 or below, without obvious reason, decrease lantus to 16 units daily and let me know. If you notice near the time of taking your lantus that your sugar is dropping, hold lantus temporarily, eat something and see if it starts to come back up. You can delay the lantus to bedtime and also decrease by 4 units in that situation. If the sugar is not coming up easily then skip lantus that night,. Keep me posted (or Deshaun) on any issues with the above.    Keep August 25 appt. Call if issues with ongoing low sugars prior.  Nurse visit weight check week of July 21.    I am the Internal Medicine physician providing ongoing chronic medical care for this patient, which is managed during and in between office visits.    Scribe Attestation  By signing my name below, IJayla, Scrnoe   attest that this documentation has been prepared under the direction and in the presence of Nat Wasserman MD.          [1]   Allergies  Allergen Reactions    Ciprofloxacin Hives    Diclofenac Sodium Unknown    Erythromycin Base Diarrhea    Naproxen Diarrhea

## 2025-06-13 PROCEDURE — RXMED WILLOW AMBULATORY MEDICATION CHARGE

## 2025-06-16 ENCOUNTER — PHARMACY VISIT (OUTPATIENT)
Dept: PHARMACY | Facility: CLINIC | Age: 70
End: 2025-06-16
Payer: MEDICARE

## 2025-06-17 ENCOUNTER — APPOINTMENT (OUTPATIENT)
Dept: PRIMARY CARE | Facility: CLINIC | Age: 70
End: 2025-06-17
Payer: MEDICARE

## 2025-06-17 DIAGNOSIS — F33.2 MODERATELY SEVERE RECURRENT MAJOR DEPRESSION (MULTI): ICD-10-CM

## 2025-06-17 DIAGNOSIS — F41.9 ANXIETY: Primary | ICD-10-CM

## 2025-06-17 ASSESSMENT — ANXIETY QUESTIONNAIRES
IF YOU CHECKED OFF ANY PROBLEMS ON THIS QUESTIONNAIRE, HOW DIFFICULT HAVE THESE PROBLEMS MADE IT FOR YOU TO DO YOUR WORK, TAKE CARE OF THINGS AT HOME, OR GET ALONG WITH OTHER PEOPLE: SOMEWHAT DIFFICULT
1. FEELING NERVOUS, ANXIOUS, OR ON EDGE: NEARLY EVERY DAY
5. BEING SO RESTLESS THAT IT IS HARD TO SIT STILL: SEVERAL DAYS
GAD7 TOTAL SCORE: 12
6. BECOMING EASILY ANNOYED OR IRRITABLE: SEVERAL DAYS
7. FEELING AFRAID AS IF SOMETHING AWFUL MIGHT HAPPEN: NOT AT ALL
2. NOT BEING ABLE TO STOP OR CONTROL WORRYING: MORE THAN HALF THE DAYS
4. TROUBLE RELAXING: NEARLY EVERY DAY
3. WORRYING TOO MUCH ABOUT DIFFERENT THINGS: MORE THAN HALF THE DAYS

## 2025-06-17 ASSESSMENT — PATIENT HEALTH QUESTIONNAIRE - PHQ9
6. FEELING BAD ABOUT YOURSELF - OR THAT YOU ARE A FAILURE OR HAVE LET YOURSELF OR YOUR FAMILY DOWN: MORE THAN HALF THE DAYS
1. LITTLE INTEREST OR PLEASURE IN DOING THINGS: MORE THAN HALF THE DAYS
5. POOR APPETITE OR OVEREATING: MORE THAN HALF THE DAYS
7. TROUBLE CONCENTRATING ON THINGS, SUCH AS READING THE NEWSPAPER OR WATCHING TELEVISION: NOT AT ALL
2. FEELING DOWN, DEPRESSED OR HOPELESS: MORE THAN HALF THE DAYS
SUM OF ALL RESPONSES TO PHQ QUESTIONS 1-9: 10
8. MOVING OR SPEAKING SO SLOWLY THAT OTHER PEOPLE COULD HAVE NOTICED. OR THE OPPOSITE, BEING SO FIGETY OR RESTLESS THAT YOU HAVE BEEN MOVING AROUND A LOT MORE THAN USUAL: NOT AT ALL
9. THOUGHTS THAT YOU WOULD BE BETTER OFF DEAD, OR OF HURTING YOURSELF: NOT AT ALL
4. FEELING TIRED OR HAVING LITTLE ENERGY: SEVERAL DAYS
3. TROUBLE FALLING OR STAYING ASLEEP: SEVERAL DAYS
SUM OF ALL RESPONSES TO PHQ9 QUESTIONS 1 & 2: 4

## 2025-06-17 NOTE — PROGRESS NOTES
Collaborative Care (CoCM)  Progress Note    Type of Interaction: In Office    Start Time: 10:00 am     End Time: 10:45 am     Appointment: Scheduled    Reason for Visit:   Chief Complaint   Patient presents with    Anxiety    Patient reported that she has been waking up anxious every morning. She reported that she started to feel better as the day goes on. She reported that she has been utilizing the coping skills for anxiety and grounding skill worksheet to decrease symptoms for anxiety. Patient reported she is still worrying about her grandson. She reported that she is figuring out her financial concerns. [facility] reviewed coping skills for anxiety with patient.     Interval History / Patient Symptoms:     Patient Health Questionnaire-9 Score: 10 (6/17/2025 11:14 AM)  JORGE A-7 Total Score: 12 (6/17/2025 11:14 AM)      Interventions Provided: Develop Coping Strategies and Review Progress/Goals Stress Management    Progress Made: N/A    Response to Intervention: Patient was engaged in the session.     Plan:   Patient is scheduled in 1 week.  Care Plan    There is no care plan documentation to display.         There are no Patient Instructions on file for this visit.    Continue counseling   Follow Up / Next Appointment:    06/25/25 @ 11:00 am

## 2025-06-18 ENCOUNTER — APPOINTMENT (OUTPATIENT)
Dept: PRIMARY CARE | Facility: CLINIC | Age: 70
End: 2025-06-18
Payer: MEDICARE

## 2025-06-18 VITALS
DIASTOLIC BLOOD PRESSURE: 55 MMHG | RESPIRATION RATE: 18 BRPM | HEART RATE: 83 BPM | SYSTOLIC BLOOD PRESSURE: 103 MMHG | HEIGHT: 64 IN | WEIGHT: 145 LBS | BODY MASS INDEX: 24.75 KG/M2

## 2025-06-18 DIAGNOSIS — I10 HYPERTENSION, ESSENTIAL: ICD-10-CM

## 2025-06-18 DIAGNOSIS — E11.65 CONTROLLED TYPE 2 DIABETES MELLITUS WITH HYPERGLYCEMIA, WITH LONG-TERM CURRENT USE OF INSULIN (MULTI): ICD-10-CM

## 2025-06-18 DIAGNOSIS — Z79.899 MEDICATION DOSE CHANGED: ICD-10-CM

## 2025-06-18 DIAGNOSIS — Z79.4 CONTROLLED TYPE 2 DIABETES MELLITUS WITH HYPERGLYCEMIA, WITH LONG-TERM CURRENT USE OF INSULIN (MULTI): ICD-10-CM

## 2025-06-18 DIAGNOSIS — F41.8 MIXED ANXIETY AND DEPRESSIVE DISORDER: ICD-10-CM

## 2025-06-18 DIAGNOSIS — R63.4 WEIGHT LOSS, UNINTENTIONAL: ICD-10-CM

## 2025-06-18 DIAGNOSIS — E78.5 HYPERLIPIDEMIA DUE TO TYPE 2 DIABETES MELLITUS (MULTI): Primary | ICD-10-CM

## 2025-06-18 DIAGNOSIS — E11.69 HYPERLIPIDEMIA DUE TO TYPE 2 DIABETES MELLITUS (MULTI): Primary | ICD-10-CM

## 2025-06-18 PROCEDURE — 3044F HG A1C LEVEL LT 7.0%: CPT | Performed by: INTERNAL MEDICINE

## 2025-06-18 PROCEDURE — 3008F BODY MASS INDEX DOCD: CPT | Performed by: INTERNAL MEDICINE

## 2025-06-18 PROCEDURE — 4010F ACE/ARB THERAPY RXD/TAKEN: CPT | Performed by: INTERNAL MEDICINE

## 2025-06-18 PROCEDURE — 1159F MED LIST DOCD IN RCRD: CPT | Performed by: INTERNAL MEDICINE

## 2025-06-18 PROCEDURE — 3078F DIAST BP <80 MM HG: CPT | Performed by: INTERNAL MEDICINE

## 2025-06-18 PROCEDURE — G2211 COMPLEX E/M VISIT ADD ON: HCPCS | Performed by: INTERNAL MEDICINE

## 2025-06-18 PROCEDURE — 99214 OFFICE O/P EST MOD 30 MIN: CPT | Performed by: INTERNAL MEDICINE

## 2025-06-18 PROCEDURE — 3074F SYST BP LT 130 MM HG: CPT | Performed by: INTERNAL MEDICINE

## 2025-06-18 PROCEDURE — 1126F AMNT PAIN NOTED NONE PRSNT: CPT | Performed by: INTERNAL MEDICINE

## 2025-06-18 PROCEDURE — 1160F RVW MEDS BY RX/DR IN RCRD: CPT | Performed by: INTERNAL MEDICINE

## 2025-06-18 RX ORDER — TIRZEPATIDE 7.5 MG/.5ML
7.5 INJECTION, SOLUTION SUBCUTANEOUS WEEKLY
Qty: 2 ML | Refills: 2 | Status: SHIPPED | OUTPATIENT
Start: 2025-06-18

## 2025-06-18 ASSESSMENT — PAIN SCALES - GENERAL: PAINLEVEL_OUTOF10: 0-NO PAIN

## 2025-06-18 NOTE — PATIENT INSTRUCTIONS
Hold hydrochlorothiazide.  Call  if blood pressures running 140 or higher top number and will consider restarting it.  Decreased lantus from 22 units to 18 units daily.   Decrease mounjaro to 7.5 mg daily, set the 10 mg aside as may go back to that in the future..    If any further sugars of 60 or below, without obvious reason, decrease lantus to 16 units daily and let me know. If you notice near the time of taking your lantus that your sugar is dropping, hold lantus temporarily, eat something and see if it starts to come back up. You can delay the lantus to bedtime and also decrease by 4 units in that situation. If the sugar is not coming up easily then skip lantus that night,. Keep me posted (or Deshaun) on any issues with the above.    Keep August 25 appt. Call if issues with ongoing low sugars prior.  Nurse visit weight check week of July 21.

## 2025-06-25 ENCOUNTER — SOCIAL WORK (OUTPATIENT)
Dept: PRIMARY CARE | Facility: CLINIC | Age: 70
End: 2025-06-25
Payer: MEDICARE

## 2025-06-25 ENCOUNTER — APPOINTMENT (OUTPATIENT)
Dept: PHARMACY | Facility: HOSPITAL | Age: 70
End: 2025-06-25
Payer: MEDICARE

## 2025-06-25 DIAGNOSIS — E11.65 TYPE 2 DIABETES MELLITUS WITH HYPERGLYCEMIA, WITH LONG-TERM CURRENT USE OF INSULIN: ICD-10-CM

## 2025-06-25 DIAGNOSIS — F41.1 GENERALIZED ANXIETY DISORDER: ICD-10-CM

## 2025-06-25 DIAGNOSIS — F41.9 ANXIETY: Primary | ICD-10-CM

## 2025-06-25 DIAGNOSIS — F33.2 MODERATELY SEVERE RECURRENT MAJOR DEPRESSION (MULTI): ICD-10-CM

## 2025-06-25 DIAGNOSIS — Z79.4 TYPE 2 DIABETES MELLITUS WITH HYPERGLYCEMIA, WITH LONG-TERM CURRENT USE OF INSULIN: ICD-10-CM

## 2025-06-25 ASSESSMENT — PATIENT HEALTH QUESTIONNAIRE - PHQ9
3. TROUBLE FALLING OR STAYING ASLEEP: SEVERAL DAYS
5. POOR APPETITE OR OVEREATING: MORE THAN HALF THE DAYS
1. LITTLE INTEREST OR PLEASURE IN DOING THINGS: MORE THAN HALF THE DAYS
4. FEELING TIRED OR HAVING LITTLE ENERGY: SEVERAL DAYS
2. FEELING DOWN, DEPRESSED OR HOPELESS: MORE THAN HALF THE DAYS
9. THOUGHTS THAT YOU WOULD BE BETTER OFF DEAD, OR OF HURTING YOURSELF: NOT AT ALL
7. TROUBLE CONCENTRATING ON THINGS, SUCH AS READING THE NEWSPAPER OR WATCHING TELEVISION: NOT AT ALL
SUM OF ALL RESPONSES TO PHQ QUESTIONS 1-9: 8
SUM OF ALL RESPONSES TO PHQ9 QUESTIONS 1 & 2: 4
6. FEELING BAD ABOUT YOURSELF - OR THAT YOU ARE A FAILURE OR HAVE LET YOURSELF OR YOUR FAMILY DOWN: NOT AT ALL
8. MOVING OR SPEAKING SO SLOWLY THAT OTHER PEOPLE COULD HAVE NOTICED. OR THE OPPOSITE, BEING SO FIGETY OR RESTLESS THAT YOU HAVE BEEN MOVING AROUND A LOT MORE THAN USUAL: NOT AT ALL

## 2025-06-25 ASSESSMENT — ANXIETY QUESTIONNAIRES
GAD7 TOTAL SCORE: 10
5. BEING SO RESTLESS THAT IT IS HARD TO SIT STILL: SEVERAL DAYS
7. FEELING AFRAID AS IF SOMETHING AWFUL MIGHT HAPPEN: NOT AT ALL
1. FEELING NERVOUS, ANXIOUS, OR ON EDGE: NEARLY EVERY DAY
6. BECOMING EASILY ANNOYED OR IRRITABLE: NOT AT ALL
4. TROUBLE RELAXING: MORE THAN HALF THE DAYS
2. NOT BEING ABLE TO STOP OR CONTROL WORRYING: MORE THAN HALF THE DAYS
3. WORRYING TOO MUCH ABOUT DIFFERENT THINGS: MORE THAN HALF THE DAYS
IF YOU CHECKED OFF ANY PROBLEMS ON THIS QUESTIONNAIRE, HOW DIFFICULT HAVE THESE PROBLEMS MADE IT FOR YOU TO DO YOUR WORK, TAKE CARE OF THINGS AT HOME, OR GET ALONG WITH OTHER PEOPLE: SOMEWHAT DIFFICULT

## 2025-06-25 NOTE — PROGRESS NOTES
Patient is sent at the request of Nat Wasserman MD for my opinion regarding Type 2 diabetes.  My final recommendations will be communicated back to the requesting provider by way of shared medical record.    Subjective     Prior to follow-up today, patient met with PCP and dose of Mounjaro and Lantus were decreased d/t concerns for low BG.      Diabetes  She has type 2 diabetes mellitus. There are no hypoglycemic complications. Risk factors for coronary artery disease include diabetes mellitus, dyslipidemia and hypertension. An ACE inhibitor/angiotensin II receptor blocker is being taken.     Patient denies hypoglycemic events in the past week. Reports less nausea with dose decrease to Mounjaro.     Reports only one instances of symptomatic hypoglycemia.     Patient endorses issues with depression and anxiety. Her diet has improved some with dose decrease to Mounjaro.     Past Medical History:  She has a past medical history of Abnormal finding on breast imaging (08/10/2015), Abnormal levels of other serum enzymes (03/11/2013), Abnormal weight gain (09/23/2019), Abnormal weight loss (02/24/2025), Acute candidiasis of vulva and vagina (02/01/2017), Acute pain of right knee (03/17/2016), Acute stress reaction (05/01/2019), Acute upper respiratory infection, unspecified (12/07/2017), Adverse effect of insulin and oral hypoglycemic (antidiabetic) drugs, initial encounter (12/08/2019), Allergy status to unspecified drugs, medicaments and biological substances (02/14/2019), Anxiety, Arthritis, Arthritis of knee, right (05/09/2023), Benign and innocent cardiac murmurs, Bilateral primary osteoarthritis of knee (04/27/2016), Body mass index (BMI) 29.0-29.9, adult (02/03/2020), Body mass index (BMI) 29.0-29.9, adult (03/18/2020), Decreased range of motion (ROM) of knee (03/17/2016), Dependent relative needing care at home (06/13/2017), Diaphragmatic hernia without obstruction or gangrene (10/21/2015), Effusion,  unspecified knee (06/19/2014), Encounter for general adult medical examination without abnormal findings (02/03/2020), Encounter for immunization (10/20/2016), Encounter for immunization, Encounter for other preprocedural examination (01/31/2016), Eyelid retraction right lower eyelid (05/09/2023), Hematuria (05/09/2023), Hypertension, Impacted cerumen, left ear (06/24/2019), Laxity of eyelid (05/09/2023), Long term (current) use of antibiotics (04/21/2015), Long term (current) use of insulin (Multi) (07/11/2021), Major depressive disorder, 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 psychotic features (Multi) (04/11/2021), Major depressive disorder, 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 psychotic features (Multi) (07/14/2020), Major depressive disorder, recurrent, moderate (02/03/2020), Metformin adverse reaction (05/09/2023), Obstructive sleep apnea (adult) (pediatric) (07/06/2021), Osteoarthritis of right knee (01/23/2015), Other abnormal and inconclusive findings on diagnostic imaging of breast (09/18/2017), Other conditions influencing health status (06/13/2017), Other long term (current) drug therapy (01/02/2022), 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), Other specified abnormal immunological findings in serum (03/11/2013), Other specified counseling (07/22/2020), Other specified counseling (06/25/2020), Other specified disorders of nose and nasal sinuses (01/30/2015), Other specified symptoms and signs involving the circulatory and respiratory systems (07/21/2016), Overweight, Pain in unspecified knee, Pain in unspecified knee (02/21/2014), Papillomavirus as the cause of diseases classified elsewhere, Personal history of diseases of the blood and blood-forming organs and certain disorders involving the immune mechanism, Personal history of diseases of the skin and subcutaneous tissue, Personal history of other diseases of the circulatory system, Personal history of other diseases of the respiratory system (03/15/2018),  Personal history of other drug therapy, Personal history of other drug therapy (09/18/2017), Personal history of other endocrine, nutritional and metabolic disease (10/02/2018), Personal history of other mental and behavioral disorders (04/10/2019), Personal history of other specified conditions (01/19/2018), Personal history of other specified conditions (09/23/2019), Personal history of other specified conditions (07/22/2015), Personal history of other specified conditions (03/17/2017), Personal history of pneumonia (recurrent), Personal history of urinary (tract) infections, Personal history of urinary (tract) infections, Postpartum depression, Problem related to primary support group, unspecified (06/05/2019), Pulmonary hypertension (Multi) (05/09/2023), Rash and other nonspecific skin eruption (06/24/2013), Right leg weakness (03/17/2016), Type 2 diabetes mellitus with hyperglycemia (Multi) (12/21/2020), Type 2 diabetes mellitus with hyperglycemia, with long-term current use of insulin (01/23/2015), Unilateral primary osteoarthritis, right knee (01/27/2016), Unspecified exophthalmos (12/08/2019), Urethral caruncle (05/09/2023), Urinary tract infection, Urinary tract infection, site not specified (07/11/2021), Varicella, and Vitamin D deficiency, unspecified (10/05/2022).    Past Surgical History:  She has a past surgical history that includes Colonoscopy (05/29/2012); Esophagogastroduodenoscopy (05/29/2012); Other surgical history (Left, 2016); Total knee arthroplasty (07/22/2015); Other surgical history (02/03/2020); Total knee arthroplasty (04/21/2015); Joint replacement (2014); and Stella tooth extraction.    Social History:  She reports that she has never smoked. She has never been exposed to tobacco smoke. She has never used smokeless tobacco. She reports current alcohol use. She reports that she does not use drugs.    Family History:  Family History   Problem Relation Name Age of Onset    Hypertension  Mother Lindsay     Transient ischemic attack Mother Lindsay     Diverticulitis Mother Lindsay     Diverticulosis Mother Lindsay     Other (bladder cancer) Mother Lindsay     Hearing loss Mother Lindsay     Hypertension Father Fariha     Diabetes Father Fariha     Diabetes Son Kurtis     Hearing loss Son Kurtis        Allergies:  Ciprofloxacin, Diclofenac sodium, Erythromycin base, and Naproxen    Current diet: 3 meals per day, 1-2 times per week of fast food (was worse over the summer)  Breakfast: cereal with blueberries  Lunch: turkey sandwich with fruit and yogurt  Dinner: protein, vegetable, and carbohydrate  Snack: 2-3 times per day - was grabbing cookies has now transitioned to wheat thins with cheese or a piece of fruit  Drink: water or diet soda pop (1-2 cans), coffee in the morning    Current exercise: walking twice a week for 15 minutes midday    The patient does have a known family history of diabetes.    Patient is using: continuous glucose monitor  The patient is currently checking the blood glucose continuously.    Hypoglycemia frequency: rare  Hypoglycemia awareness: Yes     CGM Data 6/25/25  Time in ranges     7 day  Above: 16%  In range: 82%  Low: 2%   14 day  Above: 21%  In range: 74%  Low: 5%     Average glucose Time CGM Active   7 days average: 116 mg/dL  14 day average: 119 mg/dL  30 day average: 116 mg/dL 7 day  Scans per day: 4  Sensor data captured: 77%      Adverse Effects: denies     Objective     Last Recorded Vitals:  BP Readings from Last 6 Encounters:   06/18/25 103/55   05/21/25 104/66   02/24/25 104/54   11/25/24 113/65   08/12/24 100/52   05/15/24 112/52        Wt Readings from Last 6 Encounters:   06/18/25 65.8 kg (145 lb)   05/21/25 67.1 kg (148 lb)   03/07/25 73.9 kg (163 lb)   02/24/25 73.9 kg (163 lb)   11/25/24 76 kg (167 lb 9.6 oz)   08/12/24 77.1 kg (170 lb)       Diabetes Pharmacotherapy:  Mounjaro 7.5 mg once weekly  Farxiga 10 mg daily  Lantus 18 units in the  "morning    Previous DM therapy:  Metformin - diarrhea  Trulicity - alternative therapy with Mounjaro    Primary/Secondary Prevention   - Statin? Yes  - ACE-I/ARB? Yes  - Aspirin? No    Pertinent PMH Review:  - PMH of Pancreatitis: No  - PMH of Retinopathy: No  - PMH of Urinary Tract Infections: Yes, 3-4 years ago, does not have them often  - PMH of MTC: No    Lab Review  Lab Results   Component Value Date    BILITOT 0.7 05/21/2025    CALCIUM 10.3 05/21/2025    CO2 27 05/21/2025     05/21/2025    CREATININE 0.73 05/21/2025    GLUCOSE 132 (H) 05/21/2025    ALKPHOS 49 05/21/2025    K 4.2 05/21/2025    PROT 7.4 05/21/2025     05/21/2025    AST 21 05/21/2025    ALT 24 05/21/2025    BUN 20 05/21/2025    ANIONGAP 11 05/21/2025    MG 2.28 09/05/2023    ALBUMIN 4.7 05/21/2025    GFRF 82 09/05/2023     Lab Results   Component Value Date    TRIG 160 (H) 02/13/2025    CHOL 145 02/13/2025    LDLCALC 81 02/13/2025    HDL 38 (L) 02/13/2025     Lab Results   Component Value Date    HGBA1C 6.0 05/21/2025    HGBA1C 6.9 (A) 02/24/2025    HGBA1C 8.5 (A) 11/25/2024     No components found for: \"UACR\"  The 10-year ASCVD risk score (Elizabeth CAMPA, et al., 2019) is: 16.7%    Values used to calculate the score:      Age: 70 years      Sex: Female      Is Non- : No      Diabetic: Yes      Tobacco smoker: No      Systolic Blood Pressure: 103 mmHg      Is BP treated: Yes      HDL Cholesterol: 38 mg/dL      Total Cholesterol: 145 mg/dL    Health Maintenance:   Foot Exam: PCP checks at yearly physical, patient denies cuts or wounds on feet  Eye Exam: 9/2023  Urine Albumin: not available   Influenza Immunization: 11/21/2024  Pneumonia Immunization: up to date    Drug Interactions:  No significant drug interactions identified at this time    Assessment/Plan   Problem List Items Addressed This Visit       Moderately severe recurrent major depression (Multi)    Relevant Orders    Referral to Clinical Pharmacy "     Other Visit Diagnoses         Type 2 diabetes mellitus with hyperglycemia, with long-term current use of insulin        Relevant Orders    Referral to Clinical Pharmacy      Generalized anxiety disorder        Relevant Orders    Referral to Clinical Pharmacy        Patients diabetes well controlled with most recent A1c of 6.0% (Goal < 7%). Given further decrease to A1c and BG trends, will further decrease basal insulin dose. Continue current GLP-1 dose given improvement to side effects with therapy.   Continue  Mounjaro 7.5 mg once weekly   Farxiga 10 mg daily  Decrease  Lantus 16 units injected once daily in the morning  Scan CGM 4 times daily (morning, twice during the day, and at bedtime)  Follow-up: 7/30/2025 at 9:40 am via phone  PCP Follow-Up: 8/25/2025    Continue all meds under the continuation of care with the referring provider and clinical pharmacy team.

## 2025-06-25 NOTE — PROGRESS NOTES
[facility] ([facility])  Progress Note    Type of Interaction: In Office    Start Time: 11:00 am     End Time: 11:50 am     Appointment: Scheduled    Reason for Visit:   Chief Complaint   Patient presents with    Anxiety    Patient reported that she is anxious over her daughter-in-law being upset over her other's girlfriend's last significant other paying her other son for doing work to their house. She stated that this has caused stress for her. M reviewed coping skills for anxiety with the patient to decrease symptoms for anxiety.     Interval History / Patient Symptoms:     Patient Health Questionnaire-9 Score: 8 (6/25/2025 12:08 PM)  JORGE A-7 Total Score: 10 (6/25/2025 12:09 PM)    Interventions Provided: Develop Coping Strategies and Review Progress/Goals Stress Management  M reviewed coping skills for anxiety to decrease symptoms of anxiety.     Progress Made: Minimum    Response to Intervention: Patient was engaged in the session and intervention with the [facility].     Plan:   Patient is schedule in 2 weeks.   Care Plan    There is no care plan documentation to display.         There are no Patient Instructions on file for this visit.    Continue counseling   Follow Up / Next Appointment:    07/08/25 @ 10:00 am

## 2025-06-27 ENCOUNTER — APPOINTMENT (OUTPATIENT)
Dept: BEHAVIORAL HEALTH | Facility: CLINIC | Age: 70
End: 2025-06-27
Payer: MEDICARE

## 2025-06-27 DIAGNOSIS — F42.8 OBSESSIVE THINKING: ICD-10-CM

## 2025-06-27 DIAGNOSIS — F41.9 ANXIETY: ICD-10-CM

## 2025-06-27 DIAGNOSIS — F41.8 MIXED ANXIETY AND DEPRESSIVE DISORDER: ICD-10-CM

## 2025-06-27 PROCEDURE — 3044F HG A1C LEVEL LT 7.0%: CPT | Performed by: PSYCHIATRY & NEUROLOGY

## 2025-06-27 PROCEDURE — 4010F ACE/ARB THERAPY RXD/TAKEN: CPT | Performed by: PSYCHIATRY & NEUROLOGY

## 2025-06-27 PROCEDURE — 99214 OFFICE O/P EST MOD 30 MIN: CPT | Performed by: PSYCHIATRY & NEUROLOGY

## 2025-06-27 RX ORDER — BUSPIRONE HYDROCHLORIDE 10 MG/1
10 TABLET ORAL 3 TIMES DAILY
Qty: 90 TABLET | Refills: 1 | Status: SHIPPED | OUTPATIENT
Start: 2025-06-27 | End: 2025-08-26

## 2025-06-27 ASSESSMENT — ENCOUNTER SYMPTOMS
NERVOUS/ANXIOUS: 1
DEPRESSED MOOD: 1
DYSPHORIC MOOD: 1

## 2025-06-27 NOTE — PROGRESS NOTES
"Subjective   Patient ID: Alexia Cho \"Corina" is a 70 y.o. female who presents for No chief complaint on file. I am getting better.     Virtual Consent: The patient engaged in a telehealth session via Epic audio visual or phone with this provider practicing within the [facility]. The identity of the patient was verified by their date of birth and last four digits of their social security number. The provider demonstrated that confidentially was preserved at their location. The patient was informed that they were responsible for ensuring confidentially was secured at their location. The patient's location was documented for emergency purposes. The patient was informed of the necessary steps that would occur if an emergency was to occur or technology failed during session.   An interactive audio and video telecommunication system which permits real time communications between the patient (at the patient's home) and provider (at the home office) was utilized to provide this telehealth service.   Verbal consent was requested and obtained from Alexia Cho on this date, 06/27/25 for a telehealth visit and the patient's location was confirmed at the time of the visit.     HPI: The patient has had a recurrence of depression and anxiety related to increase credit card debt a recent scare about her health which turned out okay. She started to see her therapist and has her next appointment coming up this week. She can't help her son with their grandson economically for now and that has been a stressor as well. She is improving compared to the last appointment and we emphasized continuing in productive endeavors and seeing her therapist.     Past Medical History:  08/10/2015: Abnormal finding on breast imaging  03/11/2013: Abnormal levels of other serum enzymes      Comment:  Abnormal liver enzymes  09/23/2019: Abnormal weight gain      Comment:  Abnormal weight gain  02/24/2025: Abnormal weight loss  02/01/2017: " Acute candidiasis of vulva and vagina      Comment:  Yeast vaginitis  03/17/2016: Acute pain of right knee  05/01/2019: Acute stress reaction      Comment:  Acute reaction to stress  12/07/2017: Acute upper respiratory infection, unspecified      Comment:  Upper respiratory infection with cough and congestion  12/08/2019: Adverse effect of insulin and oral hypoglycemic   (antidiabetic) drugs, initial encounter      Comment:  Metformin adverse reaction  02/14/2019: Allergy status to unspecified drugs, medicaments and   biological substances      Comment:  History of adverse drug reaction  No date: Anxiety  No date: Arthritis  05/09/2023: Arthritis of knee, right  No date: Benign and innocent cardiac murmurs      Comment:  Functional murmur  04/27/2016: Bilateral primary osteoarthritis of knee      Comment:  Primary osteoarthritis of both knees  02/03/2020: Body mass index (BMI) 29.0-29.9, adult      Comment:  BMI 29.0-29.9,adult  03/18/2020: Body mass index (BMI) 29.0-29.9, adult      Comment:  BMI 29.0-29.9,adult  03/17/2016: Decreased range of motion (ROM) of knee  06/13/2017: Dependent relative needing care at home      Comment:  Caregiver stress  10/21/2015: Diaphragmatic hernia without obstruction or gangrene      Comment:  Hiatal hernia  06/19/2014: Effusion, unspecified knee      Comment:  Effusion of knee  02/03/2020: Encounter for general adult medical examination without   abnormal findings      Comment:  Welcome to Medicare preventive visit  10/20/2016: Encounter for immunization      Comment:  Need for prophylactic vaccination and inoculation                against influenza  No date: Encounter for immunization      Comment:  Need for tuberculosis vaccination  01/31/2016: Encounter for other preprocedural examination      Comment:  Pre-op evaluation  05/09/2023: Eyelid retraction right lower eyelid  05/09/2023: Hematuria  No date: Hypertension  06/24/2019: Impacted cerumen, left ear      Comment:   Impacted cerumen of left ear  05/09/2023: Laxity of eyelid  04/21/2015: Long term (current) use of antibiotics      Comment:  Need for prophylactic antibiotic  07/11/2021: Long term (current) use of insulin (Multi)      Comment:  Insulin dose changed  04/11/2021: Major depressive disorder, 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   psychotic features (Multi)      Comment:  Moderately severe recurrent major depression  07/14/2020: Major depressive disorder, 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   psychotic features (Multi)      Comment:  Moderately severe recurrent major depression  02/03/2020: Major depressive disorder, recurrent, moderate      Comment:  Moderate episode of recurrent major depressive disorder  05/09/2023: Metformin adverse reaction  07/06/2021: Obstructive sleep apnea (adult) (pediatric)      Comment:  Obstructive sleep apnea  01/23/2015: Osteoarthritis of right knee  09/18/2017: Other abnormal and inconclusive findings on diagnostic   imaging of breast      Comment:  Mammogram abnormal  06/13/2017: Other conditions influencing health status      Comment:  DM (diabetes mellitus), secondary uncontrolled  01/02/2022: Other long term (current) drug therapy      Comment:  New medication added  03/07/2022: Other nonrheumatic aortic valve disorders      Comment:  Aortic valve sclerosis  03/11/2013: Other specified abnormal immunological findings in serum      Comment:  KARI positive  07/22/2020: Other specified counseling      Comment:  Encounter for diabetes education  06/25/2020: Other specified counseling      Comment:  Glucometer instruction, encounter for  01/30/2015: Other specified disorders of nose and nasal sinuses      Comment:  Staphylococcus infection of nose  07/21/2016: Other specified symptoms and signs involving the   circulatory and respiratory systems      Comment:  Decreased pedal pulses  No date: Overweight      Comment:  Overweight  No date: Pain in unspecified knee      Comment:  Joint pain, knee  02/21/2014:  Pain in unspecified knee      Comment:  Joint pain, knee  No date: Papillomavirus as the cause of diseases classified elsewhere      Comment:  Human papilloma virus infection  No date: Personal history of diseases of the blood and blood-forming   organs and certain disorders involving the immune mechanism      Comment:  History of anemia  No date: Personal history of diseases of the skin and subcutaneous   tissue      Comment:  History of urticaria  No date: Personal history of other diseases of the circulatory system      Comment:  History of hypertension  03/15/2018: Personal history of other diseases of the respiratory   system      Comment:  History of acute sinusitis  No date: Personal history of other drug therapy      Comment:  History of influenza vaccination  09/18/2017: Personal history of other drug therapy      Comment:  History of influenza vaccination  10/02/2018: Personal history of other endocrine, nutritional and   metabolic disease      Comment:  History of hypokalemia  04/10/2019: Personal history of other mental and behavioral disorders      Comment:  History of depression  01/19/2018: Personal history of other specified conditions      Comment:  History of nausea  09/23/2019: Personal history of other specified conditions      Comment:  History of abnormal weight loss  07/22/2015: Personal history of other specified conditions      Comment:  History of diarrhea  03/17/2017: Personal history of other specified conditions      Comment:  History of insomnia  No date: Personal history of pneumonia (recurrent)      Comment:  History of pneumonia  No date: Personal history of urinary (tract) infections      Comment:  History of bladder infections  No date: Personal history of urinary (tract) infections      Comment:  History of urinary tract infection  No date: Postpartum depression      Comment:  Post partum depression  06/05/2019: Problem related to primary support group, unspecified      Comment:   Relationship problem between caregiver and child  05/09/2023: Pulmonary hypertension (Multi)  06/24/2013: Rash and other nonspecific skin eruption      Comment:  Rash  03/17/2016: Right leg weakness  12/21/2020: Type 2 diabetes mellitus with hyperglycemia (Multi)      Comment:  Type 2 diabetes mellitus with hyperglycemia, without                long-term current use of insulin  01/23/2015: Type 2 diabetes mellitus with hyperglycemia, with long-  term current use of insulin  01/27/2016: Unilateral primary osteoarthritis, right knee      Comment:  Arthritis of knee, right  12/08/2019: Unspecified exophthalmos      Comment:  Ocular proptosis  05/09/2023: Urethral caruncle  No date: Urinary tract infection  07/11/2021: Urinary tract infection, site not specified      Comment:  Acute lower urinary tract infection  No date: Varicella  10/05/2022: Vitamin D deficiency, unspecified      Comment:  Vitamin D deficiency     Review of patient's family history indicates:  Problem: Hypertension      Relation: Mother          Name: Lindsay              Age of Onset: (Not Specified)  Problem: Transient ischemic attack      Relation: Mother          Name: Lindsay              Age of Onset: (Not Specified)  Problem: Diverticulitis      Relation: Mother          Name: Lindsay              Age of Onset: (Not Specified)  Problem: Diverticulosis      Relation: Mother          Name: Lindsay              Age of Onset: (Not Specified)  Problem: Other (bladder cancer)      Relation: Mother          Name: Lindsay              Age of Onset: (Not Specified)  Problem: Hearing loss      Relation: Mother          Name: Lindsay              Age of Onset: (Not Specified)  Problem: Hypertension      Relation: Father          Name: Fariha              Age of Onset: (Not Specified)  Problem: Diabetes      Relation: Father          Name: Fariha              Age of Onset: (Not Specified)  Problem: Diabetes      Relation: Son          Name: Kurtis               Age of Onset: (Not Specified)  Problem: Hearing loss      Relation: Son          Name: Kurtis              Age of Onset: (Not Specified)     MSE: The patient is alert, fully oriented, language is intact, and recent and remote memory intact. The patient denies any suicidal or homicidal ideation or plans. The patient presents with anxious and depressive features without manic or psychotic symptoms. Thought is logical and clear. No disturbances of judgment or insight are exhibited. No behavioral disturbances are present on examination.     Mental Status Exam     General: In no acute distress.   Appearance: Calm  Attitude: Cooperative.   Behavior: Anxious and labile features.  Motor Activity: No agitation or retardation. No EPS/TD. Normal gait and station.   Speech: Regular rate, rhythm, volume and tone, spontaneous, fluent.   Mood: Depressive and anxious features.  Affect: Depressive and anxious features.  Thought Process: Rational.  Thought Content: Appropriate  Thought Perception: Does not endorse auditory or visual hallucinations, does not appear to be responding to hallucinatory stimuli.   Cognition: Alert, oriented x3. No deficits noted. Adequate fund of knowledge. No deficit in recent and remote memory. No deficits in attention, concentration or language.    Insight: Insight is limited.   Judgment: Judgment is limited.       Appearance: well-groomed.   Build: Average.   Demeanor: Average.  Eye Contact: Average..   Motor Activity: Average.   Speech: Clear.   Language: Neurologic language is intact.   Fund of Knowledge: aware of current events,~fair fund of knowledge.   Delusions: None Reported.   Self Harm: None Reported.   Aggressive: None Reported.   Mood: Depressive and anxious features.  Affect: Depressive and anxious features.  Orientation: Alert.   Manner: Cooperative.   Thought process: Goal-directed.   Thought association: displays rational thought process.   Content of thought: No suicidal or  homicidal ideation or plans are evidenced.   Abstract/ Rational Thought: intact   Memory: Grossly intact.   Behavior: Calm.   Attention/Concentration: normal.   Cognition: Intact.   Intelligence Estimate: Average.   Executive Function: Intact.   Insight: Intact.   Judgement: Intact.         Review of Systems   Neurological:         Mental Status Exam     General: In no acute distress.   Appearance: Calm  Attitude: Cooperative.   Behavior: Anxious and labile features.  Motor Activity: No agitation or retardation. No EPS/TD. Normal gait and station.   Speech: Regular rate, rhythm, volume and tone, spontaneous, fluent.   Mood: Depressive and anxious features.  Affect: Depressive and anxious features.  Thought Process: Rational.  Thought Content: Appropriate  Thought Perception: Does not endorse auditory or visual hallucinations, does not appear to be responding to hallucinatory stimuli.   Cognition: Alert, oriented x3. No deficits noted. Adequate fund of knowledge. No deficit in recent and remote memory. No deficits in attention, concentration or language.    Insight: Insight is limited.   Judgment: Judgment is limited.       Appearance: well-groomed.   Build: Average.   Demeanor: Average.  Eye Contact: Average..   Motor Activity: Average.   Speech: Clear.   Language: Neurologic language is intact.   Fund of Knowledge: aware of current events,~fair fund of knowledge.   Delusions: None Reported.   Self Harm: None Reported.   Aggressive: None Reported.   Mood: Depressive and anxious features.  Affect: Depressive and anxious features.  Orientation: Alert.   Manner: Cooperative.   Thought process: Goal-directed.   Thought association: displays rational thought process.   Content of thought: No suicidal or homicidal ideation or plans are evidenced.   Abstract/ Rational Thought: intact   Memory: Grossly intact.   Behavior: Calm.   Attention/Concentration: normal.   Cognition: Intact.   Intelligence Estimate: Average.    Executive Function: Intact.   Insight: Intact.   Judgement: Intact.      Psychiatric/Behavioral:  Positive for dysphoric mood. The patient is nervous/anxious.         Mental Status Exam     General: In no acute distress.   Appearance: Calm  Attitude: Cooperative.   Behavior: Anxious and labile features.  Motor Activity: No agitation or retardation. No EPS/TD. Normal gait and station.   Speech: Regular rate, rhythm, volume and tone, spontaneous, fluent.   Mood: Depressive and anxious features.  Affect: Depressive and anxious features.  Thought Process: Rational.  Thought Content: Appropriate  Thought Perception: Does not endorse auditory or visual hallucinations, does not appear to be responding to hallucinatory stimuli.   Cognition: Alert, oriented x3. No deficits noted. Adequate fund of knowledge. No deficit in recent and remote memory. No deficits in attention, concentration or language.    Insight: Insight is limited.   Judgment: Judgment is limited.       Appearance: well-groomed.   Build: Average.   Demeanor: Average.  Eye Contact: Average..   Motor Activity: Average.   Speech: Clear.   Language: Neurologic language is intact.   Fund of Knowledge: aware of current events,~fair fund of knowledge.   Delusions: None Reported.   Self Harm: None Reported.   Aggressive: None Reported.   Mood: Depressive and anxious features.  Affect: Depressive and anxious features.  Orientation: Alert.   Manner: Cooperative.   Thought process: Goal-directed.   Thought association: displays rational thought process.   Content of thought: No suicidal or homicidal ideation or plans are evidenced.   Abstract/ Rational Thought: intact   Memory: Grossly intact.   Behavior: Calm.   Attention/Concentration: normal.   Cognition: Intact.   Intelligence Estimate: Average.   Executive Function: Intact.   Insight: Intact.   Judgement: Intact.      All other systems reviewed and are negative.    Psych Review of Symptoms:    ADHD: Patient denied  any symptoms.         Anxiety:   Generalized Anxiety Symptoms: Difficulty controlling worry and excessive worry.       Developmental and Sensory Concerns: Patient denied any symptoms.         Depressive Symptoms:   Depressed mood and irritable.       Disruptive and Conduct Symptoms: Patient denied any symptoms.         Eating / Feeding Concerns: Patient denied any symptoms.         Elimination Symptoms: Patient denied any symptoms.         Manic Symptoms: Patient denied any symptoms.         Obsessive-Compulsive Symptoms: Patient denied any symptoms.         Psychotic Symptoms: Patient denied any symptoms.           Trauma Related Symptoms: Patient denied any symptoms.           Sleep Concerns: Patient denied any symptoms.             Objective   Physical Exam  Neurological:      Mental Status: She is oriented to person, place, and time.      Comments: Mental Status Exam     General: In no acute distress.   Appearance: Calm  Attitude: Cooperative.   Behavior: Anxious and labile features.  Motor Activity: No agitation or retardation. No EPS/TD. Normal gait and station.   Speech: Regular rate, rhythm, volume and tone, spontaneous, fluent.   Mood: Depressive and anxious features.  Affect: Depressive and anxious features.  Thought Process: Rational.  Thought Content: Appropriate  Thought Perception: Does not endorse auditory or visual hallucinations, does not appear to be responding to hallucinatory stimuli.   Cognition: Alert, oriented x3. No deficits noted. Adequate fund of knowledge. No deficit in recent and remote memory. No deficits in attention, concentration or language.    Insight: Insight is limited.   Judgment: Judgment is limited.       Appearance: well-groomed.   Build: Average.   Demeanor: Average.  Eye Contact: Average..   Motor Activity: Average.   Speech: Clear.   Language: Neurologic language is intact.   Fund of Knowledge: aware of current events,~fair fund of knowledge.   Delusions: None Reported.    Self Harm: None Reported.   Aggressive: None Reported.   Mood: Depressive and anxious features.  Affect: Depressive and anxious features.  Orientation: Alert.   Manner: Cooperative.   Thought process: Goal-directed.   Thought association: displays rational thought process.   Content of thought: No suicidal or homicidal ideation or plans are evidenced.   Abstract/ Rational Thought: intact   Memory: Grossly intact.   Behavior: Calm.   Attention/Concentration: normal.   Cognition: Intact.   Intelligence Estimate: Average.   Executive Function: Intact.   Insight: Intact.   Judgement: Intact.      Psychiatric:      Comments: Mental Status Exam     General: In no acute distress.   Appearance: Calm  Attitude: Cooperative.   Behavior: Anxious and labile features.  Motor Activity: No agitation or retardation. No EPS/TD. Normal gait and station.   Speech: Regular rate, rhythm, volume and tone, spontaneous, fluent.   Mood: Depressive and anxious features.  Affect: Depressive and anxious features.  Thought Process: Rational.  Thought Content: Appropriate  Thought Perception: Does not endorse auditory or visual hallucinations, does not appear to be responding to hallucinatory stimuli.   Cognition: Alert, oriented x3. No deficits noted. Adequate fund of knowledge. No deficit in recent and remote memory. No deficits in attention, concentration or language.    Insight: Insight is limited.   Judgment: Judgment is limited.       Appearance: well-groomed.   Build: Average.   Demeanor: Average.  Eye Contact: Average..   Motor Activity: Average.   Speech: Clear.   Language: Neurologic language is intact.   Fund of Knowledge: aware of current events,~fair fund of knowledge.   Delusions: None Reported.   Self Harm: None Reported.   Aggressive: None Reported.   Mood: Depressive and anxious features.  Affect: Depressive and anxious features.  Orientation: Alert.   Manner: Cooperative.   Thought process: Goal-directed.   Thought  association: displays rational thought process.   Content of thought: No suicidal or homicidal ideation or plans are evidenced.   Abstract/ Rational Thought: intact   Memory: Grossly intact.   Behavior: Calm.   Attention/Concentration: normal.   Cognition: Intact.   Intelligence Estimate: Average.   Executive Function: Intact.   Insight: Intact.   Judgement: Intact.            Lab Review:   Office Visit on 05/21/2025   Component Date Value    POC HEMOGLOBIN A1c 05/21/2025 6.0     WHITE BLOOD CELL COUNT 05/21/2025 8.6     RED BLOOD CELL COUNT 05/21/2025 4.82     HEMOGLOBIN 05/21/2025 15.4     HEMATOCRIT 05/21/2025 45.9 (H)     MCV 05/21/2025 95.2     MCH 05/21/2025 32.0     MCHC 05/21/2025 33.6     RDW 05/21/2025 13.1     PLATELET COUNT 05/21/2025 308     MPV 05/21/2025 11.9     ABSOLUTE NEUTROPHILS 05/21/2025 6,398     ABSOLUTE LYMPHOCYTES 05/21/2025 1,479     ABSOLUTE MONOCYTES 05/21/2025 654     ABSOLUTE EOSINOPHILS 05/21/2025 26     ABSOLUTE BASOPHILS 05/21/2025 43     NEUTROPHILS 05/21/2025 74.4     LYMPHOCYTES 05/21/2025 17.2     MONOCYTES 05/21/2025 7.6     EOSINOPHILS 05/21/2025 0.3     BASOPHILS 05/21/2025 0.5     T3, FREE 05/21/2025 3.6     T4, FREE 05/21/2025 1.5     GLUCOSE 05/21/2025 132 (H)     UREA NITROGEN (BUN) 05/21/2025 20     CREATININE 05/21/2025 0.73     EGFR 05/21/2025 88     SODIUM 05/21/2025 138     POTASSIUM 05/21/2025 4.2     CHLORIDE 05/21/2025 100     CARBON DIOXIDE 05/21/2025 27     ELECTROLYTE BALANCE 05/21/2025 11     CALCIUM 05/21/2025 10.3     PROTEIN, TOTAL 05/21/2025 7.4     ALBUMIN 05/21/2025 4.7     BILIRUBIN, TOTAL 05/21/2025 0.7     ALKALINE PHOSPHATASE 05/21/2025 49     AST 05/21/2025 21     ALT 05/21/2025 24    Office Visit on 02/24/2025   Component Date Value    POC HEMOGLOBIN A1c 02/24/2025 6.9 (A)    Orders Only on 02/13/2025   Component Date Value    CHOLESTEROL, TOTAL 02/13/2025 145     HDL CHOLESTEROL 02/13/2025 38 (L)     TRIGLYCERIDES 02/13/2025 160 (H)      LDL-CHOLESTEROL 02/13/2025 81     CHOL/HDLC RATIO 02/13/2025 3.8     NON HDL CHOLESTEROL 02/13/2025 107     DIRECT LDL 02/13/2025 93     GLUCOSE 02/13/2025 141 (H)     UREA NITROGEN (BUN) 02/13/2025 22     CREATININE 02/13/2025 0.84     EGFR 02/13/2025 75     SODIUM 02/13/2025 137     POTASSIUM 02/13/2025 4.3     CHLORIDE 02/13/2025 99     CARBON DIOXIDE 02/13/2025 27     ELECTROLYTE BALANCE 02/13/2025 11     CALCIUM 02/13/2025 10.1     PROTEIN, TOTAL 02/13/2025 7.1     ALBUMIN 02/13/2025 4.6     BILIRUBIN, TOTAL 02/13/2025 0.6     ALKALINE PHOSPHATASE 02/13/2025 61     AST 02/13/2025 17     ALT 02/13/2025 22     WHITE BLOOD CELL COUNT 02/13/2025 7.8     RED BLOOD CELL COUNT 02/13/2025 5.16 (H)     HEMOGLOBIN 02/13/2025 16.1 (H)     HEMATOCRIT 02/13/2025 48.2 (H)     MCV 02/13/2025 93.4     MCH 02/13/2025 31.2     MCHC 02/13/2025 33.4     RDW 02/13/2025 12.5     PLATELET COUNT 02/13/2025 292     MPV 02/13/2025 11.8     ABSOLUTE NEUTROPHILS 02/13/2025 4,875     ABSOLUTE LYMPHOCYTES 02/13/2025 1,927     ABSOLUTE MONOCYTES 02/13/2025 757     ABSOLUTE EOSINOPHILS 02/13/2025 203     ABSOLUTE BASOPHILS 02/13/2025 39     NEUTROPHILS 02/13/2025 62.5     LYMPHOCYTES 02/13/2025 24.7     MONOCYTES 02/13/2025 9.7     EOSINOPHILS 02/13/2025 2.6     BASOPHILS 02/13/2025 0.5     VITAMIN B12 02/13/2025 990     TSH W/REFLEX TO FT4 02/13/2025 1.40     VITAMIN D,25-OH,TOTAL,IA 02/13/2025 51     CREATININE, RANDOM URINE 02/13/2025 79     ALBUMIN, URINE 02/13/2025 0.2     ALBUMIN/CREATININE RATIO* 02/13/2025 3        Assessment/Plan   Psychiatric Risk Assessment  Violence Risk Assessment: none  Acute Risk of Harm to Others is Considered: low   Suicide Risk Assessment: age > 65 yrs old and   Protective Factors against Suicide: adherence to  treatment, fear of suicide, moral objections to suicide, positive family relationships, and sense of responsibility toward family  Acute Risk of Harm to Self is Considered: low    Imminent  Risk of Suicide or Serious Self-Injury: Low   Chronic Risk of Suicide of Serious Self-Injury: Low  Risk factors: Age, depression history and   Protective factors: Denies current suicidal ideation, denies history of suicide attempts , willingness to seek help and support , gender, access to a variety of clinical interventions , and receiving and engaged in care for mental, physical, and substance use disorders      Imminent Risk of Violence or Homicide: Low   Risk Factors: No significant risk factors identified on screening  Protective Factors: Lack of known history of harm to others , Lack of known history of violent ideation , and lack of known access to firearms.     Assessment & Plan  Obsessive thinking  Diagnosis: Recurrent anxiety and depression, obsessive compulsive features.     Treatment plan:   The FDA risks, benefits & alternatives to the medications prescribed were explained to you today. You were able to understand & repeat these risks, benefits & alternatives to these prescribed medications. You have agreed to proceed with treatment with the medications discussed based on the conclusion that the benefit outweighs the risks of this treatment regimen: continue duloxetine 60 mg twice daily and mirtazapine 30 mg nightly. We are increasing buspirone to 10 mg three times a day from current 7.5 mg three times daily.      Follow up in 3 to 4 weeks.    Orders:    busPIRone (Buspar) 10 mg tablet; Take 1 tablet (10 mg) by mouth 3 times a day.    Anxiety  Diagnosis: Recurrent anxiety and depression, obsessive compulsive features.     Treatment plan:   The FDA risks, benefits & alternatives to the medications prescribed were explained to you today. You were able to understand & repeat these risks, benefits & alternatives to these prescribed medications. You have agreed to proceed with treatment with the medications discussed based on the conclusion that the benefit outweighs the risks of this treatment regimen:  continue duloxetine 60 mg twice daily and mirtazapine 30 mg nightly. We are increasing buspirone to 10 mg three times a day from current 7.5 mg three times daily.      Follow up in 3 to 4 weeks.  Orders:    busPIRone (Buspar) 10 mg tablet; Take 1 tablet (10 mg) by mouth 3 times a day.    Time:   Prep time on date of the patient encounter: 5 minutes.   Time spent directly with patient/family/caregiver: 20 minutes.   Additional time spent on patient care activities:  minutes.   Documentation time: 5 minutes.   Total time on date of patient encounter: 30 minutes.

## 2025-06-27 NOTE — ASSESSMENT & PLAN NOTE
Diagnosis: Recurrent anxiety and depression, obsessive compulsive features.     Treatment plan:   The FDA risks, benefits & alternatives to the medications prescribed were explained to you today. You were able to understand & repeat these risks, benefits & alternatives to these prescribed medications. You have agreed to proceed with treatment with the medications discussed based on the conclusion that the benefit outweighs the risks of this treatment regimen: continue duloxetine 60 mg twice daily and mirtazapine 30 mg nightly. We are increasing buspirone to 10 mg three times a day from current 7.5 mg three times daily.      Follow up in 3 to 4 weeks.  Orders:    busPIRone (Buspar) 10 mg tablet; Take 1 tablet (10 mg) by mouth 3 times a day.

## 2025-06-30 ENCOUNTER — DOCUMENTATION (OUTPATIENT)
Dept: PRIMARY CARE | Facility: CLINIC | Age: 70
End: 2025-06-30
Payer: MEDICARE

## 2025-06-30 DIAGNOSIS — F41.9 ANXIETY: Primary | ICD-10-CM

## 2025-06-30 DIAGNOSIS — F33.2 MODERATELY SEVERE RECURRENT MAJOR DEPRESSION (MULTI): ICD-10-CM

## 2025-06-30 PROCEDURE — 99494 1ST/SBSQ PSYC COLLAB CARE: CPT | Performed by: INTERNAL MEDICINE

## 2025-06-30 PROCEDURE — 99493 SBSQ PSYC COLLAB CARE MGMT: CPT | Performed by: INTERNAL MEDICINE

## 2025-07-08 ENCOUNTER — APPOINTMENT (OUTPATIENT)
Dept: PRIMARY CARE | Facility: CLINIC | Age: 70
End: 2025-07-08
Payer: MEDICARE

## 2025-07-08 DIAGNOSIS — F33.2 MODERATELY SEVERE RECURRENT MAJOR DEPRESSION (MULTI): ICD-10-CM

## 2025-07-08 DIAGNOSIS — F41.9 ANXIETY: Primary | ICD-10-CM

## 2025-07-08 ASSESSMENT — PATIENT HEALTH QUESTIONNAIRE - PHQ9
5. POOR APPETITE OR OVEREATING: MORE THAN HALF THE DAYS
4. FEELING TIRED OR HAVING LITTLE ENERGY: SEVERAL DAYS
7. TROUBLE CONCENTRATING ON THINGS, SUCH AS READING THE NEWSPAPER OR WATCHING TELEVISION: NOT AT ALL
9. THOUGHTS THAT YOU WOULD BE BETTER OFF DEAD, OR OF HURTING YOURSELF: NOT AT ALL
6. FEELING BAD ABOUT YOURSELF - OR THAT YOU ARE A FAILURE OR HAVE LET YOURSELF OR YOUR FAMILY DOWN: NOT AT ALL
1. LITTLE INTEREST OR PLEASURE IN DOING THINGS: SEVERAL DAYS
SUM OF ALL RESPONSES TO PHQ9 QUESTIONS 1 & 2: 3
2. FEELING DOWN, DEPRESSED OR HOPELESS: MORE THAN HALF THE DAYS
3. TROUBLE FALLING OR STAYING ASLEEP: SEVERAL DAYS
SUM OF ALL RESPONSES TO PHQ QUESTIONS 1-9: 7
8. MOVING OR SPEAKING SO SLOWLY THAT OTHER PEOPLE COULD HAVE NOTICED. OR THE OPPOSITE, BEING SO FIGETY OR RESTLESS THAT YOU HAVE BEEN MOVING AROUND A LOT MORE THAN USUAL: NOT AT ALL

## 2025-07-08 ASSESSMENT — ANXIETY QUESTIONNAIRES
2. NOT BEING ABLE TO STOP OR CONTROL WORRYING: MORE THAN HALF THE DAYS
GAD7 TOTAL SCORE: 8
1. FEELING NERVOUS, ANXIOUS, OR ON EDGE: MORE THAN HALF THE DAYS
5. BEING SO RESTLESS THAT IT IS HARD TO SIT STILL: NOT AT ALL
4. TROUBLE RELAXING: MORE THAN HALF THE DAYS
IF YOU CHECKED OFF ANY PROBLEMS ON THIS QUESTIONNAIRE, HOW DIFFICULT HAVE THESE PROBLEMS MADE IT FOR YOU TO DO YOUR WORK, TAKE CARE OF THINGS AT HOME, OR GET ALONG WITH OTHER PEOPLE: SOMEWHAT DIFFICULT
3. WORRYING TOO MUCH ABOUT DIFFERENT THINGS: MORE THAN HALF THE DAYS
7. FEELING AFRAID AS IF SOMETHING AWFUL MIGHT HAPPEN: NOT AT ALL
6. BECOMING EASILY ANNOYED OR IRRITABLE: NOT AT ALL

## 2025-07-08 NOTE — PROGRESS NOTES
[facility]  Progress Note    Type of Interaction: In Office    Start Time: 10:00 am     End Time: 10:40 am     Appointment: Scheduled    Reason for Visit:   Chief Complaint   Patient presents with    Anxiety    Patient reported that she has been struggling with anxiety over her daughter-in-law being upset at her other son's significant other over a money issue from the past. She reported that the significant other will not go to her son's home due to her daughter-in-law being upset with her over her ex not paying her  money that was owed over work to a home that they had jointly owned at the time. M reviewed coping skills for anxiety with patient to decrease symptoms of anxiety.     Interval History / Patient Symptoms:     Patient Health Questionnaire-9 Score: 7 (7/8/2025 10:48 AM)  JORGE A-7 Total Score: 8 (7/8/2025 10:50 AM)    Interventions Provided: Develop Coping Strategies and Review Progress/Goals Stress Management  Bhm reviewed coping skills of anxiety with patient to decrease symptoms of anxiety.      Progress Made: Minimum    Response to Intervention: Patient was engaged in the session and intervention with the M.     Plan:   Patient is scheduled in 2 weeks.   Care Plan    There is no care plan documentation to display.         There are no Patient Instructions on file for this visit.    Continue counseling   Follow Up / Next Appointment:    07/22/25 @ 10:00 am

## 2025-07-14 ENCOUNTER — APPOINTMENT (OUTPATIENT)
Dept: BEHAVIORAL HEALTH | Facility: CLINIC | Age: 70
End: 2025-07-14
Payer: MEDICARE

## 2025-07-14 DIAGNOSIS — F41.9 ANXIETY AND DEPRESSION: ICD-10-CM

## 2025-07-14 DIAGNOSIS — F42.8 OBSESSIVE THINKING: ICD-10-CM

## 2025-07-14 DIAGNOSIS — F41.9 ANXIETY: ICD-10-CM

## 2025-07-14 DIAGNOSIS — F32.A ANXIETY AND DEPRESSION: ICD-10-CM

## 2025-07-14 PROCEDURE — 3044F HG A1C LEVEL LT 7.0%: CPT | Performed by: PSYCHIATRY & NEUROLOGY

## 2025-07-14 PROCEDURE — 99214 OFFICE O/P EST MOD 30 MIN: CPT | Performed by: PSYCHIATRY & NEUROLOGY

## 2025-07-14 PROCEDURE — 4010F ACE/ARB THERAPY RXD/TAKEN: CPT | Performed by: PSYCHIATRY & NEUROLOGY

## 2025-07-14 RX ORDER — BUSPIRONE HYDROCHLORIDE 15 MG/1
15 TABLET ORAL 3 TIMES DAILY
Qty: 90 TABLET | Refills: 2 | Status: SHIPPED | OUTPATIENT
Start: 2025-07-14 | End: 2025-10-12

## 2025-07-14 RX ORDER — DULOXETIN HYDROCHLORIDE 60 MG/1
60 CAPSULE, DELAYED RELEASE ORAL 2 TIMES DAILY
Qty: 180 CAPSULE | Refills: 1 | Status: SHIPPED | OUTPATIENT
Start: 2025-07-14 | End: 2026-01-10

## 2025-07-14 RX ORDER — MIRTAZAPINE 30 MG/1
30 TABLET, FILM COATED ORAL NIGHTLY
Qty: 90 TABLET | Refills: 1 | Status: SHIPPED | OUTPATIENT
Start: 2025-07-14 | End: 2026-01-10

## 2025-07-14 ASSESSMENT — ENCOUNTER SYMPTOMS: DEPRESSED MOOD: 1

## 2025-07-14 NOTE — PROGRESS NOTES
"Subjective   Patient ID: Alexia Cho \"Corina" is a 70 y.o. female who presents for No chief complaint on file. I am a little better.     Virtual Consent: The patient engaged in a telehealth session via Epic audio visual or phone with this provider practicing within the [facility]. The identity of the patient was verified by their date of birth and last four digits of their social security number. The provider demonstrated that confidentially was preserved at their location. The patient was informed that they were responsible for ensuring confidentially was secured at their location. The patient's location was documented for emergency purposes. The patient was informed of the necessary steps that would occur if an emergency was to occur or technology failed during session.   An interactive audio and video telecommunication system which permits real time communications between the patient (at the patient's home) and provider (at the home office) was utilized to provide this telehealth service.   Verbal consent was requested and obtained from Alexia Cho on this date, 07/14/25 for a telehealth visit and the patient's location was confirmed at the time of the visit.     HPI: The patient has had a recurrence of depression and anxiety related to increase credit card debt a recent scare about her health which turned out okay. She started to see her therapist and has her next appointment coming up this week. She can't help her son with their grandson economically for now and that has been a stressor as well. She is improving compared to the last appointment and we emphasized continuing in productive endeavors and seeing her therapist.      Past Medical History:  08/10/2015: Abnormal finding on breast imaging  03/11/2013: Abnormal levels of other serum enzymes      Comment:  Abnormal liver enzymes  09/23/2019: Abnormal weight gain      Comment:  Abnormal weight gain  02/24/2025: Abnormal weight loss  02/01/2017: " Acute candidiasis of vulva and vagina      Comment:  Yeast vaginitis  03/17/2016: Acute pain of right knee  05/01/2019: Acute stress reaction      Comment:  Acute reaction to stress  12/07/2017: Acute upper respiratory infection, unspecified      Comment:  Upper respiratory infection with cough and congestion  12/08/2019: Adverse effect of insulin and oral hypoglycemic   (antidiabetic) drugs, initial encounter      Comment:  Metformin adverse reaction  02/14/2019: Allergy status to unspecified drugs, medicaments and   biological substances      Comment:  History of adverse drug reaction  No date: Anxiety  No date: Arthritis  05/09/2023: Arthritis of knee, right  No date: Benign and innocent cardiac murmurs      Comment:  Functional murmur  04/27/2016: Bilateral primary osteoarthritis of knee      Comment:  Primary osteoarthritis of both knees  02/03/2020: Body mass index (BMI) 29.0-29.9, adult      Comment:  BMI 29.0-29.9,adult  03/18/2020: Body mass index (BMI) 29.0-29.9, adult      Comment:  BMI 29.0-29.9,adult  03/17/2016: Decreased range of motion (ROM) of knee  06/13/2017: Dependent relative needing care at home      Comment:  Caregiver stress  10/21/2015: Diaphragmatic hernia without obstruction or gangrene      Comment:  Hiatal hernia  06/19/2014: Effusion, unspecified knee      Comment:  Effusion of knee  02/03/2020: Encounter for general adult medical examination without   abnormal findings      Comment:  Welcome to Medicare preventive visit  10/20/2016: Encounter for immunization      Comment:  Need for prophylactic vaccination and inoculation                against influenza  No date: Encounter for immunization      Comment:  Need for tuberculosis vaccination  01/31/2016: Encounter for other preprocedural examination      Comment:  Pre-op evaluation  05/09/2023: Eyelid retraction right lower eyelid  05/09/2023: Hematuria  No date: Hypertension  06/24/2019: Impacted cerumen, left ear      Comment:   Impacted cerumen of left ear  05/09/2023: Laxity of eyelid  04/21/2015: Long term (current) use of antibiotics      Comment:  Need for prophylactic antibiotic  07/11/2021: Long term (current) use of insulin (Multi)      Comment:  Insulin dose changed  04/11/2021: Major depressive disorder, 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   psychotic features (Multi)      Comment:  Moderately severe recurrent major depression  07/14/2020: Major depressive disorder, 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   psychotic features (Multi)      Comment:  Moderately severe recurrent major depression  02/03/2020: Major depressive disorder, recurrent, moderate      Comment:  Moderate episode of recurrent major depressive disorder  05/09/2023: Metformin adverse reaction  07/06/2021: Obstructive sleep apnea (adult) (pediatric)      Comment:  Obstructive sleep apnea  01/23/2015: Osteoarthritis of right knee  09/18/2017: Other abnormal and inconclusive findings on diagnostic   imaging of breast      Comment:  Mammogram abnormal  06/13/2017: Other conditions influencing health status      Comment:  DM (diabetes mellitus), secondary uncontrolled  01/02/2022: Other long term (current) drug therapy      Comment:  New medication added  03/07/2022: Other nonrheumatic aortic valve disorders      Comment:  Aortic valve sclerosis  03/11/2013: Other specified abnormal immunological findings in serum      Comment:  KARI positive  07/22/2020: Other specified counseling      Comment:  Encounter for diabetes education  06/25/2020: Other specified counseling      Comment:  Glucometer instruction, encounter for  01/30/2015: Other specified disorders of nose and nasal sinuses      Comment:  Staphylococcus infection of nose  07/21/2016: Other specified symptoms and signs involving the   circulatory and respiratory systems      Comment:  Decreased pedal pulses  No date: Overweight      Comment:  Overweight  No date: Pain in unspecified knee      Comment:  Joint pain, knee  02/21/2014:  Pain in unspecified knee      Comment:  Joint pain, knee  No date: Papillomavirus as the cause of diseases classified elsewhere      Comment:  Human papilloma virus infection  No date: Personal history of diseases of the blood and blood-forming   organs and certain disorders involving the immune mechanism      Comment:  History of anemia  No date: Personal history of diseases of the skin and subcutaneous   tissue      Comment:  History of urticaria  No date: Personal history of other diseases of the circulatory system      Comment:  History of hypertension  03/15/2018: Personal history of other diseases of the respiratory   system      Comment:  History of acute sinusitis  No date: Personal history of other drug therapy      Comment:  History of influenza vaccination  09/18/2017: Personal history of other drug therapy      Comment:  History of influenza vaccination  10/02/2018: Personal history of other endocrine, nutritional and   metabolic disease      Comment:  History of hypokalemia  04/10/2019: Personal history of other mental and behavioral disorders      Comment:  History of depression  01/19/2018: Personal history of other specified conditions      Comment:  History of nausea  09/23/2019: Personal history of other specified conditions      Comment:  History of abnormal weight loss  07/22/2015: Personal history of other specified conditions      Comment:  History of diarrhea  03/17/2017: Personal history of other specified conditions      Comment:  History of insomnia  No date: Personal history of pneumonia (recurrent)      Comment:  History of pneumonia  No date: Personal history of urinary (tract) infections      Comment:  History of bladder infections  No date: Personal history of urinary (tract) infections      Comment:  History of urinary tract infection  No date: Postpartum depression      Comment:  Post partum depression  06/05/2019: Problem related to primary support group, unspecified      Comment:   Relationship problem between caregiver and child  05/09/2023: Pulmonary hypertension (Multi)  06/24/2013: Rash and other nonspecific skin eruption      Comment:  Rash  03/17/2016: Right leg weakness  12/21/2020: Type 2 diabetes mellitus with hyperglycemia (Multi)      Comment:  Type 2 diabetes mellitus with hyperglycemia, without                long-term current use of insulin  01/23/2015: Type 2 diabetes mellitus with hyperglycemia, with long-  term current use of insulin  01/27/2016: Unilateral primary osteoarthritis, right knee      Comment:  Arthritis of knee, right  12/08/2019: Unspecified exophthalmos      Comment:  Ocular proptosis  05/09/2023: Urethral caruncle  No date: Urinary tract infection  07/11/2021: Urinary tract infection, site not specified      Comment:  Acute lower urinary tract infection  No date: Varicella  10/05/2022: Vitamin D deficiency, unspecified      Comment:  Vitamin D deficiency     Review of patient's family history indicates:  Problem: Hypertension      Relation: Mother          Name: Lindsay              Age of Onset: (Not Specified)  Problem: Transient ischemic attack      Relation: Mother          Name: Lindsay              Age of Onset: (Not Specified)  Problem: Diverticulitis      Relation: Mother          Name: Lindsay              Age of Onset: (Not Specified)  Problem: Diverticulosis      Relation: Mother          Name: Lindsay              Age of Onset: (Not Specified)  Problem: Other (bladder cancer)      Relation: Mother          Name: Lindsay              Age of Onset: (Not Specified)  Problem: Hearing loss      Relation: Mother          Name: Lindsay              Age of Onset: (Not Specified)  Problem: Hypertension      Relation: Father          Name: Fariha              Age of Onset: (Not Specified)  Problem: Diabetes      Relation: Father          Name: Fariha              Age of Onset: (Not Specified)  Problem: Diabetes      Relation: Son          Name: Kurtis               Age of Onset: (Not Specified)  Problem: Hearing loss      Relation: Son          Name: Kurtis              Age of Onset: (Not Specified)     MSE: The patient is alert, fully oriented, language is intact, and recent and remote memory intact. The patient denies any suicidal or homicidal ideation or plans. The patient presents with anxious and depressive features without manic or psychotic symptoms. Thought is logical and clear. No disturbances of judgment or insight are exhibited. No behavioral disturbances are present on examination.     Mental Status Exam     General: In no acute distress.   Appearance: Calm  Attitude: Cooperative.   Behavior: Anxious and labile features.  Motor Activity: No agitation or retardation. No EPS/TD. Normal gait and station.   Speech: Regular rate, rhythm, volume and tone, spontaneous, fluent.   Mood: Depressive and anxious features.  Affect: Depressive and anxious features.  Thought Process: Rational.  Thought Content: Appropriate  Thought Perception: Does not endorse auditory or visual hallucinations, does not appear to be responding to hallucinatory stimuli.   Cognition: Alert, oriented x3. No deficits noted. Adequate fund of knowledge. No deficit in recent and remote memory. No deficits in attention, concentration or language.    Insight: Insight is limited.   Judgment: Judgment is limited.       Appearance: well-groomed.   Build: Average.   Demeanor: Average.  Eye Contact: Average..   Motor Activity: Average.   Speech: Clear.   Language: Neurologic language is intact.   Fund of Knowledge: aware of current events,~fair fund of knowledge.   Delusions: None Reported.   Self Harm: None Reported.   Aggressive: None Reported.   Mood: Depressive and anxious features.  Affect: Depressive and anxious features.  Orientation: Alert.   Manner: Cooperative.   Thought process: Goal-directed.   Thought association: displays rational thought process.   Content of thought: No suicidal or  homicidal ideation or plans are evidenced.   Abstract/ Rational Thought: intact   Memory: Grossly intact.   Behavior: Calm.   Attention/Concentration: normal.   Cognition: Intact.   Intelligence Estimate: Average.   Executive Function: Intact.   Insight: Intact.   Judgement: Intact.         Review of Systems   Neurological:         MSE: The patient is alert, fully oriented, language is intact, and recent and remote memory intact. The patient denies any suicidal or homicidal ideation or plans. The patient presents with anxious and depressive features without manic or psychotic symptoms. Thought is logical and clear. No disturbances of judgment or insight are exhibited. No behavioral disturbances are present on examination.     Mental Status Exam     General: In no acute distress.   Appearance: Calm  Attitude: Cooperative.   Behavior: Anxious and labile features.  Motor Activity: No agitation or retardation. No EPS/TD. Normal gait and station.   Speech: Regular rate, rhythm, volume and tone, spontaneous, fluent.   Mood: Depressive and anxious features.  Affect: Depressive and anxious features.  Thought Process: Rational.  Thought Content: Appropriate  Thought Perception: Does not endorse auditory or visual hallucinations, does not appear to be responding to hallucinatory stimuli.   Cognition: Alert, oriented x3. No deficits noted. Adequate fund of knowledge. No deficit in recent and remote memory. No deficits in attention, concentration or language.    Insight: Insight is limited.   Judgment: Judgment is limited.       Appearance: well-groomed.   Build: Average.   Demeanor: Average.  Eye Contact: Average..   Motor Activity: Average.   Speech: Clear.   Language: Neurologic language is intact.   Fund of Knowledge: aware of current events,~fair fund of knowledge.   Delusions: None Reported.   Self Harm: None Reported.   Aggressive: None Reported.   Mood: Depressive and anxious features.  Affect: Depressive and anxious  features.  Orientation: Alert.   Manner: Cooperative.   Thought process: Goal-directed.   Thought association: displays rational thought process.   Content of thought: No suicidal or homicidal ideation or plans are evidenced.   Abstract/ Rational Thought: intact   Memory: Grossly intact.   Behavior: Calm.   Attention/Concentration: normal.   Cognition: Intact.   Intelligence Estimate: Average.   Executive Function: Intact.   Insight: Intact.   Judgement: Intact.      Psychiatric/Behavioral:          MSE: The patient is alert, fully oriented, language is intact, and recent and remote memory intact. The patient denies any suicidal or homicidal ideation or plans. The patient presents with anxious and depressive features without manic or psychotic symptoms. Thought is logical and clear. No disturbances of judgment or insight are exhibited. No behavioral disturbances are present on examination.     Mental Status Exam     General: In no acute distress.   Appearance: Calm  Attitude: Cooperative.   Behavior: Anxious and labile features.  Motor Activity: No agitation or retardation. No EPS/TD. Normal gait and station.   Speech: Regular rate, rhythm, volume and tone, spontaneous, fluent.   Mood: Depressive and anxious features.  Affect: Depressive and anxious features.  Thought Process: Rational.  Thought Content: Appropriate  Thought Perception: Does not endorse auditory or visual hallucinations, does not appear to be responding to hallucinatory stimuli.   Cognition: Alert, oriented x3. No deficits noted. Adequate fund of knowledge. No deficit in recent and remote memory. No deficits in attention, concentration or language.    Insight: Insight is limited.   Judgment: Judgment is limited.       Appearance: well-groomed.   Build: Average.   Demeanor: Average.  Eye Contact: Average..   Motor Activity: Average.   Speech: Clear.   Language: Neurologic language is intact.   Fund of Knowledge: aware of current events,~fair fund of  knowledge.   Delusions: None Reported.   Self Harm: None Reported.   Aggressive: None Reported.   Mood: Depressive and anxious features.  Affect: Depressive and anxious features.  Orientation: Alert.   Manner: Cooperative.   Thought process: Goal-directed.   Thought association: displays rational thought process.   Content of thought: No suicidal or homicidal ideation or plans are evidenced.   Abstract/ Rational Thought: intact   Memory: Grossly intact.   Behavior: Calm.   Attention/Concentration: normal.   Cognition: Intact.   Intelligence Estimate: Average.   Executive Function: Intact.   Insight: Intact.   Judgement: Intact.      All other systems reviewed and are negative.    Psych Review of Symptoms:    ADHD: Patient denied any symptoms.         Anxiety:   Generalized Anxiety Symptoms: Difficulty controlling worry, excessive worry and physiological symptoms of anxiety.       Developmental and Sensory Concerns: Patient denied any symptoms.         Depressive Symptoms:   Depressed mood, irritable, guilt and low self esteem.       Disruptive and Conduct Symptoms: Patient denied any symptoms.         Eating / Feeding Concerns: Patient denied any symptoms.         Elimination Symptoms: Patient denied any symptoms.         Manic Symptoms: Patient denied any symptoms.         Obsessive-Compulsive Symptoms: Patient denied any symptoms.         Psychotic Symptoms: Patient denied any symptoms.           Trauma Related Symptoms: Patient denied any symptoms.           Sleep Concerns: Patient denied any symptoms.             Objective   Physical Exam  Neurological:      Mental Status: She is alert and oriented to person, place, and time.      Comments: MSE: The patient is alert, fully oriented, language is intact, and recent and remote memory intact. The patient denies any suicidal or homicidal ideation or plans. The patient presents with anxious and depressive features without manic or psychotic symptoms. Thought is  logical and clear. No disturbances of judgment or insight are exhibited. No behavioral disturbances are present on examination.     Mental Status Exam     General: In no acute distress.   Appearance: Calm  Attitude: Cooperative.   Behavior: Anxious and labile features.  Motor Activity: No agitation or retardation. No EPS/TD. Normal gait and station.   Speech: Regular rate, rhythm, volume and tone, spontaneous, fluent.   Mood: Depressive and anxious features.  Affect: Depressive and anxious features.  Thought Process: Rational.  Thought Content: Appropriate  Thought Perception: Does not endorse auditory or visual hallucinations, does not appear to be responding to hallucinatory stimuli.   Cognition: Alert, oriented x3. No deficits noted. Adequate fund of knowledge. No deficit in recent and remote memory. No deficits in attention, concentration or language.    Insight: Insight is limited.   Judgment: Judgment is limited.       Appearance: well-groomed.   Build: Average.   Demeanor: Average.  Eye Contact: Average..   Motor Activity: Average.   Speech: Clear.   Language: Neurologic language is intact.   Fund of Knowledge: aware of current events,~fair fund of knowledge.   Delusions: None Reported.   Self Harm: None Reported.   Aggressive: None Reported.   Mood: Depressive and anxious features.  Affect: Depressive and anxious features.  Orientation: Alert.   Manner: Cooperative.   Thought process: Goal-directed.   Thought association: displays rational thought process.   Content of thought: No suicidal or homicidal ideation or plans are evidenced.   Abstract/ Rational Thought: intact   Memory: Grossly intact.   Behavior: Calm.   Attention/Concentration: normal.   Cognition: Intact.   Intelligence Estimate: Average.   Executive Function: Intact.   Insight: Intact.   Judgement: Intact.      Psychiatric:      Comments: MSE: The patient is alert, fully oriented, language is intact, and recent and remote memory intact. The  patient denies any suicidal or homicidal ideation or plans. The patient presents with anxious and depressive features without manic or psychotic symptoms. Thought is logical and clear. No disturbances of judgment or insight are exhibited. No behavioral disturbances are present on examination.     Mental Status Exam     General: In no acute distress.   Appearance: Calm  Attitude: Cooperative.   Behavior: Anxious and labile features.  Motor Activity: No agitation or retardation. No EPS/TD. Normal gait and station.   Speech: Regular rate, rhythm, volume and tone, spontaneous, fluent.   Mood: Depressive and anxious features.  Affect: Depressive and anxious features.  Thought Process: Rational.  Thought Content: Appropriate  Thought Perception: Does not endorse auditory or visual hallucinations, does not appear to be responding to hallucinatory stimuli.   Cognition: Alert, oriented x3. No deficits noted. Adequate fund of knowledge. No deficit in recent and remote memory. No deficits in attention, concentration or language.    Insight: Insight is limited.   Judgment: Judgment is limited.       Appearance: well-groomed.   Build: Average.   Demeanor: Average.  Eye Contact: Average..   Motor Activity: Average.   Speech: Clear.   Language: Neurologic language is intact.   Fund of Knowledge: aware of current events,~fair fund of knowledge.   Delusions: None Reported.   Self Harm: None Reported.   Aggressive: None Reported.   Mood: Depressive and anxious features.  Affect: Depressive and anxious features.  Orientation: Alert.   Manner: Cooperative.   Thought process: Goal-directed.   Thought association: displays rational thought process.   Content of thought: No suicidal or homicidal ideation or plans are evidenced.   Abstract/ Rational Thought: intact   Memory: Grossly intact.   Behavior: Calm.   Attention/Concentration: normal.   Cognition: Intact.   Intelligence Estimate: Average.   Executive Function: Intact.   Insight:  Intact.   Judgement: Intact.            Lab Review:   Office Visit on 05/21/2025   Component Date Value    POC HEMOGLOBIN A1c 05/21/2025 6.0     WHITE BLOOD CELL COUNT 05/21/2025 8.6     RED BLOOD CELL COUNT 05/21/2025 4.82     HEMOGLOBIN 05/21/2025 15.4     HEMATOCRIT 05/21/2025 45.9 (H)     MCV 05/21/2025 95.2     MCH 05/21/2025 32.0     MCHC 05/21/2025 33.6     RDW 05/21/2025 13.1     PLATELET COUNT 05/21/2025 308     MPV 05/21/2025 11.9     ABSOLUTE NEUTROPHILS 05/21/2025 6,398     ABSOLUTE LYMPHOCYTES 05/21/2025 1,479     ABSOLUTE MONOCYTES 05/21/2025 654     ABSOLUTE EOSINOPHILS 05/21/2025 26     ABSOLUTE BASOPHILS 05/21/2025 43     NEUTROPHILS 05/21/2025 74.4     LYMPHOCYTES 05/21/2025 17.2     MONOCYTES 05/21/2025 7.6     EOSINOPHILS 05/21/2025 0.3     BASOPHILS 05/21/2025 0.5     T3, FREE 05/21/2025 3.6     T4, FREE 05/21/2025 1.5     GLUCOSE 05/21/2025 132 (H)     UREA NITROGEN (BUN) 05/21/2025 20     CREATININE 05/21/2025 0.73     EGFR 05/21/2025 88     SODIUM 05/21/2025 138     POTASSIUM 05/21/2025 4.2     CHLORIDE 05/21/2025 100     CARBON DIOXIDE 05/21/2025 27     ELECTROLYTE BALANCE 05/21/2025 11     CALCIUM 05/21/2025 10.3     PROTEIN, TOTAL 05/21/2025 7.4     ALBUMIN 05/21/2025 4.7     BILIRUBIN, TOTAL 05/21/2025 0.7     ALKALINE PHOSPHATASE 05/21/2025 49     AST 05/21/2025 21     ALT 05/21/2025 24    Office Visit on 02/24/2025   Component Date Value    POC HEMOGLOBIN A1c 02/24/2025 6.9 (A)    Orders Only on 02/13/2025   Component Date Value    CHOLESTEROL, TOTAL 02/13/2025 145     HDL CHOLESTEROL 02/13/2025 38 (L)     TRIGLYCERIDES 02/13/2025 160 (H)     LDL-CHOLESTEROL 02/13/2025 81     CHOL/HDLC RATIO 02/13/2025 3.8     NON HDL CHOLESTEROL 02/13/2025 107     DIRECT LDL 02/13/2025 93     GLUCOSE 02/13/2025 141 (H)     UREA NITROGEN (BUN) 02/13/2025 22     CREATININE 02/13/2025 0.84     EGFR 02/13/2025 75     SODIUM 02/13/2025 137     POTASSIUM 02/13/2025 4.3     CHLORIDE 02/13/2025 99     CARBON  DIOXIDE 02/13/2025 27     ELECTROLYTE BALANCE 02/13/2025 11     CALCIUM 02/13/2025 10.1     PROTEIN, TOTAL 02/13/2025 7.1     ALBUMIN 02/13/2025 4.6     BILIRUBIN, TOTAL 02/13/2025 0.6     ALKALINE PHOSPHATASE 02/13/2025 61     AST 02/13/2025 17     ALT 02/13/2025 22     WHITE BLOOD CELL COUNT 02/13/2025 7.8     RED BLOOD CELL COUNT 02/13/2025 5.16 (H)     HEMOGLOBIN 02/13/2025 16.1 (H)     HEMATOCRIT 02/13/2025 48.2 (H)     MCV 02/13/2025 93.4     MCH 02/13/2025 31.2     MCHC 02/13/2025 33.4     RDW 02/13/2025 12.5     PLATELET COUNT 02/13/2025 292     MPV 02/13/2025 11.8     ABSOLUTE NEUTROPHILS 02/13/2025 4,875     ABSOLUTE LYMPHOCYTES 02/13/2025 1,927     ABSOLUTE MONOCYTES 02/13/2025 757     ABSOLUTE EOSINOPHILS 02/13/2025 203     ABSOLUTE BASOPHILS 02/13/2025 39     NEUTROPHILS 02/13/2025 62.5     LYMPHOCYTES 02/13/2025 24.7     MONOCYTES 02/13/2025 9.7     EOSINOPHILS 02/13/2025 2.6     BASOPHILS 02/13/2025 0.5     VITAMIN B12 02/13/2025 990     TSH W/REFLEX TO FT4 02/13/2025 1.40     VITAMIN D,25-OH,TOTAL,IA 02/13/2025 51     CREATININE, RANDOM URINE 02/13/2025 79     ALBUMIN, URINE 02/13/2025 0.2     ALBUMIN/CREATININE RATIO* 02/13/2025 3        Assessment/Plan   Psychiatric Risk Assessment  Violence Risk Assessment: none  Acute Risk of Harm to Others is Considered: low   Suicide Risk Assessment: age > 65 yrs old and   Protective Factors against Suicide: adherence to  treatment, fear of suicide, moral objections to suicide, positive family relationships, and sense of responsibility toward family  Acute Risk of Harm to Self is Considered: low    Imminent Risk of Suicide or Serious Self-Injury: Low   Chronic Risk of Suicide of Serious Self-Injury: Low  Risk factors: Age, depression history and   Protective factors: Denies current suicidal ideation, denies history of suicide attempts , willingness to seek help and support , gender, access to a variety of clinical interventions , and receiving and  engaged in care for mental, physical, and substance use disorders      Imminent Risk of Violence or Homicide: Low   Risk Factors: No significant risk factors identified on screening  Protective Factors: Lack of known history of harm to others , Lack of known history of violent ideation , and lack of known access to firearms.     Assessment & Plan  Obsessive thinking  Diagnosis: Recurrent anxiety and depression, obsessive compulsive features.     Treatment plan:   The FDA risks, benefits & alternatives to the medications prescribed were explained to you today. You were able to understand & repeat these risks, benefits & alternatives to these prescribed medications. You have agreed to proceed with treatment with the medications discussed based on the conclusion that the benefit outweighs the risks of this treatment regimen: continue duloxetine 60 mg twice daily and mirtazapine 30 mg nightly. We are increasing buspirone to 15 mg three times a day from current 10 mg three times daily.      Follow up in 2 to 3 weeks.  Orders:    busPIRone (Buspar) 15 mg tablet; Take 1 tablet (15 mg) by mouth 3 times a day.    Anxiety and depression  Diagnosis: Recurrent anxiety and depression, obsessive compulsive features.     Treatment plan:   The FDA risks, benefits & alternatives to the medications prescribed were explained to you today. You were able to understand & repeat these risks, benefits & alternatives to these prescribed medications. You have agreed to proceed with treatment with the medications discussed based on the conclusion that the benefit outweighs the risks of this treatment regimen: continue duloxetine 60 mg twice daily and mirtazapine 30 mg nightly. We are increasing buspirone to 15 mg three times a day from current 10 mg three times daily.      Follow up in 2 to 3 weeks.  Orders:    mirtazapine (Remeron) 30 mg tablet; Take 1 tablet (30 mg) by mouth once daily at bedtime.    DULoxetine (Cymbalta) 60 mg DR capsule;  Take 1 capsule (60 mg) by mouth 2 times a day.    Time:   Prep time on date of the patient encounter: 5 minutes.   Time spent directly with patient/family/caregiver: 20 minutes.   Additional time spent on patient care activities:  minutes.   Documentation time: 5 minutes.   Total time on date of patient encounter: 30 minutes.

## 2025-07-14 NOTE — ASSESSMENT & PLAN NOTE
Diagnosis: Recurrent anxiety and depression, obsessive compulsive features.     Treatment plan:   The FDA risks, benefits & alternatives to the medications prescribed were explained to you today. You were able to understand & repeat these risks, benefits & alternatives to these prescribed medications. You have agreed to proceed with treatment with the medications discussed based on the conclusion that the benefit outweighs the risks of this treatment regimen: continue duloxetine 60 mg twice daily and mirtazapine 30 mg nightly. We are increasing buspirone to 15 mg three times a day from current 10 mg three times daily.      Follow up in 2 to 3 weeks.  Orders:    busPIRone (Buspar) 15 mg tablet; Take 1 tablet (15 mg) by mouth 3 times a day.

## 2025-07-22 ENCOUNTER — APPOINTMENT (OUTPATIENT)
Dept: PRIMARY CARE | Facility: CLINIC | Age: 70
End: 2025-07-22
Payer: MEDICARE

## 2025-07-22 DIAGNOSIS — F41.9 ANXIETY: Primary | ICD-10-CM

## 2025-07-22 DIAGNOSIS — F33.2 MODERATELY SEVERE RECURRENT MAJOR DEPRESSION (MULTI): ICD-10-CM

## 2025-07-22 ASSESSMENT — ANXIETY QUESTIONNAIRES
IF YOU CHECKED OFF ANY PROBLEMS ON THIS QUESTIONNAIRE, HOW DIFFICULT HAVE THESE PROBLEMS MADE IT FOR YOU TO DO YOUR WORK, TAKE CARE OF THINGS AT HOME, OR GET ALONG WITH OTHER PEOPLE: VERY DIFFICULT
3. WORRYING TOO MUCH ABOUT DIFFERENT THINGS: MORE THAN HALF THE DAYS
6. BECOMING EASILY ANNOYED OR IRRITABLE: NOT AT ALL
2. NOT BEING ABLE TO STOP OR CONTROL WORRYING: MORE THAN HALF THE DAYS
1. FEELING NERVOUS, ANXIOUS, OR ON EDGE: NEARLY EVERY DAY
GAD7 TOTAL SCORE: 10
5. BEING SO RESTLESS THAT IT IS HARD TO SIT STILL: NOT AT ALL
4. TROUBLE RELAXING: NEARLY EVERY DAY
7. FEELING AFRAID AS IF SOMETHING AWFUL MIGHT HAPPEN: NOT AT ALL

## 2025-07-22 ASSESSMENT — PATIENT HEALTH QUESTIONNAIRE - PHQ9
9. THOUGHTS THAT YOU WOULD BE BETTER OFF DEAD, OR OF HURTING YOURSELF: NOT AT ALL
1. LITTLE INTEREST OR PLEASURE IN DOING THINGS: MORE THAN HALF THE DAYS
SUM OF ALL RESPONSES TO PHQ QUESTIONS 1-9: 7
SUM OF ALL RESPONSES TO PHQ9 QUESTIONS 1 & 2: 4
2. FEELING DOWN, DEPRESSED OR HOPELESS: MORE THAN HALF THE DAYS
3. TROUBLE FALLING OR STAYING ASLEEP: NOT AT ALL
5. POOR APPETITE OR OVEREATING: SEVERAL DAYS
7. TROUBLE CONCENTRATING ON THINGS, SUCH AS READING THE NEWSPAPER OR WATCHING TELEVISION: NOT AT ALL
4. FEELING TIRED OR HAVING LITTLE ENERGY: NOT AT ALL
8. MOVING OR SPEAKING SO SLOWLY THAT OTHER PEOPLE COULD HAVE NOTICED. OR THE OPPOSITE, BEING SO FIGETY OR RESTLESS THAT YOU HAVE BEEN MOVING AROUND A LOT MORE THAN USUAL: NOT AT ALL
6. FEELING BAD ABOUT YOURSELF - OR THAT YOU ARE A FAILURE OR HAVE LET YOURSELF OR YOUR FAMILY DOWN: MORE THAN HALF THE DAYS

## 2025-07-22 NOTE — PROGRESS NOTES
Collaborative Care ([facility])  Progress Note    Type of Interaction: In Office    Start Time: 9:57 am     End Time: 10:42 am     Appointment: Scheduled    Reason for Visit:   Chief Complaint   Patient presents with    Anxiety    Patient reported that she wakes up feeling shaky with tension. Patient is stressed over relationship of her daughters-in-law. She stated she is worried that they will not get along at a family reunion this weekend. She reported she is still stressed over financial issues and not being able to help her son get things for her grandson. M reviewed coping skills for anxiety with patient to decrease symptoms of anxiety.     Interval History / Patient Symptoms:     Patient Health Questionnaire-9 Score: 7 (7/22/2025 10:48 AM)  JORGE A-7 Total Score: 10 (7/22/2025 10:48 AM)    Interventions Provided: Develop Coping Strategies and Review Progress/Goals Stress Management  BHM reviewed mindfulness exercises with patient- mindfulness mediation, body scan, mindful eating, and five senses.     Progress Made: Mixed    Response to Intervention: Patient was engaged in the session and intervention with the M.     Plan:   Patient is scheduled in  3 weeks.   Care Plan    There is no care plan documentation to display.         There are no Patient Instructions on file for this visit.    Continue counseling   Follow Up / Next Appointment:    08/12/2025 @ 10:00 am

## 2025-07-23 ENCOUNTER — APPOINTMENT (OUTPATIENT)
Dept: PRIMARY CARE | Facility: CLINIC | Age: 70
End: 2025-07-23
Payer: MEDICARE

## 2025-07-23 VITALS
HEIGHT: 67 IN | WEIGHT: 144.8 LBS | BODY MASS INDEX: 22.73 KG/M2 | HEART RATE: 87 BPM | RESPIRATION RATE: 16 BRPM | OXYGEN SATURATION: 97 %

## 2025-07-23 DIAGNOSIS — E11.65 CONTROLLED TYPE 2 DIABETES MELLITUS WITH HYPERGLYCEMIA, WITH LONG-TERM CURRENT USE OF INSULIN (MULTI): ICD-10-CM

## 2025-07-23 DIAGNOSIS — Z79.4 CONTROLLED TYPE 2 DIABETES MELLITUS WITH HYPERGLYCEMIA, WITH LONG-TERM CURRENT USE OF INSULIN (MULTI): ICD-10-CM

## 2025-07-23 PROCEDURE — 99211 OFF/OP EST MAY X REQ PHY/QHP: CPT | Performed by: INTERNAL MEDICINE

## 2025-07-23 NOTE — PROGRESS NOTES
Mary is here for a nurse visit to have her weight checked related to her Diabetes and medication management.

## 2025-07-25 ENCOUNTER — TELEMEDICINE (OUTPATIENT)
Dept: BEHAVIORAL HEALTH | Facility: CLINIC | Age: 70
End: 2025-07-25
Payer: MEDICARE

## 2025-07-25 DIAGNOSIS — F41.8 MIXED ANXIETY AND DEPRESSIVE DISORDER: ICD-10-CM

## 2025-07-25 PROCEDURE — 99214 OFFICE O/P EST MOD 30 MIN: CPT | Performed by: PSYCHIATRY & NEUROLOGY

## 2025-07-25 PROCEDURE — 3044F HG A1C LEVEL LT 7.0%: CPT | Performed by: PSYCHIATRY & NEUROLOGY

## 2025-07-25 PROCEDURE — 4010F ACE/ARB THERAPY RXD/TAKEN: CPT | Performed by: PSYCHIATRY & NEUROLOGY

## 2025-07-25 ASSESSMENT — ENCOUNTER SYMPTOMS
DYSPHORIC MOOD: 1
NERVOUS/ANXIOUS: 1
DEPRESSED MOOD: 1

## 2025-07-25 NOTE — PROGRESS NOTES
"Subjective   Patient ID: Alexia Cho \"Corina" is a 70 y.o. female who presents for No chief complaint on file. I am still feeling nervous.      Virtual Consent: The patient engaged in a telehealth session via Epic audio visual or phone with this provider practicing within the [facility]. The identity of the patient was verified by their date of birth and last four digits of their social security number. The provider demonstrated that confidentially was preserved at their location. The patient was informed that they were responsible for ensuring confidentially was secured at their location. The patient's location was documented for emergency purposes. The patient was informed of the necessary steps that would occur if an emergency was to occur or technology failed during session.   An interactive audio and video telecommunication system which permits real time communications between the patient (at the patient's home) and provider (at the home office) was utilized to provide this telehealth service.   Verbal consent was requested and obtained from Alexia Cho on this date, 07/25/25 for a telehealth visit and the patient's location was confirmed at the time of the visit.     HPI: The patient has had a recurrence of depression and anxiety related to increase credit card debt a recent scare about her health which turned out okay. She started to see her therapist and has her next appointment coming up this week. She can't help her son with their grandson economically for now and that has been a stressor as well. She is improving compared to the last appointment and we emphasized continuing in productive endeavors and seeing her therapist.      Past Medical History:  08/10/2015: Abnormal finding on breast imaging  03/11/2013: Abnormal levels of other serum enzymes      Comment:  Abnormal liver enzymes  09/23/2019: Abnormal weight gain      Comment:  Abnormal weight gain  02/24/2025: Abnormal weight " loss  02/01/2017: Acute candidiasis of vulva and vagina      Comment:  Yeast vaginitis  03/17/2016: Acute pain of right knee  05/01/2019: Acute stress reaction      Comment:  Acute reaction to stress  12/07/2017: Acute upper respiratory infection, unspecified      Comment:  Upper respiratory infection with cough and congestion  12/08/2019: Adverse effect of insulin and oral hypoglycemic   (antidiabetic) drugs, initial encounter      Comment:  Metformin adverse reaction  02/14/2019: Allergy status to unspecified drugs, medicaments and   biological substances      Comment:  History of adverse drug reaction  No date: Anxiety  No date: Arthritis  05/09/2023: Arthritis of knee, right  No date: Benign and innocent cardiac murmurs      Comment:  Functional murmur  04/27/2016: Bilateral primary osteoarthritis of knee      Comment:  Primary osteoarthritis of both knees  02/03/2020: Body mass index (BMI) 29.0-29.9, adult      Comment:  BMI 29.0-29.9,adult  03/18/2020: Body mass index (BMI) 29.0-29.9, adult      Comment:  BMI 29.0-29.9,adult  03/17/2016: Decreased range of motion (ROM) of knee  06/13/2017: Dependent relative needing care at home      Comment:  Caregiver stress  10/21/2015: Diaphragmatic hernia without obstruction or gangrene      Comment:  Hiatal hernia  06/19/2014: Effusion, unspecified knee      Comment:  Effusion of knee  02/03/2020: Encounter for general adult medical examination without   abnormal findings      Comment:  Welcome to Medicare preventive visit  10/20/2016: Encounter for immunization      Comment:  Need for prophylactic vaccination and inoculation                against influenza  No date: Encounter for immunization      Comment:  Need for tuberculosis vaccination  01/31/2016: Encounter for other preprocedural examination      Comment:  Pre-op evaluation  05/09/2023: Eyelid retraction right lower eyelid  05/09/2023: Hematuria  No date: Hypertension  06/24/2019: Impacted cerumen, left ear       Comment:  Impacted cerumen of left ear  05/09/2023: Laxity of eyelid  04/21/2015: Long term (current) use of antibiotics      Comment:  Need for prophylactic antibiotic  07/11/2021: Long term (current) use of insulin (Multi)      Comment:  Insulin dose changed  04/11/2021: Major depressive disorder, 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   psychotic features (Multi)      Comment:  Moderately severe recurrent major depression  07/14/2020: Major depressive disorder, 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   psychotic features (Multi)      Comment:  Moderately severe recurrent major depression  02/03/2020: Major depressive disorder, recurrent, moderate      Comment:  Moderate episode of recurrent major depressive disorder  05/09/2023: Metformin adverse reaction  07/06/2021: Obstructive sleep apnea (adult) (pediatric)      Comment:  Obstructive sleep apnea  01/23/2015: Osteoarthritis of right knee  09/18/2017: Other abnormal and inconclusive findings on diagnostic   imaging of breast      Comment:  Mammogram abnormal  06/13/2017: Other conditions influencing health status      Comment:  DM (diabetes mellitus), secondary uncontrolled  01/02/2022: Other long term (current) drug therapy      Comment:  New medication added  03/07/2022: Other nonrheumatic aortic valve disorders      Comment:  Aortic valve sclerosis  03/11/2013: Other specified abnormal immunological findings in serum      Comment:  KARI positive  07/22/2020: Other specified counseling      Comment:  Encounter for diabetes education  06/25/2020: Other specified counseling      Comment:  Glucometer instruction, encounter for  01/30/2015: Other specified disorders of nose and nasal sinuses      Comment:  Staphylococcus infection of nose  07/21/2016: Other specified symptoms and signs involving the   circulatory and respiratory systems      Comment:  Decreased pedal pulses  No date: Overweight      Comment:  Overweight  No date: Pain in unspecified knee      Comment:  Joint pain,  knee  02/21/2014: Pain in unspecified knee      Comment:  Joint pain, knee  No date: Papillomavirus as the cause of diseases classified elsewhere      Comment:  Human papilloma virus infection  No date: Personal history of diseases of the blood and blood-forming   organs and certain disorders involving the immune mechanism      Comment:  History of anemia  No date: Personal history of diseases of the skin and subcutaneous   tissue      Comment:  History of urticaria  No date: Personal history of other diseases of the circulatory system      Comment:  History of hypertension  03/15/2018: Personal history of other diseases of the respiratory   system      Comment:  History of acute sinusitis  No date: Personal history of other drug therapy      Comment:  History of influenza vaccination  09/18/2017: Personal history of other drug therapy      Comment:  History of influenza vaccination  10/02/2018: Personal history of other endocrine, nutritional and   metabolic disease      Comment:  History of hypokalemia  04/10/2019: Personal history of other mental and behavioral disorders      Comment:  History of depression  01/19/2018: Personal history of other specified conditions      Comment:  History of nausea  09/23/2019: Personal history of other specified conditions      Comment:  History of abnormal weight loss  07/22/2015: Personal history of other specified conditions      Comment:  History of diarrhea  03/17/2017: Personal history of other specified conditions      Comment:  History of insomnia  No date: Personal history of pneumonia (recurrent)      Comment:  History of pneumonia  No date: Personal history of urinary (tract) infections      Comment:  History of bladder infections  No date: Personal history of urinary (tract) infections      Comment:  History of urinary tract infection  No date: Postpartum depression      Comment:  Post partum depression  06/05/2019: Problem related to primary support group,  unspecified      Comment:  Relationship problem between caregiver and child  05/09/2023: Pulmonary hypertension (Multi)  06/24/2013: Rash and other nonspecific skin eruption      Comment:  Rash  03/17/2016: Right leg weakness  12/21/2020: Type 2 diabetes mellitus with hyperglycemia (Multi)      Comment:  Type 2 diabetes mellitus with hyperglycemia, without                long-term current use of insulin  01/23/2015: Type 2 diabetes mellitus with hyperglycemia, with long-  term current use of insulin  01/27/2016: Unilateral primary osteoarthritis, right knee      Comment:  Arthritis of knee, right  12/08/2019: Unspecified exophthalmos      Comment:  Ocular proptosis  05/09/2023: Urethral caruncle  No date: Urinary tract infection  07/11/2021: Urinary tract infection, site not specified      Comment:  Acute lower urinary tract infection  No date: Varicella  10/05/2022: Vitamin D deficiency, unspecified      Comment:  Vitamin D deficiency     Review of patient's family history indicates:  Problem: Hypertension      Relation: Mother          Name: Lindsay              Age of Onset: (Not Specified)  Problem: Transient ischemic attack      Relation: Mother          Name: Lindsay              Age of Onset: (Not Specified)  Problem: Diverticulitis      Relation: Mother          Name: Lindsay              Age of Onset: (Not Specified)  Problem: Diverticulosis      Relation: Mother          Name: Lindsay              Age of Onset: (Not Specified)  Problem: Other (bladder cancer)      Relation: Mother          Name: Lindsay              Age of Onset: (Not Specified)  Problem: Hearing loss      Relation: Mother          Name: Lindsay              Age of Onset: (Not Specified)  Problem: Hypertension      Relation: Father          Name: Fariha              Age of Onset: (Not Specified)  Problem: Diabetes      Relation: Father          Name: Fariha              Age of Onset: (Not Specified)  Problem: Diabetes      Relation: Son           Name: Kurtis              Age of Onset: (Not Specified)  Problem: Hearing loss      Relation: Son          Name: Kurtis              Age of Onset: (Not Specified)     MSE: The patient is alert, fully oriented, language is intact, and recent and remote memory intact. The patient denies any suicidal or homicidal ideation or plans. The patient presents with anxious and depressive features without manic or psychotic symptoms. Thought is logical and clear. No disturbances of judgment or insight are exhibited. No behavioral disturbances are present on examination.     Mental Status Exam     General: In no acute distress.   Appearance: Calm  Attitude: Cooperative.   Behavior: Anxious and labile features.  Motor Activity: No agitation or retardation. No EPS/TD. Normal gait and station.   Speech: Regular rate, rhythm, volume and tone, spontaneous, fluent.   Mood: Depressive and anxious features.  Affect: Depressive and anxious features.  Thought Process: Rational.  Thought Content: Appropriate  Thought Perception: Does not endorse auditory or visual hallucinations, does not appear to be responding to hallucinatory stimuli.   Cognition: Alert, oriented x3. No deficits noted. Adequate fund of knowledge. No deficit in recent and remote memory. No deficits in attention, concentration or language.    Insight: Insight is limited.   Judgment: Judgment is limited.       Appearance: well-groomed.   Build: Average.   Demeanor: Average.  Eye Contact: Average..   Motor Activity: Average.   Speech: Clear.   Language: Neurologic language is intact.   Fund of Knowledge: aware of current events,~fair fund of knowledge.   Delusions: None Reported.   Self Harm: None Reported.   Aggressive: None Reported.   Mood: Depressive and anxious features.  Affect: Depressive and anxious features.  Orientation: Alert.   Manner: Cooperative.   Thought process: Goal-directed.   Thought association: displays rational thought process.   Content of  thought: No suicidal or homicidal ideation or plans are evidenced.   Abstract/ Rational Thought: intact   Memory: Grossly intact.   Behavior: Calm.   Attention/Concentration: normal.   Cognition: Intact.   Intelligence Estimate: Average.   Executive Function: Intact.   Insight: Intact.   Judgement: Intact.         Review of Systems   Neurological:         Mental Status Exam     General: In no acute distress.   Appearance: Calm  Attitude: Cooperative.   Behavior: Anxious and labile features.  Motor Activity: No agitation or retardation. No EPS/TD. Normal gait and station.   Speech: Regular rate, rhythm, volume and tone, spontaneous, fluent.   Mood: Depressive and anxious features.  Affect: Depressive and anxious features.  Thought Process: Rational.  Thought Content: Appropriate  Thought Perception: Does not endorse auditory or visual hallucinations, does not appear to be responding to hallucinatory stimuli.   Cognition: Alert, oriented x3. No deficits noted. Adequate fund of knowledge. No deficit in recent and remote memory. No deficits in attention, concentration or language.    Insight: Insight is limited.   Judgment: Judgment is limited.       Appearance: well-groomed.   Build: Average.   Demeanor: Average.  Eye Contact: Average..   Motor Activity: Average.   Speech: Clear.   Language: Neurologic language is intact.   Fund of Knowledge: aware of current events,~fair fund of knowledge.   Delusions: None Reported.   Self Harm: None Reported.   Aggressive: None Reported.   Mood: Depressive and anxious features.  Affect: Depressive and anxious features.  Orientation: Alert.   Manner: Cooperative.   Thought process: Goal-directed.   Thought association: displays rational thought process.   Content of thought: No suicidal or homicidal ideation or plans are evidenced.   Abstract/ Rational Thought: intact   Memory: Grossly intact.   Behavior: Calm.   Attention/Concentration: normal.   Cognition: Intact.   Intelligence  Estimate: Average.   Executive Function: Intact.   Insight: Intact.   Judgement: Intact.        Psychiatric/Behavioral:  Positive for dysphoric mood. The patient is nervous/anxious.         Mental Status Exam     General: In no acute distress.   Appearance: Calm  Attitude: Cooperative.   Behavior: Anxious and labile features.  Motor Activity: No agitation or retardation. No EPS/TD. Normal gait and station.   Speech: Regular rate, rhythm, volume and tone, spontaneous, fluent.   Mood: Depressive and anxious features.  Affect: Depressive and anxious features.  Thought Process: Rational.  Thought Content: Appropriate  Thought Perception: Does not endorse auditory or visual hallucinations, does not appear to be responding to hallucinatory stimuli.   Cognition: Alert, oriented x3. No deficits noted. Adequate fund of knowledge. No deficit in recent and remote memory. No deficits in attention, concentration or language.    Insight: Insight is limited.   Judgment: Judgment is limited.       Appearance: well-groomed.   Build: Average.   Demeanor: Average.  Eye Contact: Average..   Motor Activity: Average.   Speech: Clear.   Language: Neurologic language is intact.   Fund of Knowledge: aware of current events,~fair fund of knowledge.   Delusions: None Reported.   Self Harm: None Reported.   Aggressive: None Reported.   Mood: Depressive and anxious features.  Affect: Depressive and anxious features.  Orientation: Alert.   Manner: Cooperative.   Thought process: Goal-directed.   Thought association: displays rational thought process.   Content of thought: No suicidal or homicidal ideation or plans are evidenced.   Abstract/ Rational Thought: intact   Memory: Grossly intact.   Behavior: Calm.   Attention/Concentration: normal.   Cognition: Intact.   Intelligence Estimate: Average.   Executive Function: Intact.   Insight: Intact.   Judgement: Intact.          Psych Review of Symptoms:    ADHD: Patient denied any symptoms.          Anxiety:   Generalized Anxiety Symptoms: Difficulty controlling worry and excessive worry.       Developmental and Sensory Concerns: Patient denied any symptoms.         Depressive Symptoms:   Depressed mood.       Disruptive and Conduct Symptoms: Patient denied any symptoms.         Eating / Feeding Concerns: Patient denied any symptoms.         Elimination Symptoms: Patient denied any symptoms.         Manic Symptoms: Patient denied any symptoms.         Obsessive-Compulsive Symptoms: Patient denied any symptoms.         Psychotic Symptoms: Patient denied any symptoms.           Trauma Related Symptoms: Patient denied any symptoms.           Sleep Concerns: Patient denied any symptoms.             Objective   Physical Exam    Neurological:      Mental Status: She is alert and oriented to person, place, and time.      Comments: Mental Status Exam     General: In no acute distress.   Appearance: Calm  Attitude: Cooperative.   Behavior: Anxious and labile features.  Motor Activity: No agitation or retardation. No EPS/TD. Normal gait and station.   Speech: Regular rate, rhythm, volume and tone, spontaneous, fluent.   Mood: Depressive and anxious features.  Affect: Depressive and anxious features.  Thought Process: Rational.  Thought Content: Appropriate  Thought Perception: Does not endorse auditory or visual hallucinations, does not appear to be responding to hallucinatory stimuli.   Cognition: Alert, oriented x3. No deficits noted. Adequate fund of knowledge. No deficit in recent and remote memory. No deficits in attention, concentration or language.    Insight: Insight is limited.   Judgment: Judgment is limited.       Appearance: well-groomed.   Build: Average.   Demeanor: Average.  Eye Contact: Average..   Motor Activity: Average.   Speech: Clear.   Language: Neurologic language is intact.   Fund of Knowledge: aware of current events,~fair fund of knowledge.   Delusions: None Reported.   Self Harm: None  Reported.   Aggressive: None Reported.   Mood: Depressive and anxious features.  Affect: Depressive and anxious features.  Orientation: Alert.   Manner: Cooperative.   Thought process: Goal-directed.   Thought association: displays rational thought process.   Content of thought: No suicidal or homicidal ideation or plans are evidenced.   Abstract/ Rational Thought: intact   Memory: Grossly intact.   Behavior: Calm.   Attention/Concentration: normal.   Cognition: Intact.   Intelligence Estimate: Average.   Executive Function: Intact.   Insight: Intact.   Judgement: Intact.        Psychiatric:      Comments: Mental Status Exam     General: In no acute distress.   Appearance: Calm  Attitude: Cooperative.   Behavior: Anxious and labile features.  Motor Activity: No agitation or retardation. No EPS/TD. Normal gait and station.   Speech: Regular rate, rhythm, volume and tone, spontaneous, fluent.   Mood: Depressive and anxious features.  Affect: Depressive and anxious features.  Thought Process: Rational.  Thought Content: Appropriate  Thought Perception: Does not endorse auditory or visual hallucinations, does not appear to be responding to hallucinatory stimuli.   Cognition: Alert, oriented x3. No deficits noted. Adequate fund of knowledge. No deficit in recent and remote memory. No deficits in attention, concentration or language.    Insight: Insight is limited.   Judgment: Judgment is limited.       Appearance: well-groomed.   Build: Average.   Demeanor: Average.  Eye Contact: Average..   Motor Activity: Average.   Speech: Clear.   Language: Neurologic language is intact.   Fund of Knowledge: aware of current events,~fair fund of knowledge.   Delusions: None Reported.   Self Harm: None Reported.   Aggressive: None Reported.   Mood: Depressive and anxious features.  Affect: Depressive and anxious features.  Orientation: Alert.   Manner: Cooperative.   Thought process: Goal-directed.   Thought association: displays  rational thought process.   Content of thought: No suicidal or homicidal ideation or plans are evidenced.   Abstract/ Rational Thought: intact   Memory: Grossly intact.   Behavior: Calm.   Attention/Concentration: normal.   Cognition: Intact.   Intelligence Estimate: Average.   Executive Function: Intact.   Insight: Intact.   Judgement: Intact.              Lab Review:   Office Visit on 05/21/2025   Component Date Value    POC HEMOGLOBIN A1c 05/21/2025 6.0     WHITE BLOOD CELL COUNT 05/21/2025 8.6     RED BLOOD CELL COUNT 05/21/2025 4.82     HEMOGLOBIN 05/21/2025 15.4     HEMATOCRIT 05/21/2025 45.9 (H)     MCV 05/21/2025 95.2     MCH 05/21/2025 32.0     MCHC 05/21/2025 33.6     RDW 05/21/2025 13.1     PLATELET COUNT 05/21/2025 308     MPV 05/21/2025 11.9     ABSOLUTE NEUTROPHILS 05/21/2025 6,398     ABSOLUTE LYMPHOCYTES 05/21/2025 1,479     ABSOLUTE MONOCYTES 05/21/2025 654     ABSOLUTE EOSINOPHILS 05/21/2025 26     ABSOLUTE BASOPHILS 05/21/2025 43     NEUTROPHILS 05/21/2025 74.4     LYMPHOCYTES 05/21/2025 17.2     MONOCYTES 05/21/2025 7.6     EOSINOPHILS 05/21/2025 0.3     BASOPHILS 05/21/2025 0.5     T3, FREE 05/21/2025 3.6     T4, FREE 05/21/2025 1.5     GLUCOSE 05/21/2025 132 (H)     UREA NITROGEN (BUN) 05/21/2025 20     CREATININE 05/21/2025 0.73     EGFR 05/21/2025 88     SODIUM 05/21/2025 138     POTASSIUM 05/21/2025 4.2     CHLORIDE 05/21/2025 100     CARBON DIOXIDE 05/21/2025 27     ELECTROLYTE BALANCE 05/21/2025 11     CALCIUM 05/21/2025 10.3     PROTEIN, TOTAL 05/21/2025 7.4     ALBUMIN 05/21/2025 4.7     BILIRUBIN, TOTAL 05/21/2025 0.7     ALKALINE PHOSPHATASE 05/21/2025 49     AST 05/21/2025 21     ALT 05/21/2025 24    Office Visit on 02/24/2025   Component Date Value    POC HEMOGLOBIN A1c 02/24/2025 6.9 (A)    Orders Only on 02/13/2025   Component Date Value    CHOLESTEROL, TOTAL 02/13/2025 145     HDL CHOLESTEROL 02/13/2025 38 (L)     TRIGLYCERIDES 02/13/2025 160 (H)     LDL-CHOLESTEROL 02/13/2025 81      CHOL/HDLC RATIO 02/13/2025 3.8     NON HDL CHOLESTEROL 02/13/2025 107     DIRECT LDL 02/13/2025 93     GLUCOSE 02/13/2025 141 (H)     UREA NITROGEN (BUN) 02/13/2025 22     CREATININE 02/13/2025 0.84     EGFR 02/13/2025 75     SODIUM 02/13/2025 137     POTASSIUM 02/13/2025 4.3     CHLORIDE 02/13/2025 99     CARBON DIOXIDE 02/13/2025 27     ELECTROLYTE BALANCE 02/13/2025 11     CALCIUM 02/13/2025 10.1     PROTEIN, TOTAL 02/13/2025 7.1     ALBUMIN 02/13/2025 4.6     BILIRUBIN, TOTAL 02/13/2025 0.6     ALKALINE PHOSPHATASE 02/13/2025 61     AST 02/13/2025 17     ALT 02/13/2025 22     WHITE BLOOD CELL COUNT 02/13/2025 7.8     RED BLOOD CELL COUNT 02/13/2025 5.16 (H)     HEMOGLOBIN 02/13/2025 16.1 (H)     HEMATOCRIT 02/13/2025 48.2 (H)     MCV 02/13/2025 93.4     MCH 02/13/2025 31.2     MCHC 02/13/2025 33.4     RDW 02/13/2025 12.5     PLATELET COUNT 02/13/2025 292     MPV 02/13/2025 11.8     ABSOLUTE NEUTROPHILS 02/13/2025 4,875     ABSOLUTE LYMPHOCYTES 02/13/2025 1,927     ABSOLUTE MONOCYTES 02/13/2025 757     ABSOLUTE EOSINOPHILS 02/13/2025 203     ABSOLUTE BASOPHILS 02/13/2025 39     NEUTROPHILS 02/13/2025 62.5     LYMPHOCYTES 02/13/2025 24.7     MONOCYTES 02/13/2025 9.7     EOSINOPHILS 02/13/2025 2.6     BASOPHILS 02/13/2025 0.5     VITAMIN B12 02/13/2025 990     TSH W/REFLEX TO FT4 02/13/2025 1.40     VITAMIN D,25-OH,TOTAL,IA 02/13/2025 51     CREATININE, RANDOM URINE 02/13/2025 79     ALBUMIN, URINE 02/13/2025 0.2     ALBUMIN/CREATININE RATIO* 02/13/2025 3        Assessment/Plan   Psychiatric Risk Assessment  Violence Risk Assessment: none  Acute Risk of Harm to Others is Considered: low   Suicide Risk Assessment: age > 65 yrs old and   Protective Factors against Suicide: adherence to  treatment, fear of suicide, moral objections to suicide, positive family relationships, and sense of responsibility toward family  Acute Risk of Harm to Self is Considered: low    Imminent Risk of Suicide or Serious  Self-Injury: Low   Chronic Risk of Suicide of Serious Self-Injury: Low  Risk factors: Age, depression history and   Protective factors: Denies current suicidal ideation, denies history of suicide attempts , willingness to seek help and support , gender, access to a variety of clinical interventions , and receiving and engaged in care for mental, physical, and substance use disorders      Imminent Risk of Violence or Homicide: Low   Risk Factors: No significant risk factors identified on screening  Protective Factors: Lack of known history of harm to others , Lack of known history of violent ideation , and lack of known access to firearms.     Assessment & Plan  Mixed anxiety and depressive disorder  Diagnosis: Recurrent anxiety and depression, obsessive compulsive features.     Treatment plan:   The FDA risks, benefits & alternatives to the medications prescribed were explained to you today. You were able to understand & repeat these risks, benefits & alternatives to these prescribed medications. You have agreed to proceed with treatment with the medications discussed based on the conclusion that the benefit outweighs the risks of this treatment regimen: continue duloxetine 60 mg twice daily and mirtazapine 30 mg nightly. Continue buspirone 15 mg three times a day.    Continue to work with your therapist including with being able to express your feelings.      Follow up in 2 to 3 weeks.     Time:   Prep time on date of the patient encounter: 5 minutes.   Time spent directly with patient/family/caregiver: 20 minutes.   Additional time spent on patient care activities:  minutes.   Documentation time: 5 minutes.   Total time on date of patient encounter: 30 minutes.

## 2025-07-25 NOTE — ASSESSMENT & PLAN NOTE
Diagnosis: Recurrent anxiety and depression, obsessive compulsive features.     Treatment plan:   The FDA risks, benefits & alternatives to the medications prescribed were explained to you today. You were able to understand & repeat these risks, benefits & alternatives to these prescribed medications. You have agreed to proceed with treatment with the medications discussed based on the conclusion that the benefit outweighs the risks of this treatment regimen: continue duloxetine 60 mg twice daily and mirtazapine 30 mg nightly. Continue buspirone 15 mg three times a day.    Continue to work with your therapist including with being able to express your feelings.      Follow up in 2 to 3 weeks.     Time:   Prep time on date of the patient encounter: 5 minutes.   Time spent directly with patient/family/caregiver: 20 minutes.   Additional time spent on patient care activities:  minutes.   Documentation time: 5 minutes.   Total time on date of patient encounter: 30 minutes.

## 2025-07-30 ENCOUNTER — APPOINTMENT (OUTPATIENT)
Dept: PHARMACY | Facility: HOSPITAL | Age: 70
End: 2025-07-30
Payer: MEDICARE

## 2025-07-30 ENCOUNTER — DOCUMENTATION (OUTPATIENT)
Dept: PRIMARY CARE | Facility: CLINIC | Age: 70
End: 2025-07-30

## 2025-07-30 DIAGNOSIS — E11.65 TYPE 2 DIABETES MELLITUS WITH HYPERGLYCEMIA, WITH LONG-TERM CURRENT USE OF INSULIN: Primary | ICD-10-CM

## 2025-07-30 DIAGNOSIS — F41.9 ANXIETY: Primary | ICD-10-CM

## 2025-07-30 DIAGNOSIS — F33.2 MODERATELY SEVERE RECURRENT MAJOR DEPRESSION (MULTI): ICD-10-CM

## 2025-07-30 DIAGNOSIS — Z79.4 TYPE 2 DIABETES MELLITUS WITH HYPERGLYCEMIA, WITH LONG-TERM CURRENT USE OF INSULIN: Primary | ICD-10-CM

## 2025-07-30 RX ORDER — TIRZEPATIDE 5 MG/.5ML
5 INJECTION, SOLUTION SUBCUTANEOUS
Qty: 2 ML | Refills: 5 | Status: SHIPPED | OUTPATIENT
Start: 2025-07-30

## 2025-07-30 NOTE — PROGRESS NOTES
Patient is sent at the request of Nat Wasserman MD for my opinion regarding Type 2 diabetes.  My final recommendations will be communicated back to the requesting provider by way of shared medical record.    Subjective     Prior to follow-up today, patient met with PCP and dose of Mounjaro and Lantus were decreased d/t concerns for low BG.      Diabetes  She has type 2 diabetes mellitus. There are no hypoglycemic complications. Risk factors for coronary artery disease include diabetes mellitus, dyslipidemia and hypertension. An ACE inhibitor/angiotensin II receptor blocker is being taken.     Patient endorses continued issues depression and anxiety. Does not have much of an appetite currently. Endorses one instance of hypolgycemia overnight.     Past Medical History:  She has a past medical history of Abnormal finding on breast imaging (08/10/2015), Abnormal levels of other serum enzymes (03/11/2013), Abnormal weight gain (09/23/2019), Abnormal weight loss (02/24/2025), Acute candidiasis of vulva and vagina (02/01/2017), Acute pain of right knee (03/17/2016), Acute stress reaction (05/01/2019), Acute upper respiratory infection, unspecified (12/07/2017), Adverse effect of insulin and oral hypoglycemic (antidiabetic) drugs, initial encounter (12/08/2019), Allergy status to unspecified drugs, medicaments and biological substances (02/14/2019), Anxiety, Arthritis, Arthritis of knee, right (05/09/2023), Benign and innocent cardiac murmurs, Bilateral primary osteoarthritis of knee (04/27/2016), Body mass index (BMI) 29.0-29.9, adult (02/03/2020), Body mass index (BMI) 29.0-29.9, adult (03/18/2020), Decreased range of motion (ROM) of knee (03/17/2016), Dependent relative needing care at home (06/13/2017), Diaphragmatic hernia without obstruction or gangrene (10/21/2015), Effusion, unspecified knee (06/19/2014), Encounter for general adult medical examination without abnormal findings (02/03/2020), Encounter for  immunization (10/20/2016), Encounter for immunization, Encounter for other preprocedural examination (01/31/2016), Eyelid retraction right lower eyelid (05/09/2023), Hematuria (05/09/2023), Hypertension, Impacted cerumen, left ear (06/24/2019), Laxity of eyelid (05/09/2023), Long term (current) use of antibiotics (04/21/2015), Long term (current) use of insulin (Multi) (07/11/2021), Major depressive disorder, 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 psychotic features (Multi) (04/11/2021), Major depressive disorder, 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 psychotic features (Multi) (07/14/2020), Major depressive disorder, recurrent, moderate (02/03/2020), Metformin adverse reaction (05/09/2023), Obstructive sleep apnea (adult) (pediatric) (07/06/2021), Osteoarthritis of right knee (01/23/2015), Other abnormal and inconclusive findings on diagnostic imaging of breast (09/18/2017), Other conditions influencing health status (06/13/2017), Other long term (current) drug therapy (01/02/2022), 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), Other specified abnormal immunological findings in serum (03/11/2013), Other specified counseling (07/22/2020), Other specified counseling (06/25/2020), Other specified disorders of nose and nasal sinuses (01/30/2015), Other specified symptoms and signs involving the circulatory and respiratory systems (07/21/2016), Overweight, Pain in unspecified knee, Pain in unspecified knee (02/21/2014), Papillomavirus as the cause of diseases classified elsewhere, Personal history of diseases of the blood and blood-forming organs and certain disorders involving the immune mechanism, Personal history of diseases of the skin and subcutaneous tissue, Personal history of other diseases of the circulatory system, Personal history of other diseases of the respiratory system (03/15/2018), Personal history of other drug therapy, Personal history of other drug therapy (09/18/2017), Personal history of other endocrine,  nutritional and metabolic disease (10/02/2018), Personal history of other mental and behavioral disorders (04/10/2019), Personal history of other specified conditions (01/19/2018), Personal history of other specified conditions (09/23/2019), Personal history of other specified conditions (07/22/2015), Personal history of other specified conditions (03/17/2017), Personal history of pneumonia (recurrent), Personal history of urinary (tract) infections, Personal history of urinary (tract) infections, Postpartum depression, Problem related to primary support group, unspecified (06/05/2019), Pulmonary hypertension (Multi) (05/09/2023), Rash and other nonspecific skin eruption (06/24/2013), Right leg weakness (03/17/2016), Type 2 diabetes mellitus with hyperglycemia (Multi) (12/21/2020), Type 2 diabetes mellitus with hyperglycemia, with long-term current use of insulin (01/23/2015), Unilateral primary osteoarthritis, right knee (01/27/2016), Unspecified exophthalmos (12/08/2019), Urethral caruncle (05/09/2023), Urinary tract infection, Urinary tract infection, site not specified (07/11/2021), Varicella, and Vitamin D deficiency, unspecified (10/05/2022).    Past Surgical History:  She has a past surgical history that includes Colonoscopy (05/29/2012); Esophagogastroduodenoscopy (05/29/2012); Other surgical history (Left, 2016); Total knee arthroplasty (07/22/2015); Other surgical history (02/03/2020); Total knee arthroplasty (04/21/2015); Joint replacement (2014); [place] tooth extraction; and Breast surgery.    Social History:  She reports that she has never smoked. She has never been exposed to tobacco smoke. She has never used smokeless tobacco. She reports that she does not currently use alcohol. She reports that she does not use drugs.    Family History:  Family History   Problem Relation Name Age of Onset    Hypertension Mother Lindsay     Transient ischemic attack Mother Lindsay     Diverticulitis Mother Lindsay      Diverticulosis Mother Lindsay     Other (bladder cancer) Mother Lindsay     Hearing loss Mother Lindsay     Hypertension Father Fariha     Diabetes Father Fariha     Diabetes Son Kurtis     Hearing loss Son Kurtis     Cancer Mother Lindsay        Allergies:  Ciprofloxacin, Diclofenac sodium, Erythromycin base, and Naproxen    Current diet: 3 meals per day, 1-2 times per week of fast food (was worse over the summer)  Breakfast: cereal with blueberries  Lunch: turkey sandwich with fruit and yogurt  Dinner: protein, vegetable, and carbohydrate  Snack: 2-3 times per day - was grabbing cookies has now transitioned to wheat thins with cheese or a piece of fruit  Drink: water or diet soda pop (1-2 cans), coffee in the morning    Current exercise: walking twice a week for 15 minutes midday    The patient does have a known family history of diabetes.    Patient is using: continuous glucose monitor  The patient is currently checking the blood glucose continuously.    Hypoglycemia frequency: rare  Hypoglycemia awareness: Yes     CGM Data 7/30/25  Time in ranges   7 day  Above: 14%  In range: 82%  Low: 4%   14 day  Above: 14%  In range: 82%  Low: 4%   Average glucose   7 days average: 118 mg/dL  14 day average: 117 mg/dL  30 day average: 114 mg/dL      Adverse Effects: denies     Objective     Last Recorded Vitals:  BP Readings from Last 6 Encounters:   06/18/25 103/55   05/21/25 104/66   02/24/25 104/54   11/25/24 113/65   08/12/24 100/52   05/15/24 112/52        Wt Readings from Last 6 Encounters:   07/23/25 65.7 kg (144 lb 12.8 oz)   06/18/25 65.8 kg (145 lb)   05/21/25 67.1 kg (148 lb)   03/07/25 73.9 kg (163 lb)   02/24/25 73.9 kg (163 lb)   11/25/24 76 kg (167 lb 9.6 oz)       Diabetes Pharmacotherapy:  Mounjaro 7.5 mg once weekly  Farxiga 10 mg daily  Lantus 18 units in the morning    Previous DM therapy:  Metformin - diarrhea  Trulicity - alternative therapy with Mounjaro    Primary/Secondary Prevention   - Statin?  "Yes  - ACE-I/ARB? Yes  - Aspirin? No    Pertinent PMH Review:  - PMH of Pancreatitis: No  - PMH of Retinopathy: No  - PMH of Urinary Tract Infections: Yes, 3-4 years ago, does not have them often  - PMH of MTC: No    Lab Review  Lab Results   Component Value Date    BILITOT 0.7 05/21/2025    CALCIUM 10.3 05/21/2025    CO2 27 05/21/2025     05/21/2025    CREATININE 0.73 05/21/2025    GLUCOSE 132 (H) 05/21/2025    ALKPHOS 49 05/21/2025    K 4.2 05/21/2025    PROT 7.4 05/21/2025     05/21/2025    AST 21 05/21/2025    ALT 24 05/21/2025    BUN 20 05/21/2025    ANIONGAP 11 05/21/2025    MG 2.28 09/05/2023    ALBUMIN 4.7 05/21/2025    GFRF 82 09/05/2023     Lab Results   Component Value Date    TRIG 160 (H) 02/13/2025    CHOL 145 02/13/2025    LDLCALC 81 02/13/2025    HDL 38 (L) 02/13/2025     Lab Results   Component Value Date    HGBA1C 6.0 05/21/2025    HGBA1C 6.9 (A) 02/24/2025    HGBA1C 8.5 (A) 11/25/2024     No components found for: \"UACR\"  The 10-year ASCVD risk score (Elizabeth CAMPA, et al., 2019) is: 16.7%    Values used to calculate the score:      Age: 70 years      Clincally relevant sex: Female      Is Non- : No      Diabetic: Yes      Tobacco smoker: No      Systolic Blood Pressure: 103 mmHg      Is BP treated: Yes      HDL Cholesterol: 38 mg/dL      Total Cholesterol: 145 mg/dL    Health Maintenance:   Foot Exam: PCP checks at yearly physical, patient denies cuts or wounds on feet  Eye Exam: 9/2023  Urine Albumin: not available   Influenza Immunization: 11/21/2024  Pneumonia Immunization: up to date    Drug Interactions:  No significant drug interactions identified at this time    Assessment/Plan   Problem List Items Addressed This Visit    None  Visit Diagnoses         Type 2 diabetes mellitus with hyperglycemia, with long-term current use of insulin    -  Primary    Relevant Medications    tirzepatide (Mounjaro) 5 mg/0.5 mL pen injector    Other Relevant Orders    Referral to " Clinical Pharmacy        Patients diabetes well controlled with most recent A1c of 6.0% (Goal < 7%). Given reduced appetite with some hypoglycemic events and current BG trends, plan to decrease GLP-1 therapy further.   Continue  Farxiga 10 mg daily  Lantus 16 units injected once daily in the morning  Decrease:  Mounjaro 5 mg once weekly   Scan CGM 4 times daily (morning, twice during the day, and at bedtime)  Follow-up: 8/27/2025 at 9:40 am via phone  PCP Follow-Up: 8/25/2025    Continue all meds under the continuation of care with the referring provider and clinical pharmacy team.

## 2025-08-12 ENCOUNTER — APPOINTMENT (OUTPATIENT)
Dept: PRIMARY CARE | Facility: CLINIC | Age: 70
End: 2025-08-12
Payer: MEDICARE

## 2025-08-12 DIAGNOSIS — F41.9 ANXIETY: Primary | ICD-10-CM

## 2025-08-12 DIAGNOSIS — F33.2 MODERATELY SEVERE RECURRENT MAJOR DEPRESSION (MULTI): ICD-10-CM

## 2025-08-12 ASSESSMENT — PATIENT HEALTH QUESTIONNAIRE - PHQ9
3. TROUBLE FALLING OR STAYING ASLEEP: NOT AT ALL
4. FEELING TIRED OR HAVING LITTLE ENERGY: NOT AT ALL
6. FEELING BAD ABOUT YOURSELF - OR THAT YOU ARE A FAILURE OR HAVE LET YOURSELF OR YOUR FAMILY DOWN: SEVERAL DAYS
SUM OF ALL RESPONSES TO PHQ9 QUESTIONS 1 & 2: 2
2. FEELING DOWN, DEPRESSED OR HOPELESS: SEVERAL DAYS
SUM OF ALL RESPONSES TO PHQ QUESTIONS 1-9: 4
7. TROUBLE CONCENTRATING ON THINGS, SUCH AS READING THE NEWSPAPER OR WATCHING TELEVISION: NOT AT ALL
8. MOVING OR SPEAKING SO SLOWLY THAT OTHER PEOPLE COULD HAVE NOTICED. OR THE OPPOSITE, BEING SO FIGETY OR RESTLESS THAT YOU HAVE BEEN MOVING AROUND A LOT MORE THAN USUAL: NOT AT ALL
5. POOR APPETITE OR OVEREATING: SEVERAL DAYS
9. THOUGHTS THAT YOU WOULD BE BETTER OFF DEAD, OR OF HURTING YOURSELF: NOT AT ALL
1. LITTLE INTEREST OR PLEASURE IN DOING THINGS: SEVERAL DAYS

## 2025-08-12 ASSESSMENT — ANXIETY QUESTIONNAIRES
3. WORRYING TOO MUCH ABOUT DIFFERENT THINGS: SEVERAL DAYS
4. TROUBLE RELAXING: MORE THAN HALF THE DAYS
IF YOU CHECKED OFF ANY PROBLEMS ON THIS QUESTIONNAIRE, HOW DIFFICULT HAVE THESE PROBLEMS MADE IT FOR YOU TO DO YOUR WORK, TAKE CARE OF THINGS AT HOME, OR GET ALONG WITH OTHER PEOPLE: NOT DIFFICULT AT ALL
7. FEELING AFRAID AS IF SOMETHING AWFUL MIGHT HAPPEN: NOT AT ALL
5. BEING SO RESTLESS THAT IT IS HARD TO SIT STILL: NOT AT ALL
2. NOT BEING ABLE TO STOP OR CONTROL WORRYING: SEVERAL DAYS
1. FEELING NERVOUS, ANXIOUS, OR ON EDGE: MORE THAN HALF THE DAYS
6. BECOMING EASILY ANNOYED OR IRRITABLE: NOT AT ALL
GAD7 TOTAL SCORE: 6

## 2025-08-15 ENCOUNTER — APPOINTMENT (OUTPATIENT)
Dept: BEHAVIORAL HEALTH | Facility: CLINIC | Age: 70
End: 2025-08-15
Payer: MEDICARE

## 2025-08-15 DIAGNOSIS — F42.8 OBSESSIVE THINKING: ICD-10-CM

## 2025-08-15 DIAGNOSIS — Z79.899 MEDICATION MANAGEMENT: ICD-10-CM

## 2025-08-15 DIAGNOSIS — F41.8 MIXED ANXIETY AND DEPRESSIVE DISORDER: ICD-10-CM

## 2025-08-15 PROCEDURE — 4010F ACE/ARB THERAPY RXD/TAKEN: CPT | Performed by: PSYCHIATRY & NEUROLOGY

## 2025-08-15 PROCEDURE — 99215 OFFICE O/P EST HI 40 MIN: CPT | Performed by: PSYCHIATRY & NEUROLOGY

## 2025-08-15 PROCEDURE — 3044F HG A1C LEVEL LT 7.0%: CPT | Performed by: PSYCHIATRY & NEUROLOGY

## 2025-08-15 PROCEDURE — 1036F TOBACCO NON-USER: CPT | Performed by: PSYCHIATRY & NEUROLOGY

## 2025-08-15 RX ORDER — ALPRAZOLAM 0.25 MG/1
0.25 TABLET, ORALLY DISINTEGRATING ORAL DAILY PRN
Qty: 30 TABLET | Refills: 0 | Status: SHIPPED | OUTPATIENT
Start: 2025-08-15 | End: 2025-09-14

## 2025-08-15 ASSESSMENT — ENCOUNTER SYMPTOMS
DEPRESSED MOOD: 1
DYSPHORIC MOOD: 1
NERVOUS/ANXIOUS: 1

## 2025-08-19 LAB
ANION GAP SERPL CALCULATED.4IONS-SCNC: 10 MMOL/L (CALC) (ref 7–17)
BUN SERPL-MCNC: 18 MG/DL (ref 7–25)
BUN/CREAT SERPL: ABNORMAL (CALC) (ref 6–22)
CALCIUM SERPL-MCNC: 9.4 MG/DL (ref 8.6–10.4)
CHLORIDE SERPL-SCNC: 106 MMOL/L (ref 98–110)
CO2 SERPL-SCNC: 26 MMOL/L (ref 20–32)
CREAT SERPL-MCNC: 0.71 MG/DL (ref 0.6–1)
EGFRCR SERPLBLD CKD-EPI 2021: 91 ML/MIN/1.73M2
ERYTHROCYTE [DISTWIDTH] IN BLOOD BY AUTOMATED COUNT: 13 % (ref 11–15)
GLUCOSE SERPL-MCNC: 126 MG/DL (ref 65–99)
HCT VFR BLD AUTO: 47.1 % (ref 35–45)
HGB BLD-MCNC: 15.5 G/DL (ref 11.7–15.5)
MCH RBC QN AUTO: 31.9 PG (ref 27–33)
MCHC RBC AUTO-ENTMCNC: 32.9 G/DL (ref 32–36)
MCV RBC AUTO: 96.9 FL (ref 80–100)
PLATELET # BLD AUTO: 268 THOUSAND/UL (ref 140–400)
PMV BLD REES-ECKER: 11.6 FL (ref 7.5–12.5)
POTASSIUM SERPL-SCNC: 4.1 MMOL/L (ref 3.5–5.3)
RBC # BLD AUTO: 4.86 MILLION/UL (ref 3.8–5.1)
SODIUM SERPL-SCNC: 142 MMOL/L (ref 135–146)
WBC # BLD AUTO: 6.5 THOUSAND/UL (ref 3.8–10.8)

## 2025-08-24 DIAGNOSIS — Z51.81 MEDICATION MONITORING ENCOUNTER: ICD-10-CM

## 2025-08-24 DIAGNOSIS — E11.65 TYPE 2 DIABETES MELLITUS WITH HYPERGLYCEMIA, WITH LONG-TERM CURRENT USE OF INSULIN: ICD-10-CM

## 2025-08-24 DIAGNOSIS — Z79.4 TYPE 2 DIABETES MELLITUS WITH HYPERGLYCEMIA, WITH LONG-TERM CURRENT USE OF INSULIN: ICD-10-CM

## 2025-08-24 DIAGNOSIS — I10 HYPERTENSION, ESSENTIAL: ICD-10-CM

## 2025-08-24 ASSESSMENT — ENCOUNTER SYMPTOMS
SWEATS: 0
BLURRED VISION: 0
HEADACHES: 0
VISUAL CHANGE: 0
HUNGER: 0
POLYDIPSIA: 0
POLYPHAGIA: 0
SEIZURES: 0
FATIGUE: 0
BLACKOUTS: 0
WEIGHT LOSS: 0
SPEECH DIFFICULTY: 0
NERVOUS/ANXIOUS: 0
WEAKNESS: 0
CONFUSION: 0
TREMORS: 0
DIZZINESS: 0

## 2025-08-25 ENCOUNTER — APPOINTMENT (OUTPATIENT)
Dept: PRIMARY CARE | Facility: CLINIC | Age: 70
End: 2025-08-25
Payer: MEDICARE

## 2025-08-25 VITALS
SYSTOLIC BLOOD PRESSURE: 107 MMHG | RESPIRATION RATE: 18 BRPM | WEIGHT: 146 LBS | DIASTOLIC BLOOD PRESSURE: 64 MMHG | HEIGHT: 66 IN | HEART RATE: 81 BPM | BODY MASS INDEX: 23.46 KG/M2

## 2025-08-25 DIAGNOSIS — E78.5 HYPERLIPIDEMIA DUE TO TYPE 2 DIABETES MELLITUS (MULTI): ICD-10-CM

## 2025-08-25 DIAGNOSIS — E11.69 HYPERLIPIDEMIA DUE TO TYPE 2 DIABETES MELLITUS (MULTI): ICD-10-CM

## 2025-08-25 DIAGNOSIS — R63.4 WEIGHT LOSS, UNINTENTIONAL: ICD-10-CM

## 2025-08-25 DIAGNOSIS — E11.65 CONTROLLED TYPE 2 DIABETES MELLITUS WITH HYPERGLYCEMIA, WITH LONG-TERM CURRENT USE OF INSULIN (MULTI): ICD-10-CM

## 2025-08-25 DIAGNOSIS — E11.65 TYPE 2 DIABETES MELLITUS WITH HYPERGLYCEMIA, WITH LONG-TERM CURRENT USE OF INSULIN: ICD-10-CM

## 2025-08-25 DIAGNOSIS — Z86.39 HISTORY OF GOITER: ICD-10-CM

## 2025-08-25 DIAGNOSIS — Z79.4 TYPE 2 DIABETES MELLITUS WITH HYPERGLYCEMIA, WITH LONG-TERM CURRENT USE OF INSULIN: ICD-10-CM

## 2025-08-25 DIAGNOSIS — E11.9 TYPE 2 DIABETES MELLITUS TREATED WITH INSULIN (MULTI): ICD-10-CM

## 2025-08-25 DIAGNOSIS — I10 HYPERTENSION, ESSENTIAL: ICD-10-CM

## 2025-08-25 DIAGNOSIS — Z79.4 TYPE 2 DIABETES MELLITUS TREATED WITH INSULIN (MULTI): ICD-10-CM

## 2025-08-25 DIAGNOSIS — I35.8 AORTIC VALVE SCLEROSIS: Primary | ICD-10-CM

## 2025-08-25 DIAGNOSIS — F41.8 MIXED ANXIETY AND DEPRESSIVE DISORDER: ICD-10-CM

## 2025-08-25 DIAGNOSIS — Z79.2 PROPHYLACTIC ANTIBIOTIC: ICD-10-CM

## 2025-08-25 DIAGNOSIS — Z79.4 CONTROLLED TYPE 2 DIABETES MELLITUS WITH HYPERGLYCEMIA, WITH LONG-TERM CURRENT USE OF INSULIN (MULTI): ICD-10-CM

## 2025-08-25 DIAGNOSIS — Z71.85 IMMUNIZATION COUNSELING: ICD-10-CM

## 2025-08-25 LAB — POC HEMOGLOBIN A1C: 6 % (ref 4.2–6.5)

## 2025-08-25 PROCEDURE — 4010F ACE/ARB THERAPY RXD/TAKEN: CPT | Performed by: INTERNAL MEDICINE

## 2025-08-25 PROCEDURE — 1160F RVW MEDS BY RX/DR IN RCRD: CPT | Performed by: INTERNAL MEDICINE

## 2025-08-25 PROCEDURE — 1126F AMNT PAIN NOTED NONE PRSNT: CPT | Performed by: INTERNAL MEDICINE

## 2025-08-25 PROCEDURE — 1159F MED LIST DOCD IN RCRD: CPT | Performed by: INTERNAL MEDICINE

## 2025-08-25 PROCEDURE — 83036 HEMOGLOBIN GLYCOSYLATED A1C: CPT | Performed by: INTERNAL MEDICINE

## 2025-08-25 PROCEDURE — 3078F DIAST BP <80 MM HG: CPT | Performed by: INTERNAL MEDICINE

## 2025-08-25 PROCEDURE — 3008F BODY MASS INDEX DOCD: CPT | Performed by: INTERNAL MEDICINE

## 2025-08-25 PROCEDURE — 3074F SYST BP LT 130 MM HG: CPT | Performed by: INTERNAL MEDICINE

## 2025-08-25 PROCEDURE — G2211 COMPLEX E/M VISIT ADD ON: HCPCS | Performed by: INTERNAL MEDICINE

## 2025-08-25 PROCEDURE — 99214 OFFICE O/P EST MOD 30 MIN: CPT | Performed by: INTERNAL MEDICINE

## 2025-08-25 PROCEDURE — 3044F HG A1C LEVEL LT 7.0%: CPT | Performed by: INTERNAL MEDICINE

## 2025-08-25 RX ORDER — AMLODIPINE BESYLATE 5 MG/1
5 TABLET ORAL DAILY
Qty: 90 TABLET | Refills: 1 | Status: SHIPPED | OUTPATIENT
Start: 2025-08-25 | End: 2026-02-21

## 2025-08-25 RX ORDER — TIRZEPATIDE 5 MG/.5ML
5 INJECTION, SOLUTION SUBCUTANEOUS
Qty: 2 ML | Refills: 5 | Status: SHIPPED | OUTPATIENT
Start: 2025-08-25

## 2025-08-25 RX ORDER — RAMIPRIL 10 MG/1
10 CAPSULE ORAL EVERY 12 HOURS
Qty: 180 CAPSULE | Refills: 3 | Status: SHIPPED | OUTPATIENT
Start: 2025-08-25

## 2025-08-25 RX ORDER — INSULIN GLARGINE 100 [IU]/ML
16 INJECTION, SOLUTION SUBCUTANEOUS NIGHTLY
Qty: 10 ML | Refills: 11 | Status: SHIPPED | OUTPATIENT
Start: 2025-08-25

## 2025-08-25 RX ORDER — FLASH GLUCOSE SENSOR
KIT MISCELLANEOUS
Qty: 2 EACH | Refills: 11 | Status: SHIPPED | OUTPATIENT
Start: 2025-08-25

## 2025-08-25 RX ORDER — SYRINGE AND NEEDLE,INSULIN,1ML 28GX1/2"
1 SYRINGE, EMPTY DISPOSABLE MISCELLANEOUS DAILY
Qty: 100 EACH | Refills: 3 | Status: SHIPPED | OUTPATIENT
Start: 2025-08-25

## 2025-08-25 ASSESSMENT — ENCOUNTER SYMPTOMS
SWEATS: 0
BLURRED VISION: 0
SPEECH DIFFICULTY: 0
HEADACHES: 0
FATIGUE: 0
VISUAL CHANGE: 0
WEAKNESS: 0
SEIZURES: 0
POLYDIPSIA: 0
CONFUSION: 0
BLACKOUTS: 0
POLYPHAGIA: 0
NERVOUS/ANXIOUS: 0
DIZZINESS: 0
TREMORS: 0
WEIGHT LOSS: 0
HUNGER: 0

## 2025-08-25 ASSESSMENT — PAIN SCALES - GENERAL: PAINLEVEL_OUTOF10: 0-NO PAIN

## 2025-08-26 ENCOUNTER — APPOINTMENT (OUTPATIENT)
Dept: PRIMARY CARE | Facility: CLINIC | Age: 70
End: 2025-08-26
Payer: MEDICARE

## 2025-08-26 DIAGNOSIS — F42.8 OBSESSIVE THINKING: ICD-10-CM

## 2025-08-26 DIAGNOSIS — F33.2 MODERATELY SEVERE RECURRENT MAJOR DEPRESSION (MULTI): ICD-10-CM

## 2025-08-26 DIAGNOSIS — F41.9 ANXIETY: Primary | ICD-10-CM

## 2025-08-26 ASSESSMENT — PATIENT HEALTH QUESTIONNAIRE - PHQ9
5. POOR APPETITE OR OVEREATING: NOT AT ALL
3. TROUBLE FALLING OR STAYING ASLEEP: SEVERAL DAYS
8. MOVING OR SPEAKING SO SLOWLY THAT OTHER PEOPLE COULD HAVE NOTICED. OR THE OPPOSITE, BEING SO FIGETY OR RESTLESS THAT YOU HAVE BEEN MOVING AROUND A LOT MORE THAN USUAL: NOT AT ALL
6. FEELING BAD ABOUT YOURSELF - OR THAT YOU ARE A FAILURE OR HAVE LET YOURSELF OR YOUR FAMILY DOWN: SEVERAL DAYS
9. THOUGHTS THAT YOU WOULD BE BETTER OFF DEAD, OR OF HURTING YOURSELF: NOT AT ALL
2. FEELING DOWN, DEPRESSED OR HOPELESS: SEVERAL DAYS
SUM OF ALL RESPONSES TO PHQ9 QUESTIONS 1 & 2: 2
4. FEELING TIRED OR HAVING LITTLE ENERGY: NOT AT ALL
SUM OF ALL RESPONSES TO PHQ QUESTIONS 1-9: 4
7. TROUBLE CONCENTRATING ON THINGS, SUCH AS READING THE NEWSPAPER OR WATCHING TELEVISION: NOT AT ALL
1. LITTLE INTEREST OR PLEASURE IN DOING THINGS: SEVERAL DAYS

## 2025-08-26 ASSESSMENT — ANXIETY QUESTIONNAIRES
2. NOT BEING ABLE TO STOP OR CONTROL WORRYING: SEVERAL DAYS
5. BEING SO RESTLESS THAT IT IS HARD TO SIT STILL: NOT AT ALL
4. TROUBLE RELAXING: SEVERAL DAYS
6. BECOMING EASILY ANNOYED OR IRRITABLE: NOT AT ALL
7. FEELING AFRAID AS IF SOMETHING AWFUL MIGHT HAPPEN: NOT AT ALL
IF YOU CHECKED OFF ANY PROBLEMS ON THIS QUESTIONNAIRE, HOW DIFFICULT HAVE THESE PROBLEMS MADE IT FOR YOU TO DO YOUR WORK, TAKE CARE OF THINGS AT HOME, OR GET ALONG WITH OTHER PEOPLE: NOT DIFFICULT AT ALL
1. FEELING NERVOUS, ANXIOUS, OR ON EDGE: SEVERAL DAYS
3. WORRYING TOO MUCH ABOUT DIFFERENT THINGS: SEVERAL DAYS
GAD7 TOTAL SCORE: 4

## 2025-08-27 ENCOUNTER — APPOINTMENT (OUTPATIENT)
Dept: PHARMACY | Facility: HOSPITAL | Age: 70
End: 2025-08-27
Payer: MEDICARE

## 2025-08-27 DIAGNOSIS — Z79.4 TYPE 2 DIABETES MELLITUS WITH HYPERGLYCEMIA, WITH LONG-TERM CURRENT USE OF INSULIN: ICD-10-CM

## 2025-08-27 DIAGNOSIS — E11.65 TYPE 2 DIABETES MELLITUS WITH HYPERGLYCEMIA, WITH LONG-TERM CURRENT USE OF INSULIN: ICD-10-CM

## 2025-08-28 ENCOUNTER — DOCUMENTATION (OUTPATIENT)
Dept: PRIMARY CARE | Facility: CLINIC | Age: 70
End: 2025-08-28
Payer: MEDICARE

## 2025-08-28 DIAGNOSIS — F41.9 ANXIETY: Primary | ICD-10-CM

## 2025-08-28 DIAGNOSIS — F42.8 OBSESSIVE THINKING: ICD-10-CM

## 2025-08-28 DIAGNOSIS — F33.2 MODERATELY SEVERE RECURRENT MAJOR DEPRESSION (MULTI): ICD-10-CM

## 2025-09-10 ENCOUNTER — APPOINTMENT (OUTPATIENT)
Dept: PRIMARY CARE | Facility: CLINIC | Age: 70
End: 2025-09-10
Payer: MEDICARE

## 2025-09-15 ENCOUNTER — APPOINTMENT (OUTPATIENT)
Dept: BEHAVIORAL HEALTH | Facility: CLINIC | Age: 70
End: 2025-09-15
Payer: MEDICARE

## 2025-09-24 ENCOUNTER — APPOINTMENT (OUTPATIENT)
Dept: PHARMACY | Facility: HOSPITAL | Age: 70
End: 2025-09-24
Payer: MEDICARE

## 2025-10-06 ENCOUNTER — APPOINTMENT (OUTPATIENT)
Dept: BEHAVIORAL HEALTH | Facility: CLINIC | Age: 70
End: 2025-10-06
Payer: MEDICARE

## 2025-11-24 ENCOUNTER — APPOINTMENT (OUTPATIENT)
Dept: PRIMARY CARE | Facility: CLINIC | Age: 70
End: 2025-11-24
Payer: MEDICARE